# Patient Record
Sex: MALE | Race: WHITE | Employment: OTHER
[De-identification: names, ages, dates, MRNs, and addresses within clinical notes are randomized per-mention and may not be internally consistent; named-entity substitution may affect disease eponyms.]

---

## 2017-03-06 ENCOUNTER — OFFICE VISIT (OUTPATIENT)
Dept: CARDIOLOGY | Facility: CLINIC | Age: 82
End: 2017-03-06

## 2017-03-06 VITALS
HEART RATE: 79 BPM | DIASTOLIC BLOOD PRESSURE: 80 MMHG | SYSTOLIC BLOOD PRESSURE: 140 MMHG | OXYGEN SATURATION: 98 % | BODY MASS INDEX: 23.24 KG/M2 | WEIGHT: 162 LBS

## 2017-03-06 DIAGNOSIS — E78.49 OTHER HYPERLIPIDEMIA: Primary | ICD-10-CM

## 2017-03-06 DIAGNOSIS — I10 ESSENTIAL HYPERTENSION: ICD-10-CM

## 2017-03-06 PROCEDURE — G8427 DOCREV CUR MEDS BY ELIG CLIN: HCPCS | Performed by: INTERNAL MEDICINE

## 2017-03-06 PROCEDURE — 1036F TOBACCO NON-USER: CPT | Performed by: INTERNAL MEDICINE

## 2017-03-06 PROCEDURE — 1123F ACP DISCUSS/DSCN MKR DOCD: CPT | Performed by: INTERNAL MEDICINE

## 2017-03-06 PROCEDURE — G8598 ASA/ANTIPLAT THER USED: HCPCS | Performed by: INTERNAL MEDICINE

## 2017-03-06 PROCEDURE — G8484 FLU IMMUNIZE NO ADMIN: HCPCS | Performed by: INTERNAL MEDICINE

## 2017-03-06 PROCEDURE — G8420 CALC BMI NORM PARAMETERS: HCPCS | Performed by: INTERNAL MEDICINE

## 2017-03-06 PROCEDURE — 99214 OFFICE O/P EST MOD 30 MIN: CPT | Performed by: INTERNAL MEDICINE

## 2017-03-06 PROCEDURE — 4040F PNEUMOC VAC/ADMIN/RCVD: CPT | Performed by: INTERNAL MEDICINE

## 2017-03-08 ENCOUNTER — INITIAL CONSULT (OUTPATIENT)
Dept: SURGERY | Facility: CLINIC | Age: 82
End: 2017-03-08

## 2017-03-08 VITALS
HEIGHT: 70 IN | WEIGHT: 160 LBS | SYSTOLIC BLOOD PRESSURE: 140 MMHG | RESPIRATION RATE: 18 BRPM | HEART RATE: 88 BPM | BODY MASS INDEX: 22.9 KG/M2 | DIASTOLIC BLOOD PRESSURE: 90 MMHG

## 2017-03-08 DIAGNOSIS — Z86.010 HISTORY OF COLON POLYPS: ICD-10-CM

## 2017-03-08 DIAGNOSIS — K43.2 INCISIONAL HERNIA, WITHOUT OBSTRUCTION OR GANGRENE: Primary | ICD-10-CM

## 2017-03-08 DIAGNOSIS — R91.1 PULMONARY NODULE: ICD-10-CM

## 2017-03-08 PROCEDURE — G8598 ASA/ANTIPLAT THER USED: HCPCS | Performed by: SURGERY

## 2017-03-08 PROCEDURE — 99204 OFFICE O/P NEW MOD 45 MIN: CPT | Performed by: SURGERY

## 2017-03-08 PROCEDURE — G8419 CALC BMI OUT NRM PARAM NOF/U: HCPCS | Performed by: SURGERY

## 2017-03-08 PROCEDURE — 4040F PNEUMOC VAC/ADMIN/RCVD: CPT | Performed by: SURGERY

## 2017-03-08 PROCEDURE — G8427 DOCREV CUR MEDS BY ELIG CLIN: HCPCS | Performed by: SURGERY

## 2017-03-08 PROCEDURE — G8484 FLU IMMUNIZE NO ADMIN: HCPCS | Performed by: SURGERY

## 2017-03-08 PROCEDURE — 1123F ACP DISCUSS/DSCN MKR DOCD: CPT | Performed by: SURGERY

## 2017-03-08 PROCEDURE — 1036F TOBACCO NON-USER: CPT | Performed by: SURGERY

## 2017-03-08 ASSESSMENT — ENCOUNTER SYMPTOMS
COUGH: 0
NAUSEA: 0
BACK PAIN: 0
TROUBLE SWALLOWING: 0
CHOKING: 0
ABDOMINAL PAIN: 1
SHORTNESS OF BREATH: 0
SORE THROAT: 0
BLOOD IN STOOL: 0
VOMITING: 0

## 2017-03-09 PROBLEM — M54.2 CERVICAL PAIN: Status: ACTIVE | Noted: 2017-01-20

## 2017-03-09 PROBLEM — M19.049 ARTHRITIS OF HAND, DEGENERATIVE: Status: ACTIVE | Noted: 2017-01-20

## 2017-03-17 ENCOUNTER — HOSPITAL ENCOUNTER (OUTPATIENT)
Dept: CT IMAGING | Age: 82
Discharge: HOME | End: 2017-03-17
Payer: MEDICARE

## 2017-03-17 DIAGNOSIS — R91.1 PULMONARY NODULE: ICD-10-CM

## 2017-03-17 PROCEDURE — 71260 CT THORAX DX C+: CPT

## 2017-03-17 PROCEDURE — 6360000004 HC RX CONTRAST MEDICATION: Performed by: SURGERY

## 2017-03-17 RX ADMIN — IOVERSOL 75 ML: 741 INJECTION INTRA-ARTERIAL; INTRAVENOUS at 11:37

## 2017-03-23 ENCOUNTER — HOSPITAL ENCOUNTER (INPATIENT)
Age: 82
LOS: 1 days | Discharge: HOME | DRG: 641 | End: 2017-03-25
Attending: EMERGENCY MEDICINE | Admitting: INTERNAL MEDICINE
Payer: MEDICARE

## 2017-03-23 DIAGNOSIS — E87.6 HYPOKALEMIA: ICD-10-CM

## 2017-03-23 DIAGNOSIS — E86.0 DEHYDRATION: Primary | ICD-10-CM

## 2017-03-23 PROBLEM — K52.9 GASTROENTERITIS, ACUTE: Status: ACTIVE | Noted: 2017-03-23

## 2017-03-23 LAB
-: NORMAL
ABSOLUTE EOS #: 0 K/UL (ref 0–0.4)
ABSOLUTE LYMPH #: 0.8 K/UL (ref 0.9–2.5)
ABSOLUTE MONO #: 0.9 K/UL (ref 0–1)
ALBUMIN SERPL-MCNC: 3 G/DL (ref 3.5–5.2)
ALBUMIN/GLOBULIN RATIO: ABNORMAL (ref 1–2.5)
ALP BLD-CCNC: 52 U/L (ref 40–129)
ALT SERPL-CCNC: 18 U/L (ref 5–41)
AMORPHOUS: NORMAL
ANION GAP SERPL CALCULATED.3IONS-SCNC: 14 MMOL/L (ref 9–17)
AST SERPL-CCNC: 14 U/L
BACTERIA: NORMAL
BASOPHILS # BLD: 0 % (ref 0–2)
BASOPHILS ABSOLUTE: 0 K/UL (ref 0–0.2)
BILIRUB SERPL-MCNC: 0.43 MG/DL (ref 0.3–1.2)
BILIRUBIN URINE: NEGATIVE
BUN BLDV-MCNC: 45 MG/DL (ref 8–23)
BUN/CREAT BLD: 38 (ref 9–20)
CALCIUM SERPL-MCNC: 8.3 MG/DL (ref 8.6–10.4)
CASTS UA: NORMAL /LPF
CHLORIDE BLD-SCNC: 103 MMOL/L (ref 98–107)
CO2: 18 MMOL/L (ref 20–31)
COLOR: YELLOW
COMMENT UA: ABNORMAL
CREAT SERPL-MCNC: 1.17 MG/DL (ref 0.7–1.2)
CRYSTALS, UA: NORMAL /HPF
DIFFERENTIAL TYPE: YES
DIRECT EXAM: NORMAL
EOSINOPHILS RELATIVE PERCENT: 1 % (ref 0–5)
EPITHELIAL CELLS UA: NORMAL /HPF
GFR AFRICAN AMERICAN: >60 ML/MIN
GFR NON-AFRICAN AMERICAN: >60 ML/MIN
GFR SERPL CREATININE-BSD FRML MDRD: ABNORMAL ML/MIN/{1.73_M2}
GFR SERPL CREATININE-BSD FRML MDRD: ABNORMAL ML/MIN/{1.73_M2}
GLUCOSE BLD-MCNC: 108 MG/DL (ref 70–99)
GLUCOSE URINE: NEGATIVE
HCT VFR BLD CALC: 45.6 % (ref 41–53)
HEMOGLOBIN: 15.5 G/DL (ref 13.5–17.5)
KETONES, URINE: ABNORMAL
LEUKOCYTE ESTERASE, URINE: NEGATIVE
LYMPHOCYTES # BLD: 15 % (ref 13–44)
Lab: NORMAL
MAGNESIUM: 2.1 MG/DL (ref 1.6–2.6)
MCH RBC QN AUTO: 28.4 PG (ref 26–34)
MCHC RBC AUTO-ENTMCNC: 34 G/DL (ref 31–37)
MCV RBC AUTO: 83.6 FL (ref 80–100)
MONOCYTES # BLD: 16 % (ref 5–9)
MUCUS: NORMAL
NITRITE, URINE: NEGATIVE
OTHER OBSERVATIONS UA: NORMAL
PDW BLD-RTO: 14.3 % (ref 12.1–15.2)
PH UA: 5 (ref 5–8)
PLATELET # BLD: 284 K/UL (ref 140–450)
PLATELET ESTIMATE: ABNORMAL
PMV BLD AUTO: ABNORMAL FL (ref 6–12)
POTASSIUM SERPL-SCNC: 2.8 MMOL/L (ref 3.7–5.3)
PROTEIN UA: ABNORMAL
RBC # BLD: 5.45 M/UL (ref 4.5–5.9)
RBC # BLD: ABNORMAL 10*6/UL
RBC UA: NORMAL /HPF (ref 0–2)
RENAL EPITHELIAL, UA: NORMAL /HPF
SEG NEUTROPHILS: 68 % (ref 39–75)
SEGMENTED NEUTROPHILS ABSOLUTE COUNT: 3.9 K/UL (ref 2.1–6.5)
SODIUM BLD-SCNC: 135 MMOL/L (ref 135–144)
SPECIFIC GRAVITY UA: 1.02 (ref 1–1.03)
SPECIMEN DESCRIPTION: NORMAL
STATUS: NORMAL
TOTAL PROTEIN: 5.7 G/DL (ref 6.4–8.3)
TRICHOMONAS: NORMAL
TURBIDITY: CLEAR
URINE HGB: NEGATIVE
UROBILINOGEN, URINE: NORMAL
WBC # BLD: 5.8 K/UL (ref 3.5–11)
WBC # BLD: ABNORMAL 10*3/UL
WBC UA: NORMAL /HPF
YEAST: NORMAL

## 2017-03-23 PROCEDURE — 2580000003 HC RX 258: Performed by: EMERGENCY MEDICINE

## 2017-03-23 PROCEDURE — 87804 INFLUENZA ASSAY W/OPTIC: CPT

## 2017-03-23 PROCEDURE — 87328 CRYPTOSPORIDIUM AG IA: CPT

## 2017-03-23 PROCEDURE — 87505 NFCT AGENT DETECTION GI: CPT

## 2017-03-23 PROCEDURE — 2580000003 HC RX 258: Performed by: INTERNAL MEDICINE

## 2017-03-23 PROCEDURE — 96374 THER/PROPH/DIAG INJ IV PUSH: CPT

## 2017-03-23 PROCEDURE — 81001 URINALYSIS AUTO W/SCOPE: CPT

## 2017-03-23 PROCEDURE — 99285 EMERGENCY DEPT VISIT HI MDM: CPT

## 2017-03-23 PROCEDURE — G0378 HOSPITAL OBSERVATION PER HR: HCPCS

## 2017-03-23 PROCEDURE — 87324 CLOSTRIDIUM AG IA: CPT

## 2017-03-23 PROCEDURE — 6370000000 HC RX 637 (ALT 250 FOR IP): Performed by: EMERGENCY MEDICINE

## 2017-03-23 PROCEDURE — 80053 COMPREHEN METABOLIC PANEL: CPT

## 2017-03-23 PROCEDURE — 96361 HYDRATE IV INFUSION ADD-ON: CPT

## 2017-03-23 PROCEDURE — 6360000002 HC RX W HCPCS: Performed by: EMERGENCY MEDICINE

## 2017-03-23 PROCEDURE — 6370000000 HC RX 637 (ALT 250 FOR IP): Performed by: INTERNAL MEDICINE

## 2017-03-23 PROCEDURE — 83735 ASSAY OF MAGNESIUM: CPT

## 2017-03-23 PROCEDURE — 36415 COLL VENOUS BLD VENIPUNCTURE: CPT

## 2017-03-23 PROCEDURE — 93005 ELECTROCARDIOGRAM TRACING: CPT

## 2017-03-23 PROCEDURE — 96375 TX/PRO/DX INJ NEW DRUG ADDON: CPT

## 2017-03-23 PROCEDURE — 87493 C DIFF AMPLIFIED PROBE: CPT

## 2017-03-23 PROCEDURE — 85025 COMPLETE CBC W/AUTO DIFF WBC: CPT

## 2017-03-23 PROCEDURE — 87329 GIARDIA AG IA: CPT

## 2017-03-23 RX ORDER — SODIUM CHLORIDE 0.9 % (FLUSH) 0.9 %
10 SYRINGE (ML) INJECTION EVERY 12 HOURS SCHEDULED
Status: DISCONTINUED | OUTPATIENT
Start: 2017-03-23 | End: 2017-03-25 | Stop reason: HOSPADM

## 2017-03-23 RX ORDER — BENZONATATE 100 MG/1
200 CAPSULE ORAL 3 TIMES DAILY PRN
Status: DISCONTINUED | OUTPATIENT
Start: 2017-03-23 | End: 2017-03-25 | Stop reason: HOSPADM

## 2017-03-23 RX ORDER — MORPHINE SULFATE 2 MG/ML
2 INJECTION, SOLUTION INTRAMUSCULAR; INTRAVENOUS EVERY 4 HOURS PRN
Status: DISCONTINUED | OUTPATIENT
Start: 2017-03-23 | End: 2017-03-25 | Stop reason: HOSPADM

## 2017-03-23 RX ORDER — MORPHINE SULFATE 4 MG/ML
4 INJECTION, SOLUTION INTRAMUSCULAR; INTRAVENOUS ONCE
Status: COMPLETED | OUTPATIENT
Start: 2017-03-23 | End: 2017-03-23

## 2017-03-23 RX ORDER — ONDANSETRON 2 MG/ML
4 INJECTION INTRAMUSCULAR; INTRAVENOUS EVERY 6 HOURS PRN
Status: DISCONTINUED | OUTPATIENT
Start: 2017-03-23 | End: 2017-03-25 | Stop reason: HOSPADM

## 2017-03-23 RX ORDER — ATORVASTATIN CALCIUM 20 MG/1
80 TABLET, FILM COATED ORAL DAILY
Status: DISCONTINUED | OUTPATIENT
Start: 2017-03-24 | End: 2017-03-25 | Stop reason: HOSPADM

## 2017-03-23 RX ORDER — DIPHENHYDRAMINE HYDROCHLORIDE 50 MG/ML
12.5 INJECTION INTRAMUSCULAR; INTRAVENOUS ONCE
Status: COMPLETED | OUTPATIENT
Start: 2017-03-23 | End: 2017-03-23

## 2017-03-23 RX ORDER — CLONAZEPAM 0.5 MG/1
0.25 TABLET ORAL DAILY
Status: DISCONTINUED | OUTPATIENT
Start: 2017-03-24 | End: 2017-03-25 | Stop reason: HOSPADM

## 2017-03-23 RX ORDER — ISOSORBIDE MONONITRATE 30 MG/1
15 TABLET, EXTENDED RELEASE ORAL DAILY
Status: DISCONTINUED | OUTPATIENT
Start: 2017-03-24 | End: 2017-03-25 | Stop reason: HOSPADM

## 2017-03-23 RX ORDER — 0.9 % SODIUM CHLORIDE 0.9 %
1000 INTRAVENOUS SOLUTION INTRAVENOUS ONCE
Status: COMPLETED | OUTPATIENT
Start: 2017-03-23 | End: 2017-03-23

## 2017-03-23 RX ORDER — POTASSIUM CHLORIDE 750 MG/1
40 TABLET, FILM COATED, EXTENDED RELEASE ORAL ONCE
Status: COMPLETED | OUTPATIENT
Start: 2017-03-23 | End: 2017-03-23

## 2017-03-23 RX ORDER — LORAZEPAM 2 MG/ML
1 INJECTION INTRAMUSCULAR ONCE
Status: COMPLETED | OUTPATIENT
Start: 2017-03-23 | End: 2017-03-23

## 2017-03-23 RX ORDER — NITROGLYCERIN 0.4 MG/1
0.4 TABLET SUBLINGUAL EVERY 5 MIN PRN
Status: DISCONTINUED | OUTPATIENT
Start: 2017-03-23 | End: 2017-03-25 | Stop reason: HOSPADM

## 2017-03-23 RX ORDER — ASPIRIN 325 MG
325 TABLET ORAL DAILY
Status: DISCONTINUED | OUTPATIENT
Start: 2017-03-24 | End: 2017-03-25 | Stop reason: HOSPADM

## 2017-03-23 RX ORDER — SODIUM CHLORIDE 9 MG/ML
INJECTION, SOLUTION INTRAVENOUS CONTINUOUS
Status: DISCONTINUED | OUTPATIENT
Start: 2017-03-23 | End: 2017-03-25 | Stop reason: HOSPADM

## 2017-03-23 RX ORDER — CLOPIDOGREL BISULFATE 75 MG/1
75 TABLET ORAL DAILY
Status: DISCONTINUED | OUTPATIENT
Start: 2017-03-24 | End: 2017-03-25 | Stop reason: HOSPADM

## 2017-03-23 RX ORDER — ACETAMINOPHEN 325 MG/1
650 TABLET ORAL EVERY 4 HOURS PRN
Status: DISCONTINUED | OUTPATIENT
Start: 2017-03-23 | End: 2017-03-25 | Stop reason: HOSPADM

## 2017-03-23 RX ORDER — SODIUM CHLORIDE 0.9 % (FLUSH) 0.9 %
10 SYRINGE (ML) INJECTION PRN
Status: DISCONTINUED | OUTPATIENT
Start: 2017-03-23 | End: 2017-03-25 | Stop reason: HOSPADM

## 2017-03-23 RX ORDER — POTASSIUM BICARBONATE 25 MEQ/1
50 TABLET, EFFERVESCENT ORAL ONCE
Status: COMPLETED | OUTPATIENT
Start: 2017-03-23 | End: 2017-03-23

## 2017-03-23 RX ORDER — PANTOPRAZOLE SODIUM 40 MG/10ML
40 INJECTION, POWDER, LYOPHILIZED, FOR SOLUTION INTRAVENOUS DAILY
Status: DISCONTINUED | OUTPATIENT
Start: 2017-03-24 | End: 2017-03-24

## 2017-03-23 RX ORDER — PROPRANOLOL HYDROCHLORIDE 10 MG/1
60 TABLET ORAL 3 TIMES DAILY
Status: DISCONTINUED | OUTPATIENT
Start: 2017-03-23 | End: 2017-03-25 | Stop reason: HOSPADM

## 2017-03-23 RX ORDER — VENLAFAXINE 25 MG/1
100 TABLET ORAL 2 TIMES DAILY
Status: DISCONTINUED | OUTPATIENT
Start: 2017-03-23 | End: 2017-03-25 | Stop reason: HOSPADM

## 2017-03-23 RX ADMIN — LORAZEPAM 1 MG: 2 INJECTION, SOLUTION INTRAMUSCULAR; INTRAVENOUS at 17:17

## 2017-03-23 RX ADMIN — SODIUM CHLORIDE: 9 INJECTION, SOLUTION INTRAVENOUS at 23:02

## 2017-03-23 RX ADMIN — SODIUM CHLORIDE 1000 ML: 9 INJECTION, SOLUTION INTRAVENOUS at 17:11

## 2017-03-23 RX ADMIN — DIPHENHYDRAMINE HYDROCHLORIDE 12.5 MG: 50 INJECTION, SOLUTION INTRAMUSCULAR; INTRAVENOUS at 17:12

## 2017-03-23 RX ADMIN — PROPRANOLOL HYDROCHLORIDE 60 MG: 10 TABLET ORAL at 23:02

## 2017-03-23 RX ADMIN — POTASSIUM CHLORIDE 40 MEQ: 750 TABLET, FILM COATED, EXTENDED RELEASE ORAL at 23:02

## 2017-03-23 RX ADMIN — POTASSIUM BICARBONATE 50 MEQ: 977.5 TABLET, EFFERVESCENT ORAL at 19:04

## 2017-03-23 RX ADMIN — MORPHINE SULFATE 4 MG: 4 INJECTION, SOLUTION INTRAMUSCULAR; INTRAVENOUS at 17:12

## 2017-03-23 ASSESSMENT — PAIN SCALES - GENERAL
PAINLEVEL_OUTOF10: 8
PAINLEVEL_OUTOF10: 3
PAINLEVEL_OUTOF10: 10

## 2017-03-23 ASSESSMENT — PAIN DESCRIPTION - ORIENTATION
ORIENTATION: RIGHT;LEFT
ORIENTATION: MID

## 2017-03-23 ASSESSMENT — PAIN DESCRIPTION - PAIN TYPE
TYPE: ACUTE PAIN
TYPE: ACUTE PAIN

## 2017-03-23 ASSESSMENT — PAIN DESCRIPTION - PROGRESSION: CLINICAL_PROGRESSION: NOT CHANGED

## 2017-03-23 ASSESSMENT — PAIN DESCRIPTION - FREQUENCY: FREQUENCY: INTERMITTENT

## 2017-03-23 ASSESSMENT — PAIN DESCRIPTION - DESCRIPTORS: DESCRIPTORS: CRAMPING

## 2017-03-23 ASSESSMENT — PAIN DESCRIPTION - LOCATION
LOCATION: ABDOMEN
LOCATION: ABDOMEN

## 2017-03-23 ASSESSMENT — PAIN DESCRIPTION - ONSET: ONSET: ON-GOING

## 2017-03-24 LAB
ANION GAP SERPL CALCULATED.3IONS-SCNC: 7 MMOL/L (ref 9–17)
BUN BLDV-MCNC: 33 MG/DL (ref 8–23)
BUN/CREAT BLD: 30 (ref 9–20)
CALCIUM SERPL-MCNC: 8.3 MG/DL (ref 8.6–10.4)
CHLORIDE BLD-SCNC: 107 MMOL/L (ref 98–107)
CO2: 25 MMOL/L (ref 20–31)
CREAT SERPL-MCNC: 1.11 MG/DL (ref 0.7–1.2)
DIRECT EXAM: NEGATIVE
DIRECT EXAM: NORMAL
DIRECT EXAM: NORMAL
EKG ATRIAL RATE: 94 BPM
EKG P AXIS: 53 DEGREES
EKG P-R INTERVAL: 182 MS
EKG Q-T INTERVAL: 394 MS
EKG QRS DURATION: 144 MS
EKG QTC CALCULATION (BAZETT): 492 MS
EKG R AXIS: -45 DEGREES
EKG T AXIS: 17 DEGREES
EKG VENTRICULAR RATE: 94 BPM
GFR AFRICAN AMERICAN: >60 ML/MIN
GFR NON-AFRICAN AMERICAN: >60 ML/MIN
GFR SERPL CREATININE-BSD FRML MDRD: ABNORMAL ML/MIN/{1.73_M2}
GFR SERPL CREATININE-BSD FRML MDRD: ABNORMAL ML/MIN/{1.73_M2}
GLUCOSE BLD-MCNC: 100 MG/DL (ref 70–99)
Lab: NORMAL
MAGNESIUM: 2.3 MG/DL (ref 1.6–2.6)
POTASSIUM SERPL-SCNC: 4.6 MMOL/L (ref 3.7–5.3)
SODIUM BLD-SCNC: 139 MMOL/L (ref 135–144)
SPECIMEN DESCRIPTION: NORMAL
STATUS: NORMAL

## 2017-03-24 PROCEDURE — 36415 COLL VENOUS BLD VENIPUNCTURE: CPT

## 2017-03-24 PROCEDURE — 83735 ASSAY OF MAGNESIUM: CPT

## 2017-03-24 PROCEDURE — 6360000002 HC RX W HCPCS: Performed by: INTERNAL MEDICINE

## 2017-03-24 PROCEDURE — G0378 HOSPITAL OBSERVATION PER HR: HCPCS

## 2017-03-24 PROCEDURE — 97161 PT EVAL LOW COMPLEX 20 MIN: CPT

## 2017-03-24 PROCEDURE — 6370000000 HC RX 637 (ALT 250 FOR IP): Performed by: INTERNAL MEDICINE

## 2017-03-24 PROCEDURE — C9113 INJ PANTOPRAZOLE SODIUM, VIA: HCPCS | Performed by: INTERNAL MEDICINE

## 2017-03-24 PROCEDURE — 2580000003 HC RX 258: Performed by: INTERNAL MEDICINE

## 2017-03-24 PROCEDURE — 80048 BASIC METABOLIC PNL TOTAL CA: CPT

## 2017-03-24 PROCEDURE — 1200000000 HC SEMI PRIVATE

## 2017-03-24 RX ORDER — LOPERAMIDE HYDROCHLORIDE 2 MG/1
2 CAPSULE ORAL 4 TIMES DAILY PRN
Status: DISCONTINUED | OUTPATIENT
Start: 2017-03-24 | End: 2017-03-25 | Stop reason: HOSPADM

## 2017-03-24 RX ADMIN — ASPIRIN 325 MG ORAL TABLET 325 MG: 325 PILL ORAL at 08:41

## 2017-03-24 RX ADMIN — ISOSORBIDE MONONITRATE 15 MG: 30 TABLET, EXTENDED RELEASE ORAL at 08:43

## 2017-03-24 RX ADMIN — ENOXAPARIN SODIUM 40 MG: 40 INJECTION SUBCUTANEOUS at 08:42

## 2017-03-24 RX ADMIN — CLOPIDOGREL BISULFATE 75 MG: 75 TABLET ORAL at 08:42

## 2017-03-24 RX ADMIN — GABAPENTIN 400 MG: 300 CAPSULE ORAL at 08:42

## 2017-03-24 RX ADMIN — SODIUM CHLORIDE: 9 INJECTION, SOLUTION INTRAVENOUS at 18:01

## 2017-03-24 RX ADMIN — LOPERAMIDE HYDROCHLORIDE 2 MG: 2 CAPSULE ORAL at 17:03

## 2017-03-24 RX ADMIN — PROPRANOLOL HYDROCHLORIDE 60 MG: 10 TABLET ORAL at 08:41

## 2017-03-24 RX ADMIN — SODIUM CHLORIDE: 9 INJECTION, SOLUTION INTRAVENOUS at 08:01

## 2017-03-24 RX ADMIN — CLONAZEPAM 0.25 MG: 0.5 TABLET ORAL at 08:41

## 2017-03-24 RX ADMIN — VENLAFAXINE 100 MG: 25 TABLET ORAL at 08:40

## 2017-03-24 RX ADMIN — Medication 10 ML: at 08:42

## 2017-03-24 RX ADMIN — PANTOPRAZOLE SODIUM 40 MG: 40 INJECTION, POWDER, FOR SOLUTION INTRAVENOUS at 08:42

## 2017-03-24 RX ADMIN — VENLAFAXINE 100 MG: 25 TABLET ORAL at 20:33

## 2017-03-24 ASSESSMENT — PAIN SCALES - GENERAL
PAINLEVEL_OUTOF10: 4
PAINLEVEL_OUTOF10: 0
PAINLEVEL_OUTOF10: 2
PAINLEVEL_OUTOF10: 3
PAINLEVEL_OUTOF10: 0
PAINLEVEL_OUTOF10: 3

## 2017-03-24 ASSESSMENT — PAIN DESCRIPTION - ONSET
ONSET: ON-GOING

## 2017-03-24 ASSESSMENT — PAIN DESCRIPTION - LOCATION
LOCATION: ABDOMEN

## 2017-03-24 ASSESSMENT — PAIN DESCRIPTION - ORIENTATION
ORIENTATION: RIGHT;LEFT

## 2017-03-24 ASSESSMENT — PAIN DESCRIPTION - PROGRESSION
CLINICAL_PROGRESSION: NOT CHANGED
CLINICAL_PROGRESSION: GRADUALLY IMPROVING
CLINICAL_PROGRESSION: NOT CHANGED

## 2017-03-24 ASSESSMENT — PAIN DESCRIPTION - DESCRIPTORS
DESCRIPTORS: CRAMPING

## 2017-03-24 ASSESSMENT — PAIN DESCRIPTION - PAIN TYPE
TYPE: ACUTE PAIN

## 2017-03-24 ASSESSMENT — PAIN DESCRIPTION - FREQUENCY
FREQUENCY: INTERMITTENT

## 2017-03-25 VITALS
HEIGHT: 70 IN | BODY MASS INDEX: 20.19 KG/M2 | DIASTOLIC BLOOD PRESSURE: 65 MMHG | RESPIRATION RATE: 18 BRPM | HEART RATE: 71 BPM | SYSTOLIC BLOOD PRESSURE: 116 MMHG | WEIGHT: 141 LBS | OXYGEN SATURATION: 97 % | TEMPERATURE: 98.2 F

## 2017-03-25 LAB
ANION GAP SERPL CALCULATED.3IONS-SCNC: 10 MMOL/L (ref 9–17)
BUN BLDV-MCNC: 22 MG/DL (ref 8–23)
BUN/CREAT BLD: 22 (ref 9–20)
CALCIUM SERPL-MCNC: 8 MG/DL (ref 8.6–10.4)
CAMPYLOBACTER PCR: NORMAL
CHLORIDE BLD-SCNC: 106 MMOL/L (ref 98–107)
CO2: 24 MMOL/L (ref 20–31)
CREAT SERPL-MCNC: 0.99 MG/DL (ref 0.7–1.2)
DIRECT EXAM: NORMAL
GFR AFRICAN AMERICAN: >60 ML/MIN
GFR NON-AFRICAN AMERICAN: >60 ML/MIN
GFR SERPL CREATININE-BSD FRML MDRD: ABNORMAL ML/MIN/{1.73_M2}
GFR SERPL CREATININE-BSD FRML MDRD: ABNORMAL ML/MIN/{1.73_M2}
GLUCOSE BLD-MCNC: 91 MG/DL (ref 70–99)
Lab: NORMAL
POTASSIUM SERPL-SCNC: 4.1 MMOL/L (ref 3.7–5.3)
SALMONELLA PCR: NORMAL
SHIGATOXIN GENE PCR: NORMAL
SHIGELLA SP PCR: NORMAL
SODIUM BLD-SCNC: 140 MMOL/L (ref 135–144)
SPECIMEN DESCRIPTION: NORMAL
SPECIMEN DESCRIPTION: NORMAL
SPECIMEN: NORMAL
STATUS: NORMAL

## 2017-03-25 PROCEDURE — 6360000002 HC RX W HCPCS: Performed by: INTERNAL MEDICINE

## 2017-03-25 PROCEDURE — 80048 BASIC METABOLIC PNL TOTAL CA: CPT

## 2017-03-25 PROCEDURE — 2580000003 HC RX 258: Performed by: INTERNAL MEDICINE

## 2017-03-25 PROCEDURE — 6370000000 HC RX 637 (ALT 250 FOR IP): Performed by: INTERNAL MEDICINE

## 2017-03-25 PROCEDURE — 36415 COLL VENOUS BLD VENIPUNCTURE: CPT

## 2017-03-25 RX ADMIN — ISOSORBIDE MONONITRATE 15 MG: 30 TABLET, EXTENDED RELEASE ORAL at 09:17

## 2017-03-25 RX ADMIN — ENOXAPARIN SODIUM 40 MG: 40 INJECTION SUBCUTANEOUS at 09:16

## 2017-03-25 RX ADMIN — GABAPENTIN 400 MG: 300 CAPSULE ORAL at 09:17

## 2017-03-25 RX ADMIN — ATORVASTATIN CALCIUM 80 MG: 20 TABLET, FILM COATED ORAL at 09:16

## 2017-03-25 RX ADMIN — CLONAZEPAM 0.25 MG: 0.5 TABLET ORAL at 09:18

## 2017-03-25 RX ADMIN — ACETAMINOPHEN 650 MG: 325 TABLET, FILM COATED ORAL at 03:42

## 2017-03-25 RX ADMIN — PROPRANOLOL HYDROCHLORIDE 60 MG: 10 TABLET ORAL at 09:17

## 2017-03-25 RX ADMIN — CLOPIDOGREL BISULFATE 75 MG: 75 TABLET ORAL at 09:17

## 2017-03-25 RX ADMIN — SODIUM CHLORIDE: 9 INJECTION, SOLUTION INTRAVENOUS at 03:39

## 2017-03-25 RX ADMIN — ASPIRIN 325 MG ORAL TABLET 325 MG: 325 PILL ORAL at 09:17

## 2017-03-25 RX ADMIN — VENLAFAXINE 100 MG: 25 TABLET ORAL at 09:17

## 2017-03-25 ASSESSMENT — PAIN SCALES - GENERAL
PAINLEVEL_OUTOF10: 3
PAINLEVEL_OUTOF10: 0
PAINLEVEL_OUTOF10: 3

## 2017-03-31 ENCOUNTER — OFFICE VISIT (OUTPATIENT)
Dept: FAMILY MEDICINE CLINIC | Age: 82
End: 2017-03-31
Payer: MEDICARE

## 2017-03-31 VITALS
SYSTOLIC BLOOD PRESSURE: 115 MMHG | WEIGHT: 155 LBS | BODY MASS INDEX: 22.24 KG/M2 | HEART RATE: 74 BPM | DIASTOLIC BLOOD PRESSURE: 68 MMHG | OXYGEN SATURATION: 98 %

## 2017-03-31 DIAGNOSIS — I20.9 ANGINAL PAIN (HCC): ICD-10-CM

## 2017-03-31 DIAGNOSIS — I10 ESSENTIAL HYPERTENSION: Primary | ICD-10-CM

## 2017-03-31 DIAGNOSIS — I25.10 CORONARY ARTERY DISEASE INVOLVING NATIVE HEART, ANGINA PRESENCE UNSPECIFIED, UNSPECIFIED VESSEL OR LESION TYPE: ICD-10-CM

## 2017-03-31 PROCEDURE — 1036F TOBACCO NON-USER: CPT | Performed by: FAMILY MEDICINE

## 2017-03-31 PROCEDURE — G8598 ASA/ANTIPLAT THER USED: HCPCS | Performed by: FAMILY MEDICINE

## 2017-03-31 PROCEDURE — 1111F DSCHRG MED/CURRENT MED MERGE: CPT | Performed by: FAMILY MEDICINE

## 2017-03-31 PROCEDURE — G8419 CALC BMI OUT NRM PARAM NOF/U: HCPCS | Performed by: FAMILY MEDICINE

## 2017-03-31 PROCEDURE — G8484 FLU IMMUNIZE NO ADMIN: HCPCS | Performed by: FAMILY MEDICINE

## 2017-03-31 PROCEDURE — 99202 OFFICE O/P NEW SF 15 MIN: CPT | Performed by: FAMILY MEDICINE

## 2017-03-31 PROCEDURE — 1123F ACP DISCUSS/DSCN MKR DOCD: CPT | Performed by: FAMILY MEDICINE

## 2017-03-31 PROCEDURE — G8427 DOCREV CUR MEDS BY ELIG CLIN: HCPCS | Performed by: FAMILY MEDICINE

## 2017-03-31 PROCEDURE — 4040F PNEUMOC VAC/ADMIN/RCVD: CPT | Performed by: FAMILY MEDICINE

## 2017-04-07 ENCOUNTER — NURSE ONLY (OUTPATIENT)
Dept: FAMILY MEDICINE CLINIC | Age: 82
End: 2017-04-07

## 2017-04-07 VITALS — SYSTOLIC BLOOD PRESSURE: 126 MMHG | HEART RATE: 110 BPM | OXYGEN SATURATION: 97 % | DIASTOLIC BLOOD PRESSURE: 78 MMHG

## 2017-04-07 DIAGNOSIS — I10 ESSENTIAL HYPERTENSION: Primary | ICD-10-CM

## 2017-04-16 ASSESSMENT — ENCOUNTER SYMPTOMS
WHEEZING: 0
TROUBLE SWALLOWING: 0
VOMITING: 0
COUGH: 0
CONSTIPATION: 0
PHOTOPHOBIA: 0
BACK PAIN: 0
DIARRHEA: 0
SHORTNESS OF BREATH: 0
ABDOMINAL DISTENTION: 0
BLURRED VISION: 0
ORTHOPNEA: 0
COLOR CHANGE: 0
NAUSEA: 0
ABDOMINAL PAIN: 0
FACIAL SWELLING: 0

## 2017-05-02 ENCOUNTER — HOSPITAL ENCOUNTER (OUTPATIENT)
Age: 82
Discharge: HOME | End: 2017-05-02
Payer: MEDICARE

## 2017-05-02 DIAGNOSIS — E78.49 OTHER HYPERLIPIDEMIA: ICD-10-CM

## 2017-05-02 DIAGNOSIS — I10 ESSENTIAL HYPERTENSION: ICD-10-CM

## 2017-05-02 LAB
CHOLESTEROL/HDL RATIO: 6.1
CHOLESTEROL: 280 MG/DL
HDLC SERPL-MCNC: 46 MG/DL
LDL CHOLESTEROL: 207 MG/DL (ref 0–130)
PATIENT FASTING?: YES
TRIGL SERPL-MCNC: 134 MG/DL
VLDLC SERPL CALC-MCNC: ABNORMAL MG/DL (ref 1–30)

## 2017-05-02 PROCEDURE — 80061 LIPID PANEL: CPT

## 2017-05-02 PROCEDURE — 36415 COLL VENOUS BLD VENIPUNCTURE: CPT

## 2017-05-08 ENCOUNTER — OFFICE VISIT (OUTPATIENT)
Dept: CARDIOLOGY CLINIC | Age: 82
End: 2017-05-08
Payer: MEDICARE

## 2017-05-08 VITALS
OXYGEN SATURATION: 97 % | SYSTOLIC BLOOD PRESSURE: 140 MMHG | WEIGHT: 157 LBS | HEART RATE: 100 BPM | DIASTOLIC BLOOD PRESSURE: 88 MMHG | BODY MASS INDEX: 22.53 KG/M2

## 2017-05-08 DIAGNOSIS — E78.49 OTHER HYPERLIPIDEMIA: Primary | ICD-10-CM

## 2017-05-08 DIAGNOSIS — E55.9 VITAMIN D DEFICIENCY DISEASE: ICD-10-CM

## 2017-05-08 DIAGNOSIS — I25.10 CORONARY ARTERY DISEASE INVOLVING NATIVE HEART, ANGINA PRESENCE UNSPECIFIED, UNSPECIFIED VESSEL OR LESION TYPE: ICD-10-CM

## 2017-05-08 DIAGNOSIS — Z98.61 HISTORY OF CORONARY ANGIOPLASTY: ICD-10-CM

## 2017-05-08 DIAGNOSIS — I10 ESSENTIAL HYPERTENSION: ICD-10-CM

## 2017-05-08 PROCEDURE — G8419 CALC BMI OUT NRM PARAM NOF/U: HCPCS | Performed by: INTERNAL MEDICINE

## 2017-05-08 PROCEDURE — 99212 OFFICE O/P EST SF 10 MIN: CPT | Performed by: INTERNAL MEDICINE

## 2017-05-08 PROCEDURE — G8427 DOCREV CUR MEDS BY ELIG CLIN: HCPCS | Performed by: INTERNAL MEDICINE

## 2017-05-08 PROCEDURE — 4040F PNEUMOC VAC/ADMIN/RCVD: CPT | Performed by: INTERNAL MEDICINE

## 2017-05-08 PROCEDURE — 1036F TOBACCO NON-USER: CPT | Performed by: INTERNAL MEDICINE

## 2017-05-08 PROCEDURE — 1123F ACP DISCUSS/DSCN MKR DOCD: CPT | Performed by: INTERNAL MEDICINE

## 2017-05-08 RX ORDER — ATORVASTATIN CALCIUM 80 MG/1
80 TABLET, FILM COATED ORAL DAILY
Qty: 30 TABLET | Refills: 3 | Status: SHIPPED | OUTPATIENT
Start: 2017-05-08 | End: 2017-05-08 | Stop reason: SDUPTHER

## 2017-05-08 RX ORDER — ATORVASTATIN CALCIUM 80 MG/1
80 TABLET, FILM COATED ORAL DAILY
Qty: 90 TABLET | Refills: 3 | Status: SHIPPED | OUTPATIENT
Start: 2017-05-08 | End: 2018-11-01 | Stop reason: ALTCHOICE

## 2017-06-06 ENCOUNTER — OFFICE VISIT (OUTPATIENT)
Dept: SURGERY | Age: 82
End: 2017-06-06
Payer: MEDICARE

## 2017-06-06 VITALS
HEART RATE: 88 BPM | SYSTOLIC BLOOD PRESSURE: 130 MMHG | BODY MASS INDEX: 22.62 KG/M2 | WEIGHT: 158 LBS | RESPIRATION RATE: 18 BRPM | DIASTOLIC BLOOD PRESSURE: 84 MMHG | HEIGHT: 70 IN

## 2017-06-06 DIAGNOSIS — Z86.010 HISTORY OF COLON POLYPS: ICD-10-CM

## 2017-06-06 DIAGNOSIS — R91.1 PULMONARY NODULE: ICD-10-CM

## 2017-06-06 DIAGNOSIS — K43.2 INCISIONAL HERNIA, WITHOUT OBSTRUCTION OR GANGRENE: Primary | ICD-10-CM

## 2017-06-06 PROCEDURE — G8419 CALC BMI OUT NRM PARAM NOF/U: HCPCS | Performed by: SURGERY

## 2017-06-06 PROCEDURE — 4040F PNEUMOC VAC/ADMIN/RCVD: CPT | Performed by: SURGERY

## 2017-06-06 PROCEDURE — G8427 DOCREV CUR MEDS BY ELIG CLIN: HCPCS | Performed by: SURGERY

## 2017-06-06 PROCEDURE — 1123F ACP DISCUSS/DSCN MKR DOCD: CPT | Performed by: SURGERY

## 2017-06-06 PROCEDURE — 99213 OFFICE O/P EST LOW 20 MIN: CPT | Performed by: SURGERY

## 2017-06-06 PROCEDURE — G8598 ASA/ANTIPLAT THER USED: HCPCS | Performed by: SURGERY

## 2017-06-06 PROCEDURE — 1036F TOBACCO NON-USER: CPT | Performed by: SURGERY

## 2017-06-13 ENCOUNTER — OFFICE VISIT (OUTPATIENT)
Dept: SURGERY | Age: 82
End: 2017-06-13

## 2017-06-13 ENCOUNTER — ANESTHESIA EVENT (OUTPATIENT)
Dept: OPERATING ROOM | Age: 82
End: 2017-06-13
Payer: MEDICARE

## 2017-06-13 VITALS — BODY MASS INDEX: 22.62 KG/M2 | HEIGHT: 70 IN | WEIGHT: 158 LBS

## 2017-06-13 DIAGNOSIS — K43.2 INCISIONAL HERNIA, WITHOUT OBSTRUCTION OR GANGRENE: Primary | ICD-10-CM

## 2017-06-13 DIAGNOSIS — Z86.010 HISTORY OF COLON POLYPS: ICD-10-CM

## 2017-06-13 PROCEDURE — 1036F TOBACCO NON-USER: CPT | Performed by: SURGERY

## 2017-06-13 PROCEDURE — 99999 PR OFFICE/OUTPT VISIT,PROCEDURE ONLY: CPT | Performed by: SURGERY

## 2017-06-14 ENCOUNTER — HOSPITAL ENCOUNTER (OUTPATIENT)
Age: 82
Setting detail: OUTPATIENT SURGERY
Discharge: HOME | End: 2017-06-14
Attending: SURGERY | Admitting: SURGERY
Payer: MEDICARE

## 2017-06-14 ENCOUNTER — ANESTHESIA (OUTPATIENT)
Dept: OPERATING ROOM | Age: 82
End: 2017-06-14
Payer: MEDICARE

## 2017-06-14 VITALS
TEMPERATURE: 98.2 F | SYSTOLIC BLOOD PRESSURE: 150 MMHG | RESPIRATION RATE: 15 BRPM | DIASTOLIC BLOOD PRESSURE: 91 MMHG | OXYGEN SATURATION: 99 %

## 2017-06-14 VITALS
SYSTOLIC BLOOD PRESSURE: 143 MMHG | BODY MASS INDEX: 23.64 KG/M2 | TEMPERATURE: 98 F | RESPIRATION RATE: 16 BRPM | OXYGEN SATURATION: 98 % | HEIGHT: 68 IN | WEIGHT: 156 LBS | HEART RATE: 87 BPM | DIASTOLIC BLOOD PRESSURE: 92 MMHG

## 2017-06-14 PROBLEM — Z87.11 HISTORY OF STOMACH ULCERS: Status: ACTIVE | Noted: 2017-06-14

## 2017-06-14 PROBLEM — Z90.3 HISTORY OF PARTIAL GASTRECTOMY: Status: ACTIVE | Noted: 2017-06-14

## 2017-06-14 PROBLEM — R10.13 EPIGASTRIC PAIN: Status: ACTIVE | Noted: 2017-06-14

## 2017-06-14 PROCEDURE — 7100000010 HC PHASE II RECOVERY - FIRST 15 MIN: Performed by: SURGERY

## 2017-06-14 PROCEDURE — 2500000003 HC RX 250 WO HCPCS: Performed by: NURSE ANESTHETIST, CERTIFIED REGISTERED

## 2017-06-14 PROCEDURE — 3700000001 HC ADD 15 MINUTES (ANESTHESIA): Performed by: SURGERY

## 2017-06-14 PROCEDURE — 7100000011 HC PHASE II RECOVERY - ADDTL 15 MIN: Performed by: SURGERY

## 2017-06-14 PROCEDURE — 3700000000 HC ANESTHESIA ATTENDED CARE: Performed by: SURGERY

## 2017-06-14 PROCEDURE — 6370000000 HC RX 637 (ALT 250 FOR IP): Performed by: SURGERY

## 2017-06-14 PROCEDURE — 6360000002 HC RX W HCPCS: Performed by: NURSE ANESTHETIST, CERTIFIED REGISTERED

## 2017-06-14 PROCEDURE — 87077 CULTURE AEROBIC IDENTIFY: CPT

## 2017-06-14 PROCEDURE — 88305 TISSUE EXAM BY PATHOLOGIST: CPT

## 2017-06-14 PROCEDURE — 2580000003 HC RX 258: Performed by: SURGERY

## 2017-06-14 PROCEDURE — 3609017100 HC EGD: Performed by: SURGERY

## 2017-06-14 RX ORDER — MIDAZOLAM HYDROCHLORIDE 1 MG/ML
INJECTION INTRAMUSCULAR; INTRAVENOUS PRN
Status: DISCONTINUED | OUTPATIENT
Start: 2017-06-14 | End: 2017-06-14 | Stop reason: SDUPTHER

## 2017-06-14 RX ORDER — ACETAMINOPHEN 325 MG/1
650 TABLET ORAL EVERY 4 HOURS PRN
Status: CANCELLED | OUTPATIENT
Start: 2017-06-14

## 2017-06-14 RX ORDER — ONDANSETRON 2 MG/ML
4 INJECTION INTRAMUSCULAR; INTRAVENOUS EVERY 6 HOURS PRN
Status: CANCELLED | OUTPATIENT
Start: 2017-06-14

## 2017-06-14 RX ORDER — SODIUM CHLORIDE 0.9 % (FLUSH) 0.9 %
10 SYRINGE (ML) INJECTION PRN
Status: CANCELLED | OUTPATIENT
Start: 2017-06-14

## 2017-06-14 RX ORDER — GABAPENTIN 400 MG/1
400 CAPSULE ORAL EVERY EVENING
COMMUNITY
End: 2021-10-05 | Stop reason: ALTCHOICE

## 2017-06-14 RX ORDER — METOCLOPRAMIDE 10 MG/1
10 TABLET ORAL
Qty: 120 TABLET | Refills: 3 | Status: SHIPPED | OUTPATIENT
Start: 2017-06-14 | End: 2017-12-14 | Stop reason: ALTCHOICE

## 2017-06-14 RX ORDER — LIDOCAINE HYDROCHLORIDE 10 MG/ML
INJECTION, SOLUTION EPIDURAL; INFILTRATION; INTRACAUDAL; PERINEURAL PRN
Status: DISCONTINUED | OUTPATIENT
Start: 2017-06-14 | End: 2017-06-14 | Stop reason: SDUPTHER

## 2017-06-14 RX ORDER — METOPROLOL TARTRATE 5 MG/5ML
INJECTION INTRAVENOUS PRN
Status: DISCONTINUED | OUTPATIENT
Start: 2017-06-14 | End: 2017-06-14 | Stop reason: SDUPTHER

## 2017-06-14 RX ORDER — PROPOFOL 10 MG/ML
INJECTION, EMULSION INTRAVENOUS CONTINUOUS PRN
Status: DISCONTINUED | OUTPATIENT
Start: 2017-06-14 | End: 2017-06-14 | Stop reason: SDUPTHER

## 2017-06-14 RX ORDER — 0.9 % SODIUM CHLORIDE 0.9 %
10 VIAL (ML) INJECTION PRN
Status: DISCONTINUED | OUTPATIENT
Start: 2017-06-14 | End: 2017-06-14 | Stop reason: HOSPADM

## 2017-06-14 RX ORDER — ONDANSETRON 2 MG/ML
INJECTION INTRAMUSCULAR; INTRAVENOUS PRN
Status: DISCONTINUED | OUTPATIENT
Start: 2017-06-14 | End: 2017-06-14 | Stop reason: SDUPTHER

## 2017-06-14 RX ORDER — SODIUM CHLORIDE, SODIUM LACTATE, POTASSIUM CHLORIDE, CALCIUM CHLORIDE 600; 310; 30; 20 MG/100ML; MG/100ML; MG/100ML; MG/100ML
INJECTION, SOLUTION INTRAVENOUS CONTINUOUS
Status: DISCONTINUED | OUTPATIENT
Start: 2017-06-14 | End: 2017-06-14 | Stop reason: HOSPADM

## 2017-06-14 RX ORDER — FENTANYL CITRATE 50 UG/ML
INJECTION, SOLUTION INTRAMUSCULAR; INTRAVENOUS PRN
Status: DISCONTINUED | OUTPATIENT
Start: 2017-06-14 | End: 2017-06-14 | Stop reason: SDUPTHER

## 2017-06-14 RX ORDER — 0.9 % SODIUM CHLORIDE 0.9 %
10 VIAL (ML) INJECTION EVERY 12 HOURS SCHEDULED
Status: DISCONTINUED | OUTPATIENT
Start: 2017-06-14 | End: 2017-06-14 | Stop reason: HOSPADM

## 2017-06-14 RX ORDER — GLYCOPYRROLATE 0.2 MG/ML
INJECTION INTRAMUSCULAR; INTRAVENOUS PRN
Status: DISCONTINUED | OUTPATIENT
Start: 2017-06-14 | End: 2017-06-14 | Stop reason: SDUPTHER

## 2017-06-14 RX ORDER — SODIUM CHLORIDE, SODIUM LACTATE, POTASSIUM CHLORIDE, CALCIUM CHLORIDE 600; 310; 30; 20 MG/100ML; MG/100ML; MG/100ML; MG/100ML
INJECTION, SOLUTION INTRAVENOUS CONTINUOUS
Status: CANCELLED | OUTPATIENT
Start: 2017-06-14

## 2017-06-14 RX ORDER — SODIUM CHLORIDE 0.9 % (FLUSH) 0.9 %
10 SYRINGE (ML) INJECTION EVERY 12 HOURS SCHEDULED
Status: CANCELLED | OUTPATIENT
Start: 2017-06-14

## 2017-06-14 RX ADMIN — GLYCOPYRROLATE 0.2 MG: 0.2 INJECTION, SOLUTION INTRAMUSCULAR; INTRAVENOUS at 09:10

## 2017-06-14 RX ADMIN — MIDAZOLAM 2 MG: 1 INJECTION INTRAMUSCULAR; INTRAVENOUS at 09:10

## 2017-06-14 RX ADMIN — SODIUM CHLORIDE, POTASSIUM CHLORIDE, SODIUM LACTATE AND CALCIUM CHLORIDE: 600; 310; 30; 20 INJECTION, SOLUTION INTRAVENOUS at 08:00

## 2017-06-14 RX ADMIN — FENTANYL CITRATE 25 MCG: 50 INJECTION INTRAMUSCULAR; INTRAVENOUS at 09:10

## 2017-06-14 RX ADMIN — PROPOFOL 50 MCG/KG/MIN: 10 INJECTION, EMULSION INTRAVENOUS at 09:10

## 2017-06-14 RX ADMIN — LIDOCAINE HYDROCHLORIDE 30 MG: 10 INJECTION, SOLUTION EPIDURAL; INFILTRATION; INTRACAUDAL; PERINEURAL at 09:10

## 2017-06-14 RX ADMIN — ONDANSETRON 4 MG: 2 INJECTION INTRAMUSCULAR; INTRAVENOUS at 09:10

## 2017-06-14 RX ADMIN — METOPROLOL TARTRATE 2.5 MG: 5 INJECTION INTRAVENOUS at 09:10

## 2017-06-14 ASSESSMENT — PAIN DESCRIPTION - LOCATION: LOCATION: ABDOMEN

## 2017-06-14 ASSESSMENT — PAIN SCALES - GENERAL
PAINLEVEL_OUTOF10: 0
PAINLEVEL_OUTOF10: 5

## 2017-06-14 ASSESSMENT — PAIN DESCRIPTION - PAIN TYPE: TYPE: ACUTE PAIN

## 2017-06-14 ASSESSMENT — PAIN DESCRIPTION - DESCRIPTORS
DESCRIPTORS: ACHING
DESCRIPTORS: DULL

## 2017-06-14 ASSESSMENT — PAIN - FUNCTIONAL ASSESSMENT: PAIN_FUNCTIONAL_ASSESSMENT: 0-10

## 2017-06-15 ENCOUNTER — HOSPITAL ENCOUNTER (OUTPATIENT)
Dept: GENERAL RADIOLOGY | Age: 82
Discharge: HOME | End: 2017-06-15
Payer: MEDICARE

## 2017-06-15 LAB
DIRECT EXAM: NEGATIVE
DIRECT EXAM: NORMAL
DIRECT EXAM: NORMAL
Lab: NORMAL
SPECIMEN DESCRIPTION: NORMAL
STATUS: NORMAL
SURGICAL PATHOLOGY REPORT: NORMAL

## 2017-06-15 PROCEDURE — 74249 FL UPPER GI SMALL BOWEL DOUBLE CONTRAST: CPT

## 2017-06-20 ENCOUNTER — OFFICE VISIT (OUTPATIENT)
Dept: SURGERY | Age: 82
End: 2017-06-20
Payer: MEDICARE

## 2017-06-20 VITALS — WEIGHT: 156 LBS | HEIGHT: 68 IN | BODY MASS INDEX: 23.64 KG/M2

## 2017-06-20 DIAGNOSIS — K29.30 CHRONIC SUPERFICIAL GASTRITIS WITHOUT BLEEDING: ICD-10-CM

## 2017-06-20 DIAGNOSIS — Z98.890 S/P ENDOSCOPY: Primary | ICD-10-CM

## 2017-06-20 DIAGNOSIS — K30 DELAYED GASTRIC EMPTYING: ICD-10-CM

## 2017-06-20 PROCEDURE — 99214 OFFICE O/P EST MOD 30 MIN: CPT | Performed by: SURGERY

## 2017-06-20 PROCEDURE — G8598 ASA/ANTIPLAT THER USED: HCPCS | Performed by: SURGERY

## 2017-06-20 PROCEDURE — G8420 CALC BMI NORM PARAMETERS: HCPCS | Performed by: SURGERY

## 2017-06-20 PROCEDURE — 4040F PNEUMOC VAC/ADMIN/RCVD: CPT | Performed by: SURGERY

## 2017-06-20 PROCEDURE — 1123F ACP DISCUSS/DSCN MKR DOCD: CPT | Performed by: SURGERY

## 2017-06-20 PROCEDURE — 1036F TOBACCO NON-USER: CPT | Performed by: SURGERY

## 2017-06-20 PROCEDURE — G8427 DOCREV CUR MEDS BY ELIG CLIN: HCPCS | Performed by: SURGERY

## 2017-07-25 ENCOUNTER — HOSPITAL ENCOUNTER (OUTPATIENT)
Age: 82
Discharge: HOME | End: 2017-07-25
Payer: MEDICARE

## 2017-07-25 LAB — PROSTATE SPECIFIC ANTIGEN: 0.72 UG/L

## 2017-07-25 PROCEDURE — 36415 COLL VENOUS BLD VENIPUNCTURE: CPT

## 2017-07-25 PROCEDURE — 84153 ASSAY OF PSA TOTAL: CPT

## 2017-08-04 ENCOUNTER — HOSPITAL ENCOUNTER (OUTPATIENT)
Age: 82
Discharge: HOME | End: 2017-08-04
Payer: MEDICARE

## 2017-08-04 DIAGNOSIS — E78.5 HYPERLIPIDEMIA, UNSPECIFIED: ICD-10-CM

## 2017-08-04 LAB
CHOLESTEROL/HDL RATIO: 5.2
CHOLESTEROL: 261 MG/DL
HDLC SERPL-MCNC: 50 MG/DL
LDL CHOLESTEROL: 189 MG/DL (ref 0–130)
PATIENT FASTING?: YES
TRIGL SERPL-MCNC: 108 MG/DL
VLDLC SERPL CALC-MCNC: ABNORMAL MG/DL (ref 1–30)

## 2017-08-04 PROCEDURE — 80061 LIPID PANEL: CPT

## 2017-08-04 PROCEDURE — 36415 COLL VENOUS BLD VENIPUNCTURE: CPT

## 2017-08-08 ENCOUNTER — OFFICE VISIT (OUTPATIENT)
Dept: CARDIOLOGY CLINIC | Age: 82
End: 2017-08-08
Payer: MEDICARE

## 2017-08-08 VITALS
OXYGEN SATURATION: 99 % | DIASTOLIC BLOOD PRESSURE: 80 MMHG | BODY MASS INDEX: 23.57 KG/M2 | HEART RATE: 68 BPM | WEIGHT: 155 LBS | SYSTOLIC BLOOD PRESSURE: 140 MMHG

## 2017-08-08 DIAGNOSIS — Z98.61 HISTORY OF CORONARY ANGIOPLASTY: Primary | ICD-10-CM

## 2017-08-08 DIAGNOSIS — I10 ESSENTIAL HYPERTENSION: ICD-10-CM

## 2017-08-08 DIAGNOSIS — E55.9 VITAMIN D DEFICIENCY DISEASE: ICD-10-CM

## 2017-08-08 DIAGNOSIS — E78.5 HYPERLIPIDEMIA, UNSPECIFIED HYPERLIPIDEMIA TYPE: ICD-10-CM

## 2017-08-08 DIAGNOSIS — I25.10 CORONARY ARTERY DISEASE INVOLVING NATIVE HEART, ANGINA PRESENCE UNSPECIFIED, UNSPECIFIED VESSEL OR LESION TYPE: ICD-10-CM

## 2017-08-08 DIAGNOSIS — I25.2 HISTORY OF MYOCARDIAL INFARCTION: ICD-10-CM

## 2017-08-08 PROCEDURE — G8598 ASA/ANTIPLAT THER USED: HCPCS | Performed by: INTERNAL MEDICINE

## 2017-08-08 PROCEDURE — 4040F PNEUMOC VAC/ADMIN/RCVD: CPT | Performed by: INTERNAL MEDICINE

## 2017-08-08 PROCEDURE — 1123F ACP DISCUSS/DSCN MKR DOCD: CPT | Performed by: INTERNAL MEDICINE

## 2017-08-08 PROCEDURE — G8420 CALC BMI NORM PARAMETERS: HCPCS | Performed by: INTERNAL MEDICINE

## 2017-08-08 PROCEDURE — 1036F TOBACCO NON-USER: CPT | Performed by: INTERNAL MEDICINE

## 2017-08-08 PROCEDURE — G8427 DOCREV CUR MEDS BY ELIG CLIN: HCPCS | Performed by: INTERNAL MEDICINE

## 2017-08-08 PROCEDURE — 99213 OFFICE O/P EST LOW 20 MIN: CPT | Performed by: INTERNAL MEDICINE

## 2017-12-13 ENCOUNTER — HOSPITAL ENCOUNTER (OUTPATIENT)
Dept: GENERAL RADIOLOGY | Age: 82
Discharge: HOME | End: 2017-12-13
Payer: MEDICARE

## 2017-12-13 ENCOUNTER — HOSPITAL ENCOUNTER (OUTPATIENT)
Age: 82
Discharge: HOME | End: 2017-12-13
Payer: MEDICARE

## 2017-12-13 DIAGNOSIS — I25.2 HISTORY OF MYOCARDIAL INFARCTION: ICD-10-CM

## 2017-12-13 DIAGNOSIS — I10 ESSENTIAL HYPERTENSION: ICD-10-CM

## 2017-12-13 DIAGNOSIS — E55.9 VITAMIN D DEFICIENCY DISEASE: ICD-10-CM

## 2017-12-13 DIAGNOSIS — I25.10 CORONARY ARTERY DISEASE INVOLVING NATIVE HEART, ANGINA PRESENCE UNSPECIFIED, UNSPECIFIED VESSEL OR LESION TYPE: ICD-10-CM

## 2017-12-13 DIAGNOSIS — E78.5 HYPERLIPIDEMIA, UNSPECIFIED HYPERLIPIDEMIA TYPE: ICD-10-CM

## 2017-12-13 DIAGNOSIS — Z98.61 HISTORY OF CORONARY ANGIOPLASTY: ICD-10-CM

## 2017-12-13 PROCEDURE — 71020 XR CHEST STANDARD TWO VW: CPT

## 2017-12-14 ENCOUNTER — HOSPITAL ENCOUNTER (OUTPATIENT)
Age: 82
Setting detail: OBSERVATION
Discharge: HOME | End: 2017-12-15
Attending: EMERGENCY MEDICINE | Admitting: SURGERY
Payer: MEDICARE

## 2017-12-14 ENCOUNTER — APPOINTMENT (OUTPATIENT)
Dept: GENERAL RADIOLOGY | Age: 82
End: 2017-12-14
Payer: MEDICARE

## 2017-12-14 DIAGNOSIS — R06.02 SHORTNESS OF BREATH: Primary | ICD-10-CM

## 2017-12-14 DIAGNOSIS — J20.9 ACUTE BRONCHITIS, UNSPECIFIED ORGANISM: ICD-10-CM

## 2017-12-14 DIAGNOSIS — R07.9 CHEST PAIN, UNSPECIFIED TYPE: ICD-10-CM

## 2017-12-14 PROBLEM — J44.1 COPD WITH ACUTE EXACERBATION (HCC): Status: ACTIVE | Noted: 2017-12-14

## 2017-12-14 LAB
ABSOLUTE EOS #: 0.1 K/UL (ref 0–0.4)
ABSOLUTE IMMATURE GRANULOCYTE: ABNORMAL K/UL (ref 0–0.3)
ABSOLUTE LYMPH #: 1.5 K/UL (ref 1–4.8)
ABSOLUTE MONO #: 1.3 K/UL (ref 0–1)
ANION GAP SERPL CALCULATED.3IONS-SCNC: 24 MMOL/L (ref 9–17)
BASOPHILS # BLD: 0 % (ref 0–2)
BASOPHILS ABSOLUTE: 0 K/UL (ref 0–0.2)
BUN BLDV-MCNC: 17 MG/DL (ref 8–23)
BUN/CREAT BLD: 17 (ref 9–20)
CALCIUM SERPL-MCNC: 10.2 MG/DL (ref 8.6–10.4)
CHLORIDE BLD-SCNC: 98 MMOL/L (ref 98–107)
CO2: 18 MMOL/L (ref 20–31)
CREAT SERPL-MCNC: 1.03 MG/DL (ref 0.7–1.2)
CULTURE: ABNORMAL
DIFFERENTIAL TYPE: YES
DIRECT EXAM: ABNORMAL
DIRECT EXAM: NORMAL
EKG ATRIAL RATE: 117 BPM
EKG P AXIS: 50 DEGREES
EKG P-R INTERVAL: 162 MS
EKG Q-T INTERVAL: 328 MS
EKG QRS DURATION: 74 MS
EKG QTC CALCULATION (BAZETT): 457 MS
EKG R AXIS: -44 DEGREES
EKG T AXIS: 44 DEGREES
EKG VENTRICULAR RATE: 117 BPM
EOSINOPHILS RELATIVE PERCENT: 1 % (ref 0–5)
GFR AFRICAN AMERICAN: >60 ML/MIN
GFR NON-AFRICAN AMERICAN: >60 ML/MIN
GFR SERPL CREATININE-BSD FRML MDRD: ABNORMAL ML/MIN/{1.73_M2}
GFR SERPL CREATININE-BSD FRML MDRD: ABNORMAL ML/MIN/{1.73_M2}
GLUCOSE BLD-MCNC: 139 MG/DL (ref 70–99)
HCT VFR BLD CALC: 47.1 % (ref 41–53)
HEMOGLOBIN: 15.5 G/DL (ref 13.5–17.5)
IMMATURE GRANULOCYTES: ABNORMAL %
LYMPHOCYTES # BLD: 14 % (ref 13–44)
Lab: ABNORMAL
Lab: NORMAL
MCH RBC QN AUTO: 28.1 PG (ref 26–34)
MCHC RBC AUTO-ENTMCNC: 32.8 G/DL (ref 31–37)
MCV RBC AUTO: 85.7 FL (ref 80–100)
MONOCYTES # BLD: 12 % (ref 5–9)
PDW BLD-RTO: 14.2 % (ref 12.1–15.2)
PLATELET # BLD: 384 K/UL (ref 140–450)
PLATELET ESTIMATE: ABNORMAL
PMV BLD AUTO: ABNORMAL FL (ref 6–12)
POTASSIUM SERPL-SCNC: 3.9 MMOL/L (ref 3.7–5.3)
RBC # BLD: 5.5 M/UL (ref 4.5–5.9)
RBC # BLD: ABNORMAL 10*6/UL
SEG NEUTROPHILS: 73 % (ref 39–75)
SEGMENTED NEUTROPHILS ABSOLUTE COUNT: 8.1 K/UL (ref 2.1–6.5)
SODIUM BLD-SCNC: 140 MMOL/L (ref 135–144)
SPECIMEN DESCRIPTION: ABNORMAL
SPECIMEN DESCRIPTION: NORMAL
STATUS: ABNORMAL
STATUS: NORMAL
TROPONIN INTERP: NORMAL
TROPONIN T: <0.03 NG/ML
WBC # BLD: 11 K/UL (ref 3.5–11)
WBC # BLD: ABNORMAL 10*3/UL

## 2017-12-14 PROCEDURE — 96366 THER/PROPH/DIAG IV INF ADDON: CPT

## 2017-12-14 PROCEDURE — 80048 BASIC METABOLIC PNL TOTAL CA: CPT

## 2017-12-14 PROCEDURE — 84484 ASSAY OF TROPONIN QUANT: CPT

## 2017-12-14 PROCEDURE — 87804 INFLUENZA ASSAY W/OPTIC: CPT

## 2017-12-14 PROCEDURE — 94761 N-INVAS EAR/PLS OXIMETRY MLT: CPT

## 2017-12-14 PROCEDURE — 93005 ELECTROCARDIOGRAM TRACING: CPT

## 2017-12-14 PROCEDURE — 85025 COMPLETE CBC W/AUTO DIFF WBC: CPT

## 2017-12-14 PROCEDURE — 6360000002 HC RX W HCPCS: Performed by: EMERGENCY MEDICINE

## 2017-12-14 PROCEDURE — 6370000000 HC RX 637 (ALT 250 FOR IP): Performed by: SURGERY

## 2017-12-14 PROCEDURE — 6360000002 HC RX W HCPCS: Performed by: SURGERY

## 2017-12-14 PROCEDURE — 87070 CULTURE OTHR SPECIMN AEROBIC: CPT

## 2017-12-14 PROCEDURE — 96365 THER/PROPH/DIAG IV INF INIT: CPT

## 2017-12-14 PROCEDURE — 36415 COLL VENOUS BLD VENIPUNCTURE: CPT

## 2017-12-14 PROCEDURE — 96375 TX/PRO/DX INJ NEW DRUG ADDON: CPT

## 2017-12-14 PROCEDURE — G0378 HOSPITAL OBSERVATION PER HR: HCPCS

## 2017-12-14 PROCEDURE — 2580000003 HC RX 258: Performed by: SURGERY

## 2017-12-14 PROCEDURE — 94664 DEMO&/EVAL PT USE INHALER: CPT

## 2017-12-14 PROCEDURE — 96374 THER/PROPH/DIAG INJ IV PUSH: CPT

## 2017-12-14 PROCEDURE — 94640 AIRWAY INHALATION TREATMENT: CPT

## 2017-12-14 PROCEDURE — 6370000000 HC RX 637 (ALT 250 FOR IP): Performed by: EMERGENCY MEDICINE

## 2017-12-14 PROCEDURE — 87205 SMEAR GRAM STAIN: CPT

## 2017-12-14 PROCEDURE — 99285 EMERGENCY DEPT VISIT HI MDM: CPT

## 2017-12-14 PROCEDURE — 71010 XR CHEST LIMITED: CPT

## 2017-12-14 RX ORDER — PROPRANOLOL HYDROCHLORIDE 10 MG/1
60 TABLET ORAL 3 TIMES DAILY
Status: DISCONTINUED | OUTPATIENT
Start: 2017-12-14 | End: 2017-12-14 | Stop reason: CLARIF

## 2017-12-14 RX ORDER — PANTOPRAZOLE SODIUM 40 MG/1
40 TABLET, DELAYED RELEASE ORAL
Status: DISCONTINUED | OUTPATIENT
Start: 2017-12-14 | End: 2017-12-15 | Stop reason: HOSPADM

## 2017-12-14 RX ORDER — VENLAFAXINE 100 MG/1
100 TABLET ORAL DAILY
Status: DISCONTINUED | OUTPATIENT
Start: 2017-12-14 | End: 2017-12-14

## 2017-12-14 RX ORDER — CLONAZEPAM 0.5 MG/1
0.5 TABLET ORAL NIGHTLY
Status: DISCONTINUED | OUTPATIENT
Start: 2017-12-14 | End: 2017-12-15 | Stop reason: HOSPADM

## 2017-12-14 RX ORDER — ISOSORBIDE MONONITRATE 30 MG/1
15 TABLET, EXTENDED RELEASE ORAL DAILY
Status: DISCONTINUED | OUTPATIENT
Start: 2017-12-14 | End: 2017-12-15 | Stop reason: HOSPADM

## 2017-12-14 RX ORDER — CLONAZEPAM 0.5 MG/1
0.5 TABLET ORAL DAILY
Status: DISCONTINUED | OUTPATIENT
Start: 2017-12-14 | End: 2017-12-14

## 2017-12-14 RX ORDER — TRAMADOL HYDROCHLORIDE 50 MG/1
50 TABLET ORAL 3 TIMES DAILY PRN
COMMUNITY
End: 2020-06-15

## 2017-12-14 RX ORDER — IPRATROPIUM BROMIDE AND ALBUTEROL SULFATE 2.5; .5 MG/3ML; MG/3ML
1 SOLUTION RESPIRATORY (INHALATION) EVERY 6 HOURS
Status: DISCONTINUED | OUTPATIENT
Start: 2017-12-14 | End: 2017-12-15 | Stop reason: HOSPADM

## 2017-12-14 RX ORDER — ASPIRIN 325 MG
325 TABLET ORAL DAILY
Status: DISCONTINUED | OUTPATIENT
Start: 2017-12-14 | End: 2017-12-15 | Stop reason: HOSPADM

## 2017-12-14 RX ORDER — CIPROFLOXACIN 500 MG/1
500 TABLET, FILM COATED ORAL 2 TIMES DAILY
Status: ON HOLD | COMMUNITY
End: 2017-12-14

## 2017-12-14 RX ORDER — OMEPRAZOLE 20 MG/1
20 CAPSULE, DELAYED RELEASE ORAL 2 TIMES DAILY
Status: DISCONTINUED | OUTPATIENT
Start: 2017-12-14 | End: 2017-12-14 | Stop reason: ALTCHOICE

## 2017-12-14 RX ORDER — GABAPENTIN 400 MG/1
400 CAPSULE ORAL NIGHTLY
Status: DISCONTINUED | OUTPATIENT
Start: 2017-12-14 | End: 2017-12-14

## 2017-12-14 RX ORDER — VENLAFAXINE 25 MG/1
100 TABLET ORAL DAILY
Status: DISCONTINUED | OUTPATIENT
Start: 2017-12-14 | End: 2017-12-14 | Stop reason: CLARIF

## 2017-12-14 RX ORDER — IPRATROPIUM BROMIDE AND ALBUTEROL SULFATE 2.5; .5 MG/3ML; MG/3ML
1 SOLUTION RESPIRATORY (INHALATION) ONCE
Status: COMPLETED | OUTPATIENT
Start: 2017-12-14 | End: 2017-12-14

## 2017-12-14 RX ORDER — SODIUM CHLORIDE, SODIUM LACTATE, POTASSIUM CHLORIDE, CALCIUM CHLORIDE 600; 310; 30; 20 MG/100ML; MG/100ML; MG/100ML; MG/100ML
INJECTION, SOLUTION INTRAVENOUS CONTINUOUS
Status: DISCONTINUED | OUTPATIENT
Start: 2017-12-14 | End: 2017-12-15 | Stop reason: HOSPADM

## 2017-12-14 RX ORDER — NITROGLYCERIN 0.4 MG/1
0.4 TABLET SUBLINGUAL EVERY 5 MIN PRN
Status: DISCONTINUED | OUTPATIENT
Start: 2017-12-14 | End: 2017-12-15 | Stop reason: HOSPADM

## 2017-12-14 RX ORDER — LORAZEPAM 2 MG/ML
0.5 INJECTION INTRAMUSCULAR ONCE
Status: COMPLETED | OUTPATIENT
Start: 2017-12-14 | End: 2017-12-14

## 2017-12-14 RX ORDER — ATORVASTATIN CALCIUM 80 MG/1
80 TABLET, FILM COATED ORAL DAILY
Status: DISCONTINUED | OUTPATIENT
Start: 2017-12-14 | End: 2017-12-15 | Stop reason: HOSPADM

## 2017-12-14 RX ORDER — PROPRANOLOL HYDROCHLORIDE 60 MG/1
60 TABLET ORAL 3 TIMES DAILY
Status: DISCONTINUED | OUTPATIENT
Start: 2017-12-14 | End: 2017-12-15 | Stop reason: HOSPADM

## 2017-12-14 RX ORDER — VENLAFAXINE 25 MG/1
100 TABLET ORAL NIGHTLY
Status: DISCONTINUED | OUTPATIENT
Start: 2017-12-14 | End: 2017-12-15 | Stop reason: HOSPADM

## 2017-12-14 RX ORDER — CIPROFLOXACIN 2 MG/ML
400 INJECTION, SOLUTION INTRAVENOUS EVERY 12 HOURS
Status: DISCONTINUED | OUTPATIENT
Start: 2017-12-14 | End: 2017-12-15 | Stop reason: HOSPADM

## 2017-12-14 RX ORDER — CLOPIDOGREL BISULFATE 75 MG/1
75 TABLET ORAL DAILY
Status: DISCONTINUED | OUTPATIENT
Start: 2017-12-14 | End: 2017-12-15 | Stop reason: HOSPADM

## 2017-12-14 RX ADMIN — SODIUM CHLORIDE, POTASSIUM CHLORIDE, SODIUM LACTATE AND CALCIUM CHLORIDE: 600; 310; 30; 20 INJECTION, SOLUTION INTRAVENOUS at 20:21

## 2017-12-14 RX ADMIN — ISOSORBIDE MONONITRATE 15 MG: 30 TABLET, EXTENDED RELEASE ORAL at 11:02

## 2017-12-14 RX ADMIN — CLONAZEPAM 0.5 MG: 0.5 TABLET ORAL at 11:02

## 2017-12-14 RX ADMIN — IPRATROPIUM BROMIDE AND ALBUTEROL SULFATE 1 AMPULE: .5; 3 SOLUTION RESPIRATORY (INHALATION) at 10:14

## 2017-12-14 RX ADMIN — PROPRANOLOL HYDROCHLORIDE 60 MG: 10 TABLET ORAL at 11:01

## 2017-12-14 RX ADMIN — IPRATROPIUM BROMIDE AND ALBUTEROL SULFATE 1 AMPULE: .5; 3 SOLUTION RESPIRATORY (INHALATION) at 16:39

## 2017-12-14 RX ADMIN — VENLAFAXINE 100 MG: 25 TABLET ORAL at 11:01

## 2017-12-14 RX ADMIN — IPRATROPIUM BROMIDE AND ALBUTEROL SULFATE 1 AMPULE: .5; 3 SOLUTION RESPIRATORY (INHALATION) at 04:39

## 2017-12-14 RX ADMIN — CIPROFLOXACIN 400 MG: 2 INJECTION, SOLUTION INTRAVENOUS at 20:21

## 2017-12-14 RX ADMIN — CLONAZEPAM 0.5 MG: 0.5 TABLET ORAL at 20:26

## 2017-12-14 RX ADMIN — Medication 325 MG: at 11:03

## 2017-12-14 RX ADMIN — IPRATROPIUM BROMIDE AND ALBUTEROL SULFATE 1 AMPULE: .5; 3 SOLUTION RESPIRATORY (INHALATION) at 21:47

## 2017-12-14 RX ADMIN — CLOPIDOGREL BISULFATE 75 MG: 75 TABLET ORAL at 11:02

## 2017-12-14 RX ADMIN — CIPROFLOXACIN 400 MG: 2 INJECTION, SOLUTION INTRAVENOUS at 08:48

## 2017-12-14 RX ADMIN — ATORVASTATIN CALCIUM 80 MG: 80 TABLET, FILM COATED ORAL at 11:03

## 2017-12-14 RX ADMIN — SODIUM CHLORIDE, POTASSIUM CHLORIDE, SODIUM LACTATE AND CALCIUM CHLORIDE: 600; 310; 30; 20 INJECTION, SOLUTION INTRAVENOUS at 07:00

## 2017-12-14 RX ADMIN — LORAZEPAM 0.5 MG: 2 INJECTION, SOLUTION INTRAMUSCULAR; INTRAVENOUS at 04:51

## 2017-12-14 RX ADMIN — PANTOPRAZOLE SODIUM 40 MG: 40 TABLET, DELAYED RELEASE ORAL at 08:47

## 2017-12-14 RX ADMIN — VENLAFAXINE 100 MG: 25 TABLET ORAL at 20:25

## 2017-12-14 ASSESSMENT — PAIN SCALES - GENERAL
PAINLEVEL_OUTOF10: 0
PAINLEVEL_OUTOF10: 2

## 2017-12-14 NOTE — PROGRESS NOTES
Subjective: Patient complains of weakness and frequent coughing. Objective: chest slow expiration no rales no rhonchi no wheezing. Card rrr no rales  Carotids no bruits . No ankle oedema. Abdomen soft normal bowel sounds . Digital rectal exam : anal stenosis , hemoccult negative. Assessment: weakness laryngitis acute bronchitis acute exacerbation of copd. Plan: continue present regimen  All of the patients questions answered.           Gray Patel MD

## 2017-12-14 NOTE — H&P
History and Physical      Magali Booth is a [de-identified] y.o. male  YOB: 1935      Chief complaint: shortness of breath and weakness   Present Illness: patient has had respiratory tract infection. He awakens and has shortness of breath. He says he told his wife to \"call the squad\" and he is brought to the emergency room here. He is evaluated and his chest xray from 12/13 was reviewed. He is extremely anxious and has a dense cardiac history which includes at least 13 cardiac stents and multiple cardiac catheterizations. He splits his care amongst many physicians and receives care through the [facility] [employer] administration. Family History  Diabetes No  Heart Disease Yes  Tuberculosis No  Cancer No  Other: arthritis      Family History: arthritis, cardiovascular history     Past History  Asthma No  Chronic Bronchitis No  Emphysema Yes  Tuberculosis No  Smokes Yes (x smoker abstinent for years?)  Head Injury No  Epilepsy No  Kidney Disease No  Liver Disease/Hepatitis No  Yellow Jaundice No  Diabetes No  Thyroid Disease No  Heart Disease Yes  High Blood Pressure Yes  Angina Yes  Rheumatic Fever No  Cortisone Rx No  Alcohol Excess  He denies   Pregnancy No  Glaucoma No  Loose Teeth No  Tranquilizers Yes  Other:         Past History: gastric ulcers, hiatus hernia with chronic GERD, anal stenosis (post surgical) joint replacements , cardia , hyperlipidemia , diverticulosis, colon polyps , restless leg syndrome, splenic cyst and rul pulmonary nodule (static), chronic anxiety, questionable depression. Social History: , medically retired from the [employer], children    Psychosocial Needs: very anxious chronically     Present Medication:  Prior to Admission medications    Medication Sig Start Date End Date Taking?  Authorizing Provider   traMADol (ULTRAM) 50 MG tablet Take 50 mg by mouth 3 times daily as needed for Pain  Do not take in close timeframe with Clonazepam .   Yes Historical Provider, MD   aspirin 325 60 mg, 60 mg, Oral, TID  venlafaxine (EFFEXOR) tablet 100 mg, 100 mg, Oral, Daily    Allergies: Pcn [penicillins] and Ranolazine    Previous Surgery: gastric resections times two, cholecystectomy, appendectomy (an appendix is noted present on ct scan 2/2017),  radical hemorrhoidectomy, colonoscopies and upper gi endoscopies, knee and hip replacement multiple coronary artery stents and multiple cardiac catheterizations, inguinal herniorrhaphy. Physical Examination    Constitutional: fair state of nourishment     Neurological: oriented times three cn 2-12 intact with hearing deficit chronic  ( he says that [facility] doesn't want to operate on right ear because of his age and cardiac status). Eyes: perrla eom intact conj pink non icteric glasses    Lymphatic wnl     Neck/Ear/Nose/Throat: no carotid bruits no masses in neck hoarseness otoscopy left tm wnl and right shows serous otitis media . Respiratory: slow expiration no wheezing no rales some crackles in right posterior base on expiration. No stridor during inspiration nor expiration. Slightly productive chronic coughing sometimes in paroxysms. Cardiovascular: rrr MRM grade 1/6    Abdomen/GI: soft no masses no peritoneal signs multiple surgical scars no hernia. Musculoskeletal: symmetrical muscle masses     G/U female: na                         G/U male: wnl male digital rectal examination:  Anal stenosis with only 5 th finger admitted (present for many years) stool viscous and hemoccult negative. Prostate slight enlarged non tender no mushy. Chest/Breasts: breasts negative for masses     Skin/Integumentary: solar elastosis chronic sun damaged    Psychological: anxious flat affect    Other: slightly dehydrated (usual chart weight 158 lbs dressed fully)           Conclusion: acute exacerbation of copd secondary to acute respiratory infection.           Planned course of Action: admit for observation          The beneficiary may reasonably be expected to be discharged or transferred to a hospital within 96 hours after admission. Estimated length of stay 2    Medical Necessity:   In patient is appropriate for this patient shortness of breath, multiple co morbidities including dense cardiac history     Time In:    Time Out:           Cezar Falcon MD

## 2017-12-14 NOTE — PROGRESS NOTES
Per RN, Sangeetha Payton, the patient is unable to tell us how he takes medications, and the medication bottles directions \"are wrong\" and the medication reconciliation list \"is right. \" 9 medication bottles reviewed and medications identified using [place]. There was 4 medication bottle vs med rec discrepancies. Propranolol 60 mg capsule bottle says once daily, med rec list says 60 mg tablet 3 times daily. Venlafaxine 100 mg tablet 1/2 tablet twice daily, med rec list says 1 tablet daily. Imdur 30 mg bottle says 1 tablet twice daily and med rec list says 1/2 tablet daily (and the bottle contains tablets cut in half). Neurontin 400 mg bottle says 3 times daily, med rec list says once daily. Prilosec is listed on the med rec list, but patient doesn't present  a bottle of any.      Electronically signed by Cass Salazar, [address] [place] [address] on 12/14/2017 at 5:28 PM

## 2017-12-14 NOTE — PLAN OF CARE
Problem: Pain:  Goal: Patient's pain/discomfort is manageable  Patient's pain/discomfort is manageable   Outcome: Met This Shift  Pt has denied pain this shift - will continue to monitor.

## 2017-12-14 NOTE — PROGRESS NOTES
Pt asleep in bed, vital signs and assessment completed. Lungs clear, pt does have productive cough. IVF infusing as ordered. Pt offers no complaints at this time. Call light within reach.

## 2017-12-14 NOTE — ED NOTES
Pt arrives per EMS with c/o chest pain, on arrival pt states his pain is gone, has had 2 nitro per ems, pt just states \"I'm sick\", wife here and pt states she's been so sick too, pt placed on Cipro today per Dr. Ann Daigle states was treating for pleurisy, flu swab sent.      Steven Olivo RN  12/14/17 [id number]

## 2017-12-15 ENCOUNTER — APPOINTMENT (OUTPATIENT)
Dept: GENERAL RADIOLOGY | Age: 82
End: 2017-12-15
Payer: MEDICARE

## 2017-12-15 VITALS
OXYGEN SATURATION: 99 % | HEIGHT: 70 IN | WEIGHT: 149.2 LBS | TEMPERATURE: 97.6 F | BODY MASS INDEX: 21.36 KG/M2 | DIASTOLIC BLOOD PRESSURE: 77 MMHG | SYSTOLIC BLOOD PRESSURE: 128 MMHG | HEART RATE: 66 BPM | RESPIRATION RATE: 20 BRPM

## 2017-12-15 LAB
ABSOLUTE BANDS #: 0.15 K/UL (ref 0–1)
ABSOLUTE EOS #: 0.22 K/UL (ref 0–0.4)
ABSOLUTE IMMATURE GRANULOCYTE: ABNORMAL K/UL (ref 0–0.3)
ABSOLUTE LYMPH #: 1.26 K/UL (ref 1–4.8)
ABSOLUTE MONO #: 0.67 K/UL (ref 0–1)
ANION GAP SERPL CALCULATED.3IONS-SCNC: 13 MMOL/L (ref 9–17)
ATYPICAL LYMPHOCYTE ABSOLUTE COUNT: 0.3 K/UL (ref 0–1)
ATYPICAL LYMPHOCYTES: 4 %
BANDS: 2 % (ref 0–10)
BASOPHILS # BLD: 1 % (ref 0–2)
BASOPHILS ABSOLUTE: 0.07 K/UL (ref 0–0.2)
BUN BLDV-MCNC: 16 MG/DL (ref 8–23)
BUN/CREAT BLD: 18 (ref 9–20)
CALCIUM SERPL-MCNC: 9.2 MG/DL (ref 8.6–10.4)
CHLORIDE BLD-SCNC: 101 MMOL/L (ref 98–107)
CO2: 24 MMOL/L (ref 20–31)
CREAT SERPL-MCNC: 0.87 MG/DL (ref 0.7–1.2)
EOSINOPHILS RELATIVE PERCENT: 3 % (ref 0–5)
GFR AFRICAN AMERICAN: >60 ML/MIN
GFR NON-AFRICAN AMERICAN: >60 ML/MIN
GFR SERPL CREATININE-BSD FRML MDRD: ABNORMAL ML/MIN/{1.73_M2}
GFR SERPL CREATININE-BSD FRML MDRD: ABNORMAL ML/MIN/{1.73_M2}
GLUCOSE BLD-MCNC: 122 MG/DL (ref 70–99)
HCT VFR BLD CALC: 36.4 % (ref 41–53)
HEMOGLOBIN: 12.1 G/DL (ref 13.5–17.5)
IMMATURE GRANULOCYTES: ABNORMAL %
LYMPHOCYTES # BLD: 17 % (ref 13–44)
MCH RBC QN AUTO: 28.2 PG (ref 26–34)
MCHC RBC AUTO-ENTMCNC: 33.3 G/DL (ref 31–37)
MCV RBC AUTO: 84.8 FL (ref 80–100)
MONOCYTES # BLD: 9 % (ref 5–9)
MORPHOLOGY: ABNORMAL
PDW BLD-RTO: 13.9 % (ref 12.1–15.2)
PLATELET # BLD: 325 K/UL (ref 140–450)
PLATELET ESTIMATE: ABNORMAL
PMV BLD AUTO: ABNORMAL FL (ref 6–12)
POTASSIUM SERPL-SCNC: 3.9 MMOL/L (ref 3.7–5.3)
RBC # BLD: 4.3 M/UL (ref 4.5–5.9)
RBC # BLD: ABNORMAL 10*6/UL
SEG NEUTROPHILS: 64 % (ref 39–75)
SEGMENTED NEUTROPHILS ABSOLUTE COUNT: 4.73 K/UL (ref 2.1–6.5)
SODIUM BLD-SCNC: 138 MMOL/L (ref 135–144)
WBC # BLD: 7.4 K/UL (ref 3.5–11)
WBC # BLD: ABNORMAL 10*3/UL

## 2017-12-15 PROCEDURE — 36415 COLL VENOUS BLD VENIPUNCTURE: CPT

## 2017-12-15 PROCEDURE — 71010 XR CHEST PORTABLE: CPT

## 2017-12-15 PROCEDURE — 94640 AIRWAY INHALATION TREATMENT: CPT

## 2017-12-15 PROCEDURE — 96366 THER/PROPH/DIAG IV INF ADDON: CPT

## 2017-12-15 PROCEDURE — G0378 HOSPITAL OBSERVATION PER HR: HCPCS

## 2017-12-15 PROCEDURE — 85007 BL SMEAR W/DIFF WBC COUNT: CPT

## 2017-12-15 PROCEDURE — 6370000000 HC RX 637 (ALT 250 FOR IP): Performed by: SURGERY

## 2017-12-15 PROCEDURE — 94761 N-INVAS EAR/PLS OXIMETRY MLT: CPT

## 2017-12-15 PROCEDURE — 80048 BASIC METABOLIC PNL TOTAL CA: CPT

## 2017-12-15 PROCEDURE — 6360000002 HC RX W HCPCS: Performed by: SURGERY

## 2017-12-15 PROCEDURE — 85027 COMPLETE CBC AUTOMATED: CPT

## 2017-12-15 RX ADMIN — ISOSORBIDE MONONITRATE 15 MG: 30 TABLET, EXTENDED RELEASE ORAL at 08:50

## 2017-12-15 RX ADMIN — CIPROFLOXACIN 400 MG: 2 INJECTION, SOLUTION INTRAVENOUS at 06:54

## 2017-12-15 RX ADMIN — CLOPIDOGREL BISULFATE 75 MG: 75 TABLET ORAL at 08:50

## 2017-12-15 RX ADMIN — ATORVASTATIN CALCIUM 80 MG: 80 TABLET, FILM COATED ORAL at 08:50

## 2017-12-15 RX ADMIN — IPRATROPIUM BROMIDE AND ALBUTEROL SULFATE 1 AMPULE: .5; 3 SOLUTION RESPIRATORY (INHALATION) at 09:52

## 2017-12-15 RX ADMIN — PANTOPRAZOLE SODIUM 40 MG: 40 TABLET, DELAYED RELEASE ORAL at 06:54

## 2017-12-15 RX ADMIN — PROPRANOLOL HYDROCHLORIDE 60 MG: 60 TABLET ORAL at 08:50

## 2017-12-15 RX ADMIN — IPRATROPIUM BROMIDE AND ALBUTEROL SULFATE 1 AMPULE: .5; 3 SOLUTION RESPIRATORY (INHALATION) at 04:08

## 2017-12-15 RX ADMIN — Medication 325 MG: at 08:51

## 2017-12-15 NOTE — PROGRESS NOTES
Upon entering room patient states \"I hope you got my medicines because I fought in 2 wars and you don't want to see me upset\"  Pt instructed that I got his medication changed and that there is no reason to be upset.

## 2017-12-15 NOTE — PROGRESS NOTES
Pt awake in bed. States \"that girl was supposed to give me medicine before 8 and I haven't gotten it yet\". Pt questioned about what medication he is talking about. Pt states that he takes his klonopin and effexor at night. States \"I don't know why you make me bring my medicine then you won't give it to me\"  Pt informed of how his medication is ordered. He states that he wants his medication tonight. Dr. Gennaro Eaton called concerning this issue with orders obtained.

## 2017-12-15 NOTE — PLAN OF CARE
Problem: Gas Exchange - Impaired:  Goal: Levels of oxygenation will improve  Levels of oxygenation will improve  Outcome: Ongoing  SPO2 is 95% on room air.

## 2017-12-15 NOTE — PROGRESS NOTES
Subjective: Patient reports he feels a little better and is stronger than on admission. He reports that he feels improved enough to go home. Objective: chest clear to ascultation with slow expiration and a few scattered rales posteriorly and bilaterally. His paroxysms of coughing are better, but still occasional.  He is eating better. No ankle oedema and no desaturation. He is still hoarse. His white blood cell count is improved and shifting to right. Assessment: acute respiratory tract infection with exacerbation of  Copd, acute laryngitis and multiple co morbidities. Plan: continue antibiotics that you have at home. Keep hydration level up with Gatorade or sport drink. We discuss medications and follow up. All his questions answered.         Juvencio Willard MD

## 2017-12-18 ENCOUNTER — CARE COORDINATION (OUTPATIENT)
Dept: MEDSURG UNIT | Age: 82
End: 2017-12-18

## 2018-03-22 ENCOUNTER — HOSPITAL ENCOUNTER (OUTPATIENT)
Age: 83
Discharge: HOME | End: 2018-03-24
Payer: MEDICARE

## 2018-03-22 ENCOUNTER — HOSPITAL ENCOUNTER (OUTPATIENT)
Dept: GENERAL RADIOLOGY | Age: 83
Discharge: HOME | End: 2018-03-24
Payer: MEDICARE

## 2018-03-22 DIAGNOSIS — M79.645 PAIN OF LEFT THUMB: ICD-10-CM

## 2018-03-22 PROCEDURE — 73130 X-RAY EXAM OF HAND: CPT

## 2018-04-08 ENCOUNTER — HOSPITAL ENCOUNTER (EMERGENCY)
Age: 83
Discharge: HOME | End: 2018-04-08
Attending: EMERGENCY MEDICINE
Payer: MEDICARE

## 2018-04-08 ENCOUNTER — APPOINTMENT (OUTPATIENT)
Dept: CT IMAGING | Age: 83
End: 2018-04-08
Payer: MEDICARE

## 2018-04-08 VITALS
WEIGHT: 160 LBS | OXYGEN SATURATION: 100 % | DIASTOLIC BLOOD PRESSURE: 99 MMHG | HEART RATE: 92 BPM | RESPIRATION RATE: 22 BRPM | TEMPERATURE: 97.6 F | BODY MASS INDEX: 22.96 KG/M2 | SYSTOLIC BLOOD PRESSURE: 175 MMHG

## 2018-04-08 DIAGNOSIS — N21.9 CALCULUS OF LOWER URINARY TRACT: Primary | ICD-10-CM

## 2018-04-08 LAB
ABSOLUTE EOS #: 0.1 K/UL (ref 0–0.4)
ABSOLUTE IMMATURE GRANULOCYTE: ABNORMAL K/UL (ref 0–0.3)
ABSOLUTE LYMPH #: 1.3 K/UL (ref 1–4.8)
ABSOLUTE MONO #: 0.8 K/UL (ref 0–1)
ALBUMIN SERPL-MCNC: 4.2 G/DL (ref 3.5–5.2)
ALBUMIN/GLOBULIN RATIO: ABNORMAL (ref 1–2.5)
ALP BLD-CCNC: 94 U/L (ref 40–129)
ALT SERPL-CCNC: 22 U/L (ref 5–41)
ANION GAP SERPL CALCULATED.3IONS-SCNC: 12 MMOL/L (ref 9–17)
AST SERPL-CCNC: 32 U/L
BASOPHILS # BLD: 1 % (ref 0–2)
BASOPHILS ABSOLUTE: 0 K/UL (ref 0–0.2)
BILIRUB SERPL-MCNC: 0.44 MG/DL (ref 0.3–1.2)
BUN BLDV-MCNC: 25 MG/DL (ref 8–23)
BUN/CREAT BLD: 22 (ref 9–20)
CALCIUM SERPL-MCNC: 9.7 MG/DL (ref 8.6–10.4)
CHLORIDE BLD-SCNC: 100 MMOL/L (ref 98–107)
CO2: 26 MMOL/L (ref 20–31)
CREAT SERPL-MCNC: 1.14 MG/DL (ref 0.7–1.2)
DIFFERENTIAL TYPE: YES
EOSINOPHILS RELATIVE PERCENT: 2 % (ref 0–5)
GFR AFRICAN AMERICAN: >60 ML/MIN
GFR NON-AFRICAN AMERICAN: >60 ML/MIN
GFR SERPL CREATININE-BSD FRML MDRD: ABNORMAL ML/MIN/{1.73_M2}
GFR SERPL CREATININE-BSD FRML MDRD: ABNORMAL ML/MIN/{1.73_M2}
GLUCOSE BLD-MCNC: 149 MG/DL (ref 70–99)
HCT VFR BLD CALC: 44.4 % (ref 41–53)
HEMOGLOBIN: 15.1 G/DL (ref 13.5–17.5)
IMMATURE GRANULOCYTES: ABNORMAL %
LIPASE: 22 U/L (ref 13–60)
LYMPHOCYTES # BLD: 18 % (ref 13–44)
MCH RBC QN AUTO: 28.6 PG (ref 26–34)
MCHC RBC AUTO-ENTMCNC: 34 G/DL (ref 31–37)
MCV RBC AUTO: 84 FL (ref 80–100)
MONOCYTES # BLD: 12 % (ref 5–9)
NRBC AUTOMATED: ABNORMAL PER 100 WBC
PDW BLD-RTO: 15.2 % (ref 12.1–15.2)
PLATELET # BLD: 323 K/UL (ref 140–450)
PLATELET ESTIMATE: ABNORMAL
PMV BLD AUTO: ABNORMAL FL (ref 6–12)
POTASSIUM SERPL-SCNC: 5.6 MMOL/L (ref 3.7–5.3)
RBC # BLD: 5.28 M/UL (ref 4.5–5.9)
RBC # BLD: ABNORMAL 10*6/UL
SEG NEUTROPHILS: 67 % (ref 39–75)
SEGMENTED NEUTROPHILS ABSOLUTE COUNT: 4.8 K/UL (ref 2.1–6.5)
SODIUM BLD-SCNC: 138 MMOL/L (ref 135–144)
TOTAL PROTEIN: 7.4 G/DL (ref 6.4–8.3)
WBC # BLD: 7 K/UL (ref 3.5–11)
WBC # BLD: ABNORMAL 10*3/UL

## 2018-04-08 PROCEDURE — 2580000003 HC RX 258: Performed by: EMERGENCY MEDICINE

## 2018-04-08 PROCEDURE — 85025 COMPLETE CBC W/AUTO DIFF WBC: CPT

## 2018-04-08 PROCEDURE — 96374 THER/PROPH/DIAG INJ IV PUSH: CPT

## 2018-04-08 PROCEDURE — 96375 TX/PRO/DX INJ NEW DRUG ADDON: CPT

## 2018-04-08 PROCEDURE — 99284 EMERGENCY DEPT VISIT MOD MDM: CPT

## 2018-04-08 PROCEDURE — 74176 CT ABD & PELVIS W/O CONTRAST: CPT

## 2018-04-08 PROCEDURE — 6360000002 HC RX W HCPCS: Performed by: EMERGENCY MEDICINE

## 2018-04-08 PROCEDURE — 80053 COMPREHEN METABOLIC PANEL: CPT

## 2018-04-08 PROCEDURE — 83690 ASSAY OF LIPASE: CPT

## 2018-04-08 RX ORDER — MORPHINE SULFATE 4 MG/ML
4 INJECTION, SOLUTION INTRAMUSCULAR; INTRAVENOUS ONCE
Status: COMPLETED | OUTPATIENT
Start: 2018-04-08 | End: 2018-04-08

## 2018-04-08 RX ORDER — NAPROXEN 375 MG/1
375 TABLET ORAL 2 TIMES DAILY
Qty: 12 TABLET | Refills: 0 | Status: SHIPPED | OUTPATIENT
Start: 2018-04-08 | End: 2019-06-14 | Stop reason: ALTCHOICE

## 2018-04-08 RX ORDER — 0.9 % SODIUM CHLORIDE 0.9 %
1000 INTRAVENOUS SOLUTION INTRAVENOUS ONCE
Status: COMPLETED | OUTPATIENT
Start: 2018-04-08 | End: 2018-04-08

## 2018-04-08 RX ORDER — KETOROLAC TROMETHAMINE 30 MG/ML
15 INJECTION, SOLUTION INTRAMUSCULAR; INTRAVENOUS ONCE
Status: COMPLETED | OUTPATIENT
Start: 2018-04-08 | End: 2018-04-08

## 2018-04-08 RX ORDER — HYDROCODONE BITARTRATE AND ACETAMINOPHEN 5; 325 MG/1; MG/1
1 TABLET ORAL EVERY 6 HOURS PRN
Qty: 10 TABLET | Refills: 0 | Status: SHIPPED | OUTPATIENT
Start: 2018-04-08 | End: 2018-04-15

## 2018-04-08 RX ORDER — DIPHENHYDRAMINE HYDROCHLORIDE 50 MG/ML
12.5 INJECTION INTRAMUSCULAR; INTRAVENOUS ONCE
Status: COMPLETED | OUTPATIENT
Start: 2018-04-08 | End: 2018-04-08

## 2018-04-08 RX ADMIN — SODIUM CHLORIDE 1000 ML: 9 INJECTION, SOLUTION INTRAVENOUS at 12:18

## 2018-04-08 RX ADMIN — KETOROLAC TROMETHAMINE 15 MG: 30 INJECTION, SOLUTION INTRAMUSCULAR at 13:05

## 2018-04-08 RX ADMIN — MORPHINE SULFATE 4 MG: 4 INJECTION INTRAVENOUS at 12:18

## 2018-04-08 RX ADMIN — DIPHENHYDRAMINE HYDROCHLORIDE 12.5 MG: 50 INJECTION, SOLUTION INTRAMUSCULAR; INTRAVENOUS at 12:18

## 2018-04-08 ASSESSMENT — PAIN DESCRIPTION - DESCRIPTORS: DESCRIPTORS: CRAMPING

## 2018-04-08 ASSESSMENT — PAIN DESCRIPTION - PROGRESSION: CLINICAL_PROGRESSION: NOT CHANGED

## 2018-04-08 ASSESSMENT — PAIN SCALES - GENERAL
PAINLEVEL_OUTOF10: 10

## 2018-04-08 ASSESSMENT — PAIN DESCRIPTION - LOCATION: LOCATION: ABDOMEN

## 2018-04-08 ASSESSMENT — PAIN DESCRIPTION - PAIN TYPE: TYPE: ACUTE PAIN

## 2018-04-08 ASSESSMENT — PAIN DESCRIPTION - ONSET: ONSET: ON-GOING

## 2018-04-08 ASSESSMENT — PAIN DESCRIPTION - ORIENTATION: ORIENTATION: LEFT;LOWER

## 2018-04-10 ENCOUNTER — OFFICE VISIT (OUTPATIENT)
Dept: UROLOGY | Age: 83
End: 2018-04-10
Payer: MEDICARE

## 2018-04-10 ENCOUNTER — HOSPITAL ENCOUNTER (OUTPATIENT)
Age: 83
Setting detail: SPECIMEN
Discharge: HOME | End: 2018-04-10
Payer: MEDICARE

## 2018-04-10 VITALS
DIASTOLIC BLOOD PRESSURE: 62 MMHG | BODY MASS INDEX: 25.01 KG/M2 | SYSTOLIC BLOOD PRESSURE: 102 MMHG | WEIGHT: 165 LBS | HEIGHT: 68 IN | TEMPERATURE: 97.6 F

## 2018-04-10 DIAGNOSIS — N13.2 URETERAL STONE WITH HYDRONEPHROSIS: ICD-10-CM

## 2018-04-10 DIAGNOSIS — N40.1 BPH WITH OBSTRUCTION/LOWER URINARY TRACT SYMPTOMS: ICD-10-CM

## 2018-04-10 DIAGNOSIS — N39.43 POST-VOID DRIBBLING: ICD-10-CM

## 2018-04-10 DIAGNOSIS — N13.2 URETERAL STONE WITH HYDRONEPHROSIS: Primary | ICD-10-CM

## 2018-04-10 DIAGNOSIS — R35.1 NOCTURIA: ICD-10-CM

## 2018-04-10 DIAGNOSIS — R30.0 DYSURIA: ICD-10-CM

## 2018-04-10 DIAGNOSIS — K59.03 DRUG-INDUCED CONSTIPATION: ICD-10-CM

## 2018-04-10 DIAGNOSIS — N13.8 BPH WITH OBSTRUCTION/LOWER URINARY TRACT SYMPTOMS: ICD-10-CM

## 2018-04-10 LAB
-: ABNORMAL
AMORPHOUS: ABNORMAL
BACTERIA: ABNORMAL
BILIRUBIN URINE: ABNORMAL
CASTS UA: ABNORMAL /LPF
COLOR: YELLOW
COMMENT UA: ABNORMAL
CRYSTALS, UA: ABNORMAL /HPF
EPITHELIAL CELLS UA: ABNORMAL /HPF (ref 0–5)
GLUCOSE URINE: NEGATIVE
KETONES, URINE: ABNORMAL
LEUKOCYTE ESTERASE, URINE: NEGATIVE
MUCUS: ABNORMAL
NITRITE, URINE: NEGATIVE
OTHER OBSERVATIONS UA: ABNORMAL
PH UA: 5.5 (ref 5–9)
PROTEIN UA: ABNORMAL
RBC UA: ABNORMAL /HPF (ref 0–2)
RENAL EPITHELIAL, UA: ABNORMAL /HPF
SPECIFIC GRAVITY UA: >1.03 (ref 1.01–1.02)
TRICHOMONAS: ABNORMAL
TURBIDITY: ABNORMAL
URINE HGB: ABNORMAL
UROBILINOGEN, URINE: NORMAL
WBC UA: ABNORMAL /HPF (ref 0–5)
YEAST: ABNORMAL

## 2018-04-10 PROCEDURE — G8427 DOCREV CUR MEDS BY ELIG CLIN: HCPCS | Performed by: NURSE PRACTITIONER

## 2018-04-10 PROCEDURE — 1123F ACP DISCUSS/DSCN MKR DOCD: CPT | Performed by: NURSE PRACTITIONER

## 2018-04-10 PROCEDURE — 99205 OFFICE O/P NEW HI 60 MIN: CPT | Performed by: NURSE PRACTITIONER

## 2018-04-10 PROCEDURE — 4040F PNEUMOC VAC/ADMIN/RCVD: CPT | Performed by: NURSE PRACTITIONER

## 2018-04-10 PROCEDURE — 1036F TOBACCO NON-USER: CPT | Performed by: NURSE PRACTITIONER

## 2018-04-10 PROCEDURE — 87086 URINE CULTURE/COLONY COUNT: CPT

## 2018-04-10 PROCEDURE — G8598 ASA/ANTIPLAT THER USED: HCPCS | Performed by: NURSE PRACTITIONER

## 2018-04-10 PROCEDURE — 81001 URINALYSIS AUTO W/SCOPE: CPT

## 2018-04-10 PROCEDURE — G8419 CALC BMI OUT NRM PARAM NOF/U: HCPCS | Performed by: NURSE PRACTITIONER

## 2018-04-10 RX ORDER — CIPROFLOXACIN 500 MG/1
500 TABLET, FILM COATED ORAL 2 TIMES DAILY
Qty: 20 TABLET | Refills: 0 | Status: SHIPPED | OUTPATIENT
Start: 2018-04-10 | End: 2018-04-20

## 2018-04-10 RX ORDER — DOCUSATE SODIUM 100 MG/1
100 CAPSULE, LIQUID FILLED ORAL 3 TIMES DAILY PRN
Qty: 45 CAPSULE | Refills: 0 | Status: SHIPPED | OUTPATIENT
Start: 2018-04-10

## 2018-04-10 RX ORDER — ONDANSETRON 4 MG/1
4 TABLET, FILM COATED ORAL EVERY 6 HOURS PRN
Qty: 30 TABLET | Refills: 0 | Status: SHIPPED | OUTPATIENT
Start: 2018-04-10

## 2018-04-10 RX ORDER — TAMSULOSIN HYDROCHLORIDE 0.4 MG/1
0.4 CAPSULE ORAL EVERY EVENING
Qty: 30 CAPSULE | Refills: 11 | Status: SHIPPED | OUTPATIENT
Start: 2018-04-10 | End: 2019-06-14 | Stop reason: ALTCHOICE

## 2018-04-10 ASSESSMENT — ENCOUNTER SYMPTOMS
SHORTNESS OF BREATH: 0
BACK PAIN: 0
NAUSEA: 0
EYE PAIN: 0
COLOR CHANGE: 0
COUGH: 0
ABDOMINAL PAIN: 0
VOMITING: 0
CONSTIPATION: 1
WHEEZING: 0
EYE REDNESS: 0

## 2018-04-11 LAB
CULTURE: NO GROWTH
CULTURE: NORMAL
Lab: NORMAL
Lab: NORMAL
SPECIMEN DESCRIPTION: NORMAL
SPECIMEN DESCRIPTION: NORMAL
STATUS: NORMAL

## 2018-04-16 ENCOUNTER — TELEPHONE (OUTPATIENT)
Dept: UROLOGY | Age: 83
End: 2018-04-16

## 2018-04-16 DIAGNOSIS — N20.0 RENAL STONE: Primary | ICD-10-CM

## 2018-05-16 ENCOUNTER — HOSPITAL ENCOUNTER (OUTPATIENT)
Age: 83
Discharge: HOME | End: 2018-05-18
Payer: MEDICARE

## 2018-05-16 ENCOUNTER — HOSPITAL ENCOUNTER (OUTPATIENT)
Age: 83
Discharge: HOME | End: 2018-05-16
Payer: MEDICARE

## 2018-05-16 ENCOUNTER — HOSPITAL ENCOUNTER (OUTPATIENT)
Dept: GENERAL RADIOLOGY | Age: 83
Discharge: HOME | End: 2018-05-18
Payer: MEDICARE

## 2018-05-16 DIAGNOSIS — Z98.61 HISTORY OF CORONARY ANGIOPLASTY: ICD-10-CM

## 2018-05-16 DIAGNOSIS — I10 ESSENTIAL HYPERTENSION: ICD-10-CM

## 2018-05-16 DIAGNOSIS — I25.2 HISTORY OF MYOCARDIAL INFARCTION: ICD-10-CM

## 2018-05-16 DIAGNOSIS — E55.9 VITAMIN D DEFICIENCY DISEASE: ICD-10-CM

## 2018-05-16 DIAGNOSIS — I25.10 CORONARY ARTERY DISEASE INVOLVING NATIVE HEART WITHOUT ANGINA PECTORIS, UNSPECIFIED VESSEL OR LESION TYPE: ICD-10-CM

## 2018-05-16 DIAGNOSIS — E78.5 HYPERLIPIDEMIA, UNSPECIFIED HYPERLIPIDEMIA TYPE: ICD-10-CM

## 2018-05-16 DIAGNOSIS — I25.10 CORONARY ARTERY DISEASE INVOLVING NATIVE HEART, ANGINA PRESENCE UNSPECIFIED, UNSPECIFIED VESSEL OR LESION TYPE: ICD-10-CM

## 2018-05-16 LAB
ABSOLUTE EOS #: 0.1 K/UL (ref 0–0.4)
ABSOLUTE IMMATURE GRANULOCYTE: ABNORMAL K/UL (ref 0–0.3)
ABSOLUTE LYMPH #: 0.9 K/UL (ref 1–4.8)
ABSOLUTE MONO #: 0.6 K/UL (ref 0–1)
ALBUMIN SERPL-MCNC: 3.8 G/DL (ref 3.5–5.2)
ALBUMIN/GLOBULIN RATIO: ABNORMAL (ref 1–2.5)
ALP BLD-CCNC: 84 U/L (ref 40–129)
ALT SERPL-CCNC: 17 U/L (ref 5–41)
ANION GAP SERPL CALCULATED.3IONS-SCNC: 12 MMOL/L (ref 9–17)
AST SERPL-CCNC: 19 U/L
BASOPHILS # BLD: 0 % (ref 0–2)
BASOPHILS ABSOLUTE: 0 K/UL (ref 0–0.2)
BILIRUB SERPL-MCNC: 0.38 MG/DL (ref 0.3–1.2)
BUN BLDV-MCNC: 18 MG/DL (ref 8–23)
BUN/CREAT BLD: 19 (ref 9–20)
CALCIUM SERPL-MCNC: 9 MG/DL (ref 8.6–10.4)
CHLORIDE BLD-SCNC: 100 MMOL/L (ref 98–107)
CHOLESTEROL/HDL RATIO: 5.8
CHOLESTEROL: 254 MG/DL
CO2: 26 MMOL/L (ref 20–31)
CREAT SERPL-MCNC: 0.96 MG/DL (ref 0.7–1.2)
DIFFERENTIAL TYPE: YES
EKG ATRIAL RATE: 64 BPM
EKG P AXIS: 48 DEGREES
EKG P-R INTERVAL: 190 MS
EKG Q-T INTERVAL: 398 MS
EKG QRS DURATION: 88 MS
EKG QTC CALCULATION (BAZETT): 410 MS
EKG R AXIS: -6 DEGREES
EKG T AXIS: 42 DEGREES
EKG VENTRICULAR RATE: 64 BPM
EOSINOPHILS RELATIVE PERCENT: 2 % (ref 0–5)
GFR AFRICAN AMERICAN: >60 ML/MIN
GFR NON-AFRICAN AMERICAN: >60 ML/MIN
GFR SERPL CREATININE-BSD FRML MDRD: ABNORMAL ML/MIN/{1.73_M2}
GFR SERPL CREATININE-BSD FRML MDRD: ABNORMAL ML/MIN/{1.73_M2}
GLUCOSE BLD-MCNC: 138 MG/DL (ref 70–99)
HCT VFR BLD CALC: 40.6 % (ref 41–53)
HDLC SERPL-MCNC: 44 MG/DL
HEMOGLOBIN: 13.8 G/DL (ref 13.5–17.5)
IMMATURE GRANULOCYTES: ABNORMAL %
LDL CHOLESTEROL: 174 MG/DL (ref 0–130)
LYMPHOCYTES # BLD: 15 % (ref 13–44)
MAGNESIUM: 2.2 MG/DL (ref 1.6–2.6)
MCH RBC QN AUTO: 28.8 PG (ref 26–34)
MCHC RBC AUTO-ENTMCNC: 33.9 G/DL (ref 31–37)
MCV RBC AUTO: 84.8 FL (ref 80–100)
MONOCYTES # BLD: 9 % (ref 5–9)
NRBC AUTOMATED: ABNORMAL PER 100 WBC
PATIENT FASTING?: YES
PDW BLD-RTO: 14.5 % (ref 12.1–15.2)
PLATELET # BLD: 312 K/UL (ref 140–450)
PLATELET ESTIMATE: ABNORMAL
PMV BLD AUTO: ABNORMAL FL (ref 6–12)
POTASSIUM SERPL-SCNC: 4 MMOL/L (ref 3.7–5.3)
RBC # BLD: 4.78 M/UL (ref 4.5–5.9)
RBC # BLD: ABNORMAL 10*6/UL
SEG NEUTROPHILS: 74 % (ref 39–75)
SEGMENTED NEUTROPHILS ABSOLUTE COUNT: 4.5 K/UL (ref 2.1–6.5)
SODIUM BLD-SCNC: 138 MMOL/L (ref 135–144)
TOTAL PROTEIN: 6.5 G/DL (ref 6.4–8.3)
TRIGL SERPL-MCNC: 178 MG/DL
TSH SERPL DL<=0.05 MIU/L-ACNC: 1.76 MIU/L (ref 0.3–5)
VITAMIN D 25-HYDROXY: 25.9 NG/ML (ref 30–100)
VLDLC SERPL CALC-MCNC: ABNORMAL MG/DL (ref 1–30)
WBC # BLD: 6.1 K/UL (ref 3.5–11)
WBC # BLD: ABNORMAL 10*3/UL

## 2018-05-16 PROCEDURE — 71046 X-RAY EXAM CHEST 2 VIEWS: CPT

## 2018-05-16 PROCEDURE — 80053 COMPREHEN METABOLIC PANEL: CPT

## 2018-05-16 PROCEDURE — 85025 COMPLETE CBC W/AUTO DIFF WBC: CPT

## 2018-05-16 PROCEDURE — 80061 LIPID PANEL: CPT

## 2018-05-16 PROCEDURE — 83735 ASSAY OF MAGNESIUM: CPT

## 2018-05-16 PROCEDURE — 84443 ASSAY THYROID STIM HORMONE: CPT

## 2018-05-16 PROCEDURE — 82306 VITAMIN D 25 HYDROXY: CPT

## 2018-05-16 PROCEDURE — 93005 ELECTROCARDIOGRAM TRACING: CPT

## 2018-05-16 PROCEDURE — 36415 COLL VENOUS BLD VENIPUNCTURE: CPT

## 2018-05-22 ENCOUNTER — OFFICE VISIT (OUTPATIENT)
Dept: CARDIOLOGY CLINIC | Age: 83
End: 2018-05-22
Payer: MEDICARE

## 2018-05-22 VITALS
HEART RATE: 90 BPM | BODY MASS INDEX: 25.39 KG/M2 | OXYGEN SATURATION: 96 % | DIASTOLIC BLOOD PRESSURE: 88 MMHG | SYSTOLIC BLOOD PRESSURE: 140 MMHG | WEIGHT: 167 LBS

## 2018-05-22 DIAGNOSIS — I25.10 CORONARY ARTERY DISEASE INVOLVING NATIVE HEART, ANGINA PRESENCE UNSPECIFIED, UNSPECIFIED VESSEL OR LESION TYPE: ICD-10-CM

## 2018-05-22 DIAGNOSIS — I10 ESSENTIAL HYPERTENSION: ICD-10-CM

## 2018-05-22 DIAGNOSIS — E78.5 HYPERLIPIDEMIA, UNSPECIFIED HYPERLIPIDEMIA TYPE: ICD-10-CM

## 2018-05-22 DIAGNOSIS — R73.09 ELEVATED GLUCOSE: Primary | ICD-10-CM

## 2018-05-22 DIAGNOSIS — E55.9 VITAMIN D DEFICIENCY DISEASE: ICD-10-CM

## 2018-05-22 PROCEDURE — G8427 DOCREV CUR MEDS BY ELIG CLIN: HCPCS | Performed by: INTERNAL MEDICINE

## 2018-05-22 PROCEDURE — G8419 CALC BMI OUT NRM PARAM NOF/U: HCPCS | Performed by: INTERNAL MEDICINE

## 2018-05-22 PROCEDURE — 1123F ACP DISCUSS/DSCN MKR DOCD: CPT | Performed by: INTERNAL MEDICINE

## 2018-05-22 PROCEDURE — 1036F TOBACCO NON-USER: CPT | Performed by: INTERNAL MEDICINE

## 2018-05-22 PROCEDURE — 4040F PNEUMOC VAC/ADMIN/RCVD: CPT | Performed by: INTERNAL MEDICINE

## 2018-05-22 PROCEDURE — 99214 OFFICE O/P EST MOD 30 MIN: CPT | Performed by: INTERNAL MEDICINE

## 2018-05-22 PROCEDURE — G8598 ASA/ANTIPLAT THER USED: HCPCS | Performed by: INTERNAL MEDICINE

## 2018-05-29 ENCOUNTER — HOSPITAL ENCOUNTER (OUTPATIENT)
Dept: GENERAL RADIOLOGY | Age: 83
Discharge: HOME | End: 2018-05-31
Payer: MEDICARE

## 2018-05-29 ENCOUNTER — HOSPITAL ENCOUNTER (OUTPATIENT)
Age: 83
Discharge: HOME | End: 2018-05-31
Payer: MEDICARE

## 2018-05-29 DIAGNOSIS — N20.0 RENAL STONE: ICD-10-CM

## 2018-05-29 PROCEDURE — 74018 RADEX ABDOMEN 1 VIEW: CPT

## 2018-05-31 ENCOUNTER — OFFICE VISIT (OUTPATIENT)
Dept: UROLOGY | Age: 83
End: 2018-05-31
Payer: MEDICARE

## 2018-05-31 ENCOUNTER — HOSPITAL ENCOUNTER (OUTPATIENT)
Age: 83
Setting detail: SPECIMEN
Discharge: HOME | End: 2018-05-31
Payer: MEDICARE

## 2018-05-31 VITALS
BODY MASS INDEX: 23.91 KG/M2 | SYSTOLIC BLOOD PRESSURE: 110 MMHG | HEIGHT: 70 IN | DIASTOLIC BLOOD PRESSURE: 64 MMHG | WEIGHT: 167 LBS

## 2018-05-31 DIAGNOSIS — R35.1 NOCTURIA: ICD-10-CM

## 2018-05-31 DIAGNOSIS — N39.43 POST-VOID DRIBBLING: ICD-10-CM

## 2018-05-31 DIAGNOSIS — N40.1 BPH WITH OBSTRUCTION/LOWER URINARY TRACT SYMPTOMS: ICD-10-CM

## 2018-05-31 DIAGNOSIS — N13.8 BPH WITH OBSTRUCTION/LOWER URINARY TRACT SYMPTOMS: ICD-10-CM

## 2018-05-31 DIAGNOSIS — N13.2 URETERAL STONE WITH HYDRONEPHROSIS: Primary | ICD-10-CM

## 2018-05-31 LAB
-: ABNORMAL
AMORPHOUS: ABNORMAL
BACTERIA: ABNORMAL
BILIRUBIN URINE: ABNORMAL
CASTS UA: ABNORMAL /LPF
COLOR: YELLOW
COMMENT UA: ABNORMAL
CRYSTALS, UA: ABNORMAL /HPF
EPITHELIAL CELLS UA: ABNORMAL /HPF
GLUCOSE URINE: NEGATIVE
KETONES, URINE: ABNORMAL
LEUKOCYTE ESTERASE, URINE: ABNORMAL
MUCUS: ABNORMAL
NITRITE, URINE: NEGATIVE
OTHER OBSERVATIONS UA: ABNORMAL
PH UA: 5 (ref 5–8)
PROTEIN UA: ABNORMAL
RBC UA: ABNORMAL /HPF (ref 0–2)
RENAL EPITHELIAL, UA: ABNORMAL /HPF
SPECIFIC GRAVITY UA: 1.02 (ref 1–1.03)
TRICHOMONAS: ABNORMAL
TURBIDITY: CLEAR
URINE HGB: NEGATIVE
UROBILINOGEN, URINE: NORMAL
WBC UA: ABNORMAL /HPF
YEAST: ABNORMAL

## 2018-05-31 PROCEDURE — 99214 OFFICE O/P EST MOD 30 MIN: CPT | Performed by: NURSE PRACTITIONER

## 2018-05-31 PROCEDURE — G8420 CALC BMI NORM PARAMETERS: HCPCS | Performed by: NURSE PRACTITIONER

## 2018-05-31 PROCEDURE — 87086 URINE CULTURE/COLONY COUNT: CPT

## 2018-05-31 PROCEDURE — 1036F TOBACCO NON-USER: CPT | Performed by: NURSE PRACTITIONER

## 2018-05-31 PROCEDURE — 4040F PNEUMOC VAC/ADMIN/RCVD: CPT | Performed by: NURSE PRACTITIONER

## 2018-05-31 PROCEDURE — G8598 ASA/ANTIPLAT THER USED: HCPCS | Performed by: NURSE PRACTITIONER

## 2018-05-31 PROCEDURE — 1123F ACP DISCUSS/DSCN MKR DOCD: CPT | Performed by: NURSE PRACTITIONER

## 2018-05-31 PROCEDURE — 81001 URINALYSIS AUTO W/SCOPE: CPT

## 2018-05-31 PROCEDURE — G8427 DOCREV CUR MEDS BY ELIG CLIN: HCPCS | Performed by: NURSE PRACTITIONER

## 2018-05-31 ASSESSMENT — ENCOUNTER SYMPTOMS
EYE REDNESS: 0
NAUSEA: 0
BACK PAIN: 0
CONSTIPATION: 0
VOMITING: 0
COLOR CHANGE: 0
WHEEZING: 0
COUGH: 0
SHORTNESS OF BREATH: 0
EYE PAIN: 0
ABDOMINAL PAIN: 0

## 2018-06-01 LAB
CULTURE: NO GROWTH
CULTURE: NORMAL
Lab: NORMAL
SPECIMEN DESCRIPTION: NORMAL
SPECIMEN DESCRIPTION: NORMAL
STATUS: NORMAL

## 2018-10-01 ENCOUNTER — APPOINTMENT (OUTPATIENT)
Dept: CT IMAGING | Age: 83
End: 2018-10-01
Payer: MEDICARE

## 2018-10-01 ENCOUNTER — APPOINTMENT (OUTPATIENT)
Dept: GENERAL RADIOLOGY | Age: 83
End: 2018-10-01
Payer: MEDICARE

## 2018-10-01 ENCOUNTER — HOSPITAL ENCOUNTER (OUTPATIENT)
Age: 83
Setting detail: OBSERVATION
Discharge: ANOTHER HOSPITAL | End: 2018-10-02
Attending: FAMILY MEDICINE | Admitting: INTERNAL MEDICINE
Payer: MEDICARE

## 2018-10-01 DIAGNOSIS — R07.9 CHEST PAIN IN ADULT: Primary | ICD-10-CM

## 2018-10-01 LAB
ABSOLUTE EOS #: 0.2 K/UL (ref 0–0.4)
ABSOLUTE IMMATURE GRANULOCYTE: ABNORMAL K/UL (ref 0–0.3)
ABSOLUTE LYMPH #: 1.5 K/UL (ref 1–4.8)
ABSOLUTE MONO #: 1 K/UL (ref 0–1)
ANION GAP SERPL CALCULATED.3IONS-SCNC: 14 MMOL/L (ref 9–17)
BASOPHILS # BLD: 0 % (ref 0–2)
BASOPHILS ABSOLUTE: 0 K/UL (ref 0–0.2)
BUN BLDV-MCNC: 16 MG/DL (ref 8–23)
BUN/CREAT BLD: 12 (ref 9–20)
CALCIUM SERPL-MCNC: 9.8 MG/DL (ref 8.6–10.4)
CHLORIDE BLD-SCNC: 104 MMOL/L (ref 98–107)
CO2: 21 MMOL/L (ref 20–31)
CREAT SERPL-MCNC: 1.34 MG/DL (ref 0.7–1.2)
D-DIMER QUANTITATIVE: 0.59 MG/L FEU (ref 0–0.5)
DIFFERENTIAL TYPE: YES
EOSINOPHILS RELATIVE PERCENT: 2 % (ref 0–5)
GFR AFRICAN AMERICAN: >60 ML/MIN
GFR NON-AFRICAN AMERICAN: 51 ML/MIN
GFR SERPL CREATININE-BSD FRML MDRD: ABNORMAL ML/MIN/{1.73_M2}
GFR SERPL CREATININE-BSD FRML MDRD: ABNORMAL ML/MIN/{1.73_M2}
GLUCOSE BLD-MCNC: 102 MG/DL (ref 65–99)
GLUCOSE BLD-MCNC: 146 MG/DL (ref 70–99)
HCT VFR BLD CALC: 43.2 % (ref 41–53)
HEMOGLOBIN: 14.5 G/DL (ref 13.5–17.5)
IMMATURE GRANULOCYTES: ABNORMAL %
INR BLD: 1.1
LACTIC ACID, WHOLE BLOOD: NORMAL MMOL/L (ref 0.7–2.1)
LACTIC ACID: 1.1 MMOL/L (ref 0.5–2.2)
LYMPHOCYTES # BLD: 17 % (ref 13–44)
MCH RBC QN AUTO: 28.1 PG (ref 26–34)
MCHC RBC AUTO-ENTMCNC: 33.6 G/DL (ref 31–37)
MCV RBC AUTO: 83.6 FL (ref 80–100)
MONOCYTES # BLD: 12 % (ref 5–9)
NRBC AUTOMATED: ABNORMAL PER 100 WBC
PARTIAL THROMBOPLASTIN TIME: 25.4 SEC (ref 21–33)
PDW BLD-RTO: 14.6 % (ref 12.1–15.2)
PLATELET # BLD: 331 K/UL (ref 140–450)
PLATELET ESTIMATE: ABNORMAL
PMV BLD AUTO: ABNORMAL FL (ref 6–12)
POTASSIUM SERPL-SCNC: 3.9 MMOL/L (ref 3.7–5.3)
PROTHROMBIN TIME: 10.3 SEC (ref 9–11.6)
RBC # BLD: 5.17 M/UL (ref 4.5–5.9)
RBC # BLD: ABNORMAL 10*6/UL
SEG NEUTROPHILS: 69 % (ref 39–75)
SEGMENTED NEUTROPHILS ABSOLUTE COUNT: 6.2 K/UL (ref 2.1–6.5)
SODIUM BLD-SCNC: 139 MMOL/L (ref 135–144)
TROPONIN INTERP: NORMAL
TROPONIN T: <0.03 NG/ML
WBC # BLD: 8.9 K/UL (ref 3.5–11)
WBC # BLD: ABNORMAL 10*3/UL

## 2018-10-01 PROCEDURE — 80048 BASIC METABOLIC PNL TOTAL CA: CPT

## 2018-10-01 PROCEDURE — 84484 ASSAY OF TROPONIN QUANT: CPT

## 2018-10-01 PROCEDURE — 2580000003 HC RX 258: Performed by: FAMILY MEDICINE

## 2018-10-01 PROCEDURE — 6360000004 HC RX CONTRAST MEDICATION: Performed by: FAMILY MEDICINE

## 2018-10-01 PROCEDURE — 82947 ASSAY GLUCOSE BLOOD QUANT: CPT

## 2018-10-01 PROCEDURE — 6370000000 HC RX 637 (ALT 250 FOR IP): Performed by: INTERNAL MEDICINE

## 2018-10-01 PROCEDURE — 2500000003 HC RX 250 WO HCPCS: Performed by: FAMILY MEDICINE

## 2018-10-01 PROCEDURE — 6370000000 HC RX 637 (ALT 250 FOR IP): Performed by: FAMILY MEDICINE

## 2018-10-01 PROCEDURE — G0378 HOSPITAL OBSERVATION PER HR: HCPCS

## 2018-10-01 PROCEDURE — 85730 THROMBOPLASTIN TIME PARTIAL: CPT

## 2018-10-01 PROCEDURE — 93005 ELECTROCARDIOGRAM TRACING: CPT

## 2018-10-01 PROCEDURE — 71045 X-RAY EXAM CHEST 1 VIEW: CPT

## 2018-10-01 PROCEDURE — 96374 THER/PROPH/DIAG INJ IV PUSH: CPT

## 2018-10-01 PROCEDURE — 94760 N-INVAS EAR/PLS OXIMETRY 1: CPT

## 2018-10-01 PROCEDURE — 83605 ASSAY OF LACTIC ACID: CPT

## 2018-10-01 PROCEDURE — 85379 FIBRIN DEGRADATION QUANT: CPT

## 2018-10-01 PROCEDURE — 99285 EMERGENCY DEPT VISIT HI MDM: CPT

## 2018-10-01 PROCEDURE — 71275 CT ANGIOGRAPHY CHEST: CPT

## 2018-10-01 PROCEDURE — 85610 PROTHROMBIN TIME: CPT

## 2018-10-01 PROCEDURE — 36415 COLL VENOUS BLD VENIPUNCTURE: CPT

## 2018-10-01 PROCEDURE — 96375 TX/PRO/DX INJ NEW DRUG ADDON: CPT

## 2018-10-01 PROCEDURE — 85025 COMPLETE CBC W/AUTO DIFF WBC: CPT

## 2018-10-01 PROCEDURE — 6360000002 HC RX W HCPCS: Performed by: INTERNAL MEDICINE

## 2018-10-01 RX ORDER — SODIUM CHLORIDE 9 MG/ML
INJECTION, SOLUTION INTRAVENOUS CONTINUOUS
Status: DISCONTINUED | OUTPATIENT
Start: 2018-10-01 | End: 2018-10-02 | Stop reason: HOSPADM

## 2018-10-01 RX ORDER — TRAMADOL HYDROCHLORIDE 50 MG/1
50 TABLET ORAL 3 TIMES DAILY PRN
Status: DISCONTINUED | OUTPATIENT
Start: 2018-10-01 | End: 2018-10-02 | Stop reason: HOSPADM

## 2018-10-01 RX ORDER — TAMSULOSIN HYDROCHLORIDE 0.4 MG/1
0.4 CAPSULE ORAL EVERY EVENING
Status: DISCONTINUED | OUTPATIENT
Start: 2018-10-01 | End: 2018-10-02 | Stop reason: HOSPADM

## 2018-10-01 RX ORDER — DOCUSATE SODIUM 100 MG/1
100 CAPSULE, LIQUID FILLED ORAL 3 TIMES DAILY PRN
Status: DISCONTINUED | OUTPATIENT
Start: 2018-10-01 | End: 2018-10-02 | Stop reason: HOSPADM

## 2018-10-01 RX ORDER — ISOSORBIDE MONONITRATE 30 MG/1
15 TABLET, EXTENDED RELEASE ORAL DAILY
Status: DISCONTINUED | OUTPATIENT
Start: 2018-10-02 | End: 2018-10-02

## 2018-10-01 RX ORDER — SODIUM CHLORIDE 9 MG/ML
INJECTION, SOLUTION INTRAVENOUS CONTINUOUS
Status: DISCONTINUED | OUTPATIENT
Start: 2018-10-01 | End: 2018-10-01

## 2018-10-01 RX ORDER — CLOPIDOGREL BISULFATE 75 MG/1
75 TABLET ORAL DAILY
Status: DISCONTINUED | OUTPATIENT
Start: 2018-10-02 | End: 2018-10-02 | Stop reason: HOSPADM

## 2018-10-01 RX ORDER — PANTOPRAZOLE SODIUM 40 MG/1
40 TABLET, DELAYED RELEASE ORAL 2 TIMES DAILY
Status: DISCONTINUED | OUTPATIENT
Start: 2018-10-01 | End: 2018-10-02 | Stop reason: HOSPADM

## 2018-10-01 RX ORDER — SODIUM CHLORIDE 0.9 % (FLUSH) 0.9 %
10 SYRINGE (ML) INJECTION EVERY 12 HOURS SCHEDULED
Status: DISCONTINUED | OUTPATIENT
Start: 2018-10-01 | End: 2018-10-02 | Stop reason: HOSPADM

## 2018-10-01 RX ORDER — PROPRANOLOL HYDROCHLORIDE 10 MG/1
60 TABLET ORAL 3 TIMES DAILY
Status: DISCONTINUED | OUTPATIENT
Start: 2018-10-01 | End: 2018-10-02 | Stop reason: HOSPADM

## 2018-10-01 RX ORDER — ASPIRIN 325 MG
325 TABLET ORAL DAILY
Status: DISCONTINUED | OUTPATIENT
Start: 2018-10-01 | End: 2018-10-02 | Stop reason: HOSPADM

## 2018-10-01 RX ORDER — CLONAZEPAM 0.5 MG/1
0.5 TABLET ORAL 2 TIMES DAILY
Status: DISCONTINUED | OUTPATIENT
Start: 2018-10-01 | End: 2018-10-02 | Stop reason: HOSPADM

## 2018-10-01 RX ORDER — NITROGLYCERIN 0.4 MG/1
0.4 TABLET SUBLINGUAL EVERY 5 MIN PRN
Status: DISCONTINUED | OUTPATIENT
Start: 2018-10-01 | End: 2018-10-02 | Stop reason: HOSPADM

## 2018-10-01 RX ORDER — ONDANSETRON 2 MG/ML
4 INJECTION INTRAMUSCULAR; INTRAVENOUS EVERY 6 HOURS PRN
Status: DISCONTINUED | OUTPATIENT
Start: 2018-10-01 | End: 2018-10-02 | Stop reason: HOSPADM

## 2018-10-01 RX ORDER — VENLAFAXINE 25 MG/1
100 TABLET ORAL 2 TIMES DAILY
Status: DISCONTINUED | OUTPATIENT
Start: 2018-10-01 | End: 2018-10-02 | Stop reason: HOSPADM

## 2018-10-01 RX ORDER — SODIUM CHLORIDE 0.9 % (FLUSH) 0.9 %
10 SYRINGE (ML) INJECTION PRN
Status: DISCONTINUED | OUTPATIENT
Start: 2018-10-01 | End: 2018-10-02 | Stop reason: HOSPADM

## 2018-10-01 RX ORDER — ASPIRIN 81 MG/1
243 TABLET, CHEWABLE ORAL ONCE
Status: COMPLETED | OUTPATIENT
Start: 2018-10-01 | End: 2018-10-01

## 2018-10-01 RX ORDER — ATORVASTATIN CALCIUM 20 MG/1
80 TABLET, FILM COATED ORAL DAILY
Status: DISCONTINUED | OUTPATIENT
Start: 2018-10-01 | End: 2018-10-02 | Stop reason: HOSPADM

## 2018-10-01 RX ORDER — MORPHINE SULFATE 2 MG/ML
2 INJECTION, SOLUTION INTRAMUSCULAR; INTRAVENOUS
Status: DISCONTINUED | OUTPATIENT
Start: 2018-10-01 | End: 2018-10-02 | Stop reason: HOSPADM

## 2018-10-01 RX ADMIN — Medication 0.4 MG: at 22:51

## 2018-10-01 RX ADMIN — ATORVASTATIN CALCIUM 80 MG: 20 TABLET, FILM COATED ORAL at 22:07

## 2018-10-01 RX ADMIN — MORPHINE SULFATE 2 MG: 2 INJECTION, SOLUTION INTRAMUSCULAR; INTRAVENOUS at 14:33

## 2018-10-01 RX ADMIN — IOPAMIDOL 100 ML: 755 INJECTION, SOLUTION INTRAVENOUS at 15:53

## 2018-10-01 RX ADMIN — CLONAZEPAM 0.5 MG: 0.5 TABLET ORAL at 21:59

## 2018-10-01 RX ADMIN — GABAPENTIN 400 MG: 300 CAPSULE ORAL at 22:14

## 2018-10-01 RX ADMIN — PANTOPRAZOLE SODIUM 40 MG: 40 TABLET, DELAYED RELEASE ORAL at 22:14

## 2018-10-01 RX ADMIN — TAMSULOSIN HYDROCHLORIDE 0.4 MG: 0.4 CAPSULE ORAL at 22:06

## 2018-10-01 RX ADMIN — SODIUM CHLORIDE 100 ML/HR: 9 INJECTION, SOLUTION INTRAVENOUS at 14:34

## 2018-10-01 RX ADMIN — VENLAFAXINE 100 MG: 25 TABLET ORAL at 22:03

## 2018-10-01 RX ADMIN — ASPIRIN 81 MG 243 MG: 81 TABLET ORAL at 14:33

## 2018-10-01 RX ADMIN — HYDROCORTISONE SODIUM SUCCINATE 100 MG: 100 INJECTION, POWDER, FOR SOLUTION INTRAMUSCULAR; INTRAVENOUS at 22:15

## 2018-10-01 ASSESSMENT — PAIN DESCRIPTION - LOCATION
LOCATION: CHEST

## 2018-10-01 ASSESSMENT — PAIN DESCRIPTION - ORIENTATION
ORIENTATION: MID

## 2018-10-01 ASSESSMENT — PAIN DESCRIPTION - DESCRIPTORS
DESCRIPTORS: PRESSURE
DESCRIPTORS: CONSTANT
DESCRIPTORS: PENETRATING

## 2018-10-01 ASSESSMENT — PAIN DESCRIPTION - PROGRESSION
CLINICAL_PROGRESSION: GRADUALLY WORSENING
CLINICAL_PROGRESSION: NOT CHANGED

## 2018-10-01 ASSESSMENT — PAIN DESCRIPTION - ONSET
ONSET: SUDDEN
ONSET: ON-GOING
ONSET: ON-GOING

## 2018-10-01 ASSESSMENT — PAIN SCALES - GENERAL
PAINLEVEL_OUTOF10: 0
PAINLEVEL_OUTOF10: 8
PAINLEVEL_OUTOF10: 3
PAINLEVEL_OUTOF10: 0
PAINLEVEL_OUTOF10: 2
PAINLEVEL_OUTOF10: 0

## 2018-10-01 ASSESSMENT — PAIN DESCRIPTION - PAIN TYPE
TYPE: ACUTE PAIN
TYPE: ACUTE PAIN

## 2018-10-01 ASSESSMENT — PAIN DESCRIPTION - DIRECTION: RADIATING_TOWARDS: BACK

## 2018-10-01 ASSESSMENT — PAIN DESCRIPTION - FREQUENCY
FREQUENCY: CONTINUOUS
FREQUENCY: CONTINUOUS
FREQUENCY: INTERMITTENT

## 2018-10-01 NOTE — ED PROVIDER NOTES
eMERGENCY dEPARTMENT eNCOUnter        [address] [facility]    Chief Complaint   Patient presents with    Chest Pain     pt was cutting the grass with a push mower about 30 minutes ago       DI Villavicencio is a 82 y.o. male who presents With chest pain that started 30 minutes before coming to ER. The patient was use to smoke. he has severe chest pain that also went to the back. he took 2 nitroglycerin because this was similar to the chest pain he had with prior heart attacks. he is brought to ER by his wife. REVIEW OF SYSTEMS    All body systems reviewed with pertinent positive and negative findings mentioned in the history of present illness. PAST MEDICAL HISTORY    Past Medical History:   Diagnosis Date    Anginal pain ([address]     CAD (coronary artery disease)     Diverticula of colon     Hyperlipidemia     Hypertension     Kidney stone        SURGICAL HISTORY    Past Surgical History:   Procedure Laterality Date    ABDOMEN SURGERY      partial gastrectomy    ANGIOPLASTY  06/29/2016    Dr. Miriam Romeo @ [facility] 12/24/14    Dr. Lelo Ford -2 vessel CAD-    CARDIAC CATHETERIZATION  5/4/2007    Dr Nancy Milton: 1. Normal left ventricular systolic function with an estimated ejection fraction of 70%. 2. Elevated left ventricular end-diastolic pressure following angiographic dye load. 3. Double-vessel coronary artery disease with angiographic edivence of restenosis within the intervened-upon segment in the right coronary artery and moderately severe disease just proximal     CARDIAC CATHETERIZATION  5/4/07 con't    to the stented segment in the mid left anterior descending.  CARDIAC CATHETERIZATION  6/12/2008    Dr Blanco: 1. Moderate coronary artery disease, as described, with patent stents with evidence of mild at most in-stent restenosis of the right coronary artery. 2. Normal left ventricular systolic function.     CARDIAC SURGERY      13 stents,

## 2018-10-02 VITALS
HEIGHT: 70 IN | OXYGEN SATURATION: 97 % | BODY MASS INDEX: 23.62 KG/M2 | SYSTOLIC BLOOD PRESSURE: 140 MMHG | DIASTOLIC BLOOD PRESSURE: 75 MMHG | TEMPERATURE: 97.9 F | WEIGHT: 165 LBS | RESPIRATION RATE: 18 BRPM | HEART RATE: 77 BPM

## 2018-10-02 LAB
ANION GAP SERPL CALCULATED.3IONS-SCNC: 8 MMOL/L (ref 9–17)
BUN BLDV-MCNC: 16 MG/DL (ref 8–23)
BUN/CREAT BLD: 17 (ref 9–20)
CALCIUM SERPL-MCNC: 9 MG/DL (ref 8.6–10.4)
CHLORIDE BLD-SCNC: 108 MMOL/L (ref 98–107)
CO2: 23 MMOL/L (ref 20–31)
CREAT SERPL-MCNC: 0.96 MG/DL (ref 0.7–1.2)
EKG ATRIAL RATE: 102 BPM
EKG ATRIAL RATE: 54 BPM
EKG P AXIS: 56 DEGREES
EKG P AXIS: 62 DEGREES
EKG P-R INTERVAL: 186 MS
EKG P-R INTERVAL: 222 MS
EKG Q-T INTERVAL: 344 MS
EKG Q-T INTERVAL: 444 MS
EKG QRS DURATION: 74 MS
EKG QRS DURATION: 80 MS
EKG QTC CALCULATION (BAZETT): 421 MS
EKG QTC CALCULATION (BAZETT): 448 MS
EKG R AXIS: -24 DEGREES
EKG R AXIS: -42 DEGREES
EKG T AXIS: 3 DEGREES
EKG T AXIS: 45 DEGREES
EKG VENTRICULAR RATE: 102 BPM
EKG VENTRICULAR RATE: 54 BPM
GFR AFRICAN AMERICAN: >60 ML/MIN
GFR NON-AFRICAN AMERICAN: >60 ML/MIN
GFR SERPL CREATININE-BSD FRML MDRD: ABNORMAL ML/MIN/{1.73_M2}
GFR SERPL CREATININE-BSD FRML MDRD: ABNORMAL ML/MIN/{1.73_M2}
GLUCOSE BLD-MCNC: 141 MG/DL (ref 70–99)
POTASSIUM SERPL-SCNC: 4.3 MMOL/L (ref 3.7–5.3)
SODIUM BLD-SCNC: 139 MMOL/L (ref 135–144)
TROPONIN INTERP: NORMAL
TROPONIN T: <0.03 NG/ML

## 2018-10-02 PROCEDURE — 99214 OFFICE O/P EST MOD 30 MIN: CPT | Performed by: INTERNAL MEDICINE

## 2018-10-02 PROCEDURE — 94760 N-INVAS EAR/PLS OXIMETRY 1: CPT

## 2018-10-02 PROCEDURE — 96376 TX/PRO/DX INJ SAME DRUG ADON: CPT

## 2018-10-02 PROCEDURE — 2580000003 HC RX 258: Performed by: INTERNAL MEDICINE

## 2018-10-02 PROCEDURE — G0378 HOSPITAL OBSERVATION PER HR: HCPCS

## 2018-10-02 PROCEDURE — 84484 ASSAY OF TROPONIN QUANT: CPT

## 2018-10-02 PROCEDURE — 6370000000 HC RX 637 (ALT 250 FOR IP): Performed by: INTERNAL MEDICINE

## 2018-10-02 PROCEDURE — 6360000002 HC RX W HCPCS: Performed by: INTERNAL MEDICINE

## 2018-10-02 PROCEDURE — 36415 COLL VENOUS BLD VENIPUNCTURE: CPT

## 2018-10-02 PROCEDURE — 80048 BASIC METABOLIC PNL TOTAL CA: CPT

## 2018-10-02 PROCEDURE — 96372 THER/PROPH/DIAG INJ SC/IM: CPT

## 2018-10-02 RX ADMIN — CLOPIDOGREL BISULFATE 75 MG: 75 TABLET ORAL at 08:31

## 2018-10-02 RX ADMIN — ENOXAPARIN SODIUM 40 MG: 40 INJECTION SUBCUTANEOUS at 08:26

## 2018-10-02 RX ADMIN — TRAMADOL HYDROCHLORIDE 50 MG: 50 TABLET, FILM COATED ORAL at 09:07

## 2018-10-02 RX ADMIN — CLONAZEPAM 0.5 MG: 0.5 TABLET ORAL at 08:31

## 2018-10-02 RX ADMIN — ASPIRIN 325 MG ORAL TABLET 325 MG: 325 PILL ORAL at 08:26

## 2018-10-02 RX ADMIN — PANTOPRAZOLE SODIUM 40 MG: 40 TABLET, DELAYED RELEASE ORAL at 08:32

## 2018-10-02 RX ADMIN — NITROGLYCERIN 0.5 INCH: 20 OINTMENT TOPICAL at 08:27

## 2018-10-02 RX ADMIN — PROPRANOLOL HYDROCHLORIDE 60 MG: 10 TABLET ORAL at 08:32

## 2018-10-02 RX ADMIN — HYDROCORTISONE SODIUM SUCCINATE 100 MG: 100 INJECTION, POWDER, FOR SOLUTION INTRAMUSCULAR; INTRAVENOUS at 04:29

## 2018-10-02 RX ADMIN — CHOLECALCIFEROL TAB 50 MCG (2000 UNIT) 3000 UNITS: 50 TAB at 08:13

## 2018-10-02 RX ADMIN — SODIUM CHLORIDE: 9 INJECTION, SOLUTION INTRAVENOUS at 04:29

## 2018-10-02 ASSESSMENT — PAIN SCALES - GENERAL
PAINLEVEL_OUTOF10: 3
PAINLEVEL_OUTOF10: 0
PAINLEVEL_OUTOF10: 0

## 2018-10-02 NOTE — H&P
[facility]  History and Physical    Patient:  Andrzej Peña  MRN: [id number]    CHIEF COMPLAINT:  Chest pain. History Obtained From:  patient  PCP: Lon Kohler MD    HISTORY OF PRESENT ILLNESS:   The patient is a 82 y.o. male who presents to the ER via EMS with chest pain. According to Pipo Telles, he finished mowing on his riding mower and then switched to the push mower to do the trimming. While mowing, he developed a sudden onset of mid sternal chest pain, \"the worse I've ever had\". He sat down and the pain continued so he went into his home and took 2 nitro. After this he developed nausea and vomiting. The pain radiated to the center of his back and the left arm. He was SOB and diaphoretic. His wife called EMS and her was brought the the ER. In the ER he was given aspirin and he states his chest pain resolved. His troponin was negative x 2. BMP was normal other than a creatinine of 1.34. CBC was normal.  D-dimer was 0.59. CTA of the chest showed no PE or other acute changes. ECG showed sinus bradycardia with 1st degree AV block. With Martin's significant heart history, it was felt he should be observed on cardiac monitor with serial troponin levels and further evaluation by Dr. Brook Layne in Cardiology. Past Medical History:        Diagnosis Date    Anginal pain ([address]     CAD (coronary artery disease)     Diverticula of colon     Hyperlipidemia     Hypertension     Kidney stone        Past Surgical History:        Procedure Laterality Date    ABDOMEN SURGERY      partial gastrectomy    ANGIOPLASTY  06/29/2016    Dr. Brook Layne @ [facility] 12/24/14    Dr. Bledsoe Box -2 vessel CAD-    CARDIAC CATHETERIZATION  5/4/2007    Dr Reyna Bookerf: 1. Normal left ventricular systolic function with an estimated ejection fraction of 70%. 2. Elevated left ventricular end-diastolic pressure following angiographic dye load.  3. Double-vessel coronary naproxen (NAPROSYN) 375 MG tablet Take 1 tablet by mouth 2 times daily 4/8/18  Yes Fransico Claros MD   traMADol (ULTRAM) 50 MG tablet Take 50 mg by mouth 3 times daily as needed for Pain  Do not take in close timeframe with Clonazepam .   Yes Historical Provider, MD   gabapentin (NEURONTIN) 400 MG capsule Take 400 mg by mouth every evening   Yes Historical Provider, MD   atorvastatin (LIPITOR) 80 MG tablet Take 1 tablet by mouth daily 5/8/17  Yes Rose More MD   isosorbide mononitrate (IMDUR) 30 MG CR tablet Take 15 mg by mouth daily   Yes Historical Provider, MD   venlafaxine (EFFEXOR) 100 MG tablet Take 100 mg by mouth 2 times daily    Yes Historical Provider, MD   omeprazole (PRILOSEC) 20 MG capsule Take 20 mg by mouth 2 times daily   Yes Historical Provider, MD   vitamin D (CHOLECALCIFEROL) 1000 UNIT TABS tablet Take 3,000 Units by mouth daily    Yes Historical Provider, MD   propranolol (INDERAL) 60 MG tablet Take 60 mg by mouth 3 times daily   Yes Historical Provider, MD   clopidogrel (PLAVIX) 75 MG tablet Take 75 mg by mouth daily. Yes Historical Provider, MD   clonazePAM (KLONOPIN) 0.5 MG tablet Take 0.5 mg by mouth 2 times daily  . Yes Historical Provider, MD   nitroGLYCERIN (NITROSTAT) 0.4 MG SL tablet Place 0.4 mg under the tongue every 5 minutes as needed. Yes Historical Provider, MD   ondansetron (ZOFRAN) 4 MG tablet Take 1 tablet by mouth every 6 hours as needed for Nausea 4/10/18   REMY Melgar - CNP   aspirin 325 MG tablet Take 325 mg by mouth daily. Historical Provider, MD       Allergies:  Pcn [penicillins] and Ranolazine    Social History:   TOBACCO:   reports that he quit smoking about 51 years ago. He smoked 10.00 packs per day. He has never used smokeless tobacco.  ETOH:   reports that he does not drink alcohol.   OCCUPATION:      Family History:   Family History   Problem Relation Age of Onset    Heart Attack Father     Heart Attack Brother        REVIEW OF

## 2018-10-02 NOTE — PROGRESS NOTES
Access center called and transport arranged with Nico Steve. ETA 30 minutes. Pt belongings and home medications collected and returned to pt. Pt signs off on belonging sheet. Calls spouse regarding transfer to other facility. To go to room 3717 at [facility]. Accepting physician Dr. Yaya Rubio for heart cath.

## 2018-10-02 NOTE — CONSULTS
28 mm Promus stent. On 12/24/2014, a catheterization by Dr. Donna Viramontes showed widely patent stents  in all 3 major vessels. I saw him in 2016 and he had an increase in angina with an abnormal  Cardiolite stress test.  Therefore, I did a cardiac catheterization on  06/29/2016 at [facility] that showed 50% in-stent stenosis of the right  coronary artery with 50% disease in the proximal LAD and 80% disease in the  mid LAD within a previous stent. The circumflex was unremarkable. His EF  was 70% and I placed a 3.5 x 38 mm drug-eluting Resolute stent in his LAD  with a good end result. He does have chronic shortness of breath and does have a history of severe  COPD. He has had exacerbations requiring hospitalizations, with his last  hospitalization for COPD being in 12/2017. I saw him last on 05/22/2018 and at that time, he was doing well. He was  having some atypical chest discomfort, which did not appear to be cardiac  to me. His wife has been quite ill with having a hip replacement in February with  continued pain with her fairly inactive. Therefore, he has been the main  caretaker in his house for the last several months. Approximately 1 month ago, he began developing chest heaviness and  tightness with activity consistent with his previous angina. This has  progressed and occurring now at a much shorter distance. He did not  mention this to his wife and did not seek medical attention. He would take  a nitroglycerin when he attempts to do activity. He would notice when  running the sweeper that he was developing chest heaviness and tightness. Yesterday, he attempted to mow the lawn. When he was mowing, he developed  more severe chest discomfort with tightness and heaviness in his chest  associated with shortness of breath. He took 2 nitroglycerin with some  relief. However, he again developed chest discomfort later in the day  associated with diaphoresis when he was doing minimal activity. He,  therefore, came to the emergency room for a workup. His enzymes were  negative and EKG showed no acute changes and he was admitted for chest  pain. He does have 2 other types of chest pain. One is a sharp stabbing  discomfort that can occur at any time during the day or night, but that is  clearly noncardiac. He also occasionally has a pleuritic sharp discomfort. Yesterday, he did have a component of a sharp pleuritic discomfort, which  was different than his anginal equivalent that he had while mowing. This morning, he feels good and has had no further chest pain or chest  discomfort. He has had no PND or orthopnea. His energy level has been  much lower than last month and he has also had more shortness of breath  with activity in the last month consistent with progressive angina. CARDIAC RISK FACTORS:  Prior MI:  Negative. Known CAD:  Positive. PTCA:  Positive. Hypertension:  Positive. Hyperlipidemia:  Positive. Other Family Members:  Positive. Peripheral Vascular Disease:  Negative. Diabetes:  Negative. Smoking:  Negative. MEDICATIONS PRIOR TO ADMISSION:  He was on Flomax 0.4 mg daily, Naprosyn  375 mg b.i.d., Ultram 50 mg t.i.d., Neurontin 400 mg daily, Lipitor 80 mg  daily, Imdur 30 mg half a tablet daily, Effexor 100 mg b.i.d., Prilosec 20  mg b.i.d., vitamin D 3000 units daily, Inderal 60 mg t.i.d., Plavix 75 mg  daily, Klonopin 0.5 mg b.i.d., Zofran 4 mg p.r.n. and aspirin 5 grains  daily. PAST MEDICAL HISTORY:  1. Cardiac as above with a total of 14 stents. 2.  Arthroscopic surgery of the right knee on 07/31/2013. 3.  Hypertension. 4.  Hyperlipidemia with him on full-dose Lipitor. 5.  Peptic ulcer disease. 6.  Arthritis. 7.  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 8.  Bilateral eye surgery. 9.  Right knee replacement. 10.  COPD. 11.  Ureteral stones and has been followed by  _____.

## 2018-10-03 DIAGNOSIS — Z98.61 POST PTCA: Primary | ICD-10-CM

## 2018-10-18 ENCOUNTER — APPOINTMENT (OUTPATIENT)
Dept: GENERAL RADIOLOGY | Age: 83
End: 2018-10-18
Payer: MEDICARE

## 2018-10-18 ENCOUNTER — HOSPITAL ENCOUNTER (EMERGENCY)
Age: 83
Discharge: ANOTHER HOSPITAL | End: 2018-10-18
Attending: EMERGENCY MEDICINE
Payer: MEDICARE

## 2018-10-18 VITALS
WEIGHT: 159 LBS | OXYGEN SATURATION: 97 % | SYSTOLIC BLOOD PRESSURE: 112 MMHG | HEIGHT: 70 IN | BODY MASS INDEX: 22.76 KG/M2 | RESPIRATION RATE: 14 BRPM | HEART RATE: 95 BPM | TEMPERATURE: 99.2 F | DIASTOLIC BLOOD PRESSURE: 74 MMHG

## 2018-10-18 DIAGNOSIS — R07.9 CHEST PAIN, UNSPECIFIED TYPE: Primary | ICD-10-CM

## 2018-10-18 LAB
ABSOLUTE EOS #: 0.1 K/UL (ref 0–0.4)
ABSOLUTE IMMATURE GRANULOCYTE: NORMAL K/UL (ref 0–0.3)
ABSOLUTE LYMPH #: 1.2 K/UL (ref 1–4.8)
ABSOLUTE MONO #: 0.6 K/UL (ref 0–1)
ANION GAP SERPL CALCULATED.3IONS-SCNC: 16 MMOL/L (ref 9–17)
BASOPHILS # BLD: 0 % (ref 0–2)
BASOPHILS ABSOLUTE: 0 K/UL (ref 0–0.2)
BUN BLDV-MCNC: 16 MG/DL (ref 8–23)
BUN/CREAT BLD: 15 (ref 9–20)
CALCIUM SERPL-MCNC: 9.7 MG/DL (ref 8.6–10.4)
CHLORIDE BLD-SCNC: 101 MMOL/L (ref 98–107)
CO2: 19 MMOL/L (ref 20–31)
CREAT SERPL-MCNC: 1.04 MG/DL (ref 0.7–1.2)
DIFFERENTIAL TYPE: YES
EKG ATRIAL RATE: 96 BPM
EKG P AXIS: 63 DEGREES
EKG P-R INTERVAL: 168 MS
EKG Q-T INTERVAL: 346 MS
EKG QRS DURATION: 70 MS
EKG QTC CALCULATION (BAZETT): 437 MS
EKG R AXIS: -40 DEGREES
EKG T AXIS: 53 DEGREES
EKG VENTRICULAR RATE: 96 BPM
EOSINOPHILS RELATIVE PERCENT: 2 % (ref 0–5)
GFR AFRICAN AMERICAN: >60 ML/MIN
GFR NON-AFRICAN AMERICAN: >60 ML/MIN
GFR SERPL CREATININE-BSD FRML MDRD: ABNORMAL ML/MIN/{1.73_M2}
GFR SERPL CREATININE-BSD FRML MDRD: ABNORMAL ML/MIN/{1.73_M2}
GLUCOSE BLD-MCNC: 150 MG/DL (ref 70–99)
HCT VFR BLD CALC: 45.1 % (ref 41–53)
HEMOGLOBIN: 14.8 G/DL (ref 13.5–17.5)
IMMATURE GRANULOCYTES: NORMAL %
INR BLD: 1.1
LYMPHOCYTES # BLD: 16 % (ref 13–44)
MCH RBC QN AUTO: 27.6 PG (ref 26–34)
MCHC RBC AUTO-ENTMCNC: 32.8 G/DL (ref 31–37)
MCV RBC AUTO: 83.9 FL (ref 80–100)
MONOCYTES # BLD: 9 % (ref 5–9)
NRBC AUTOMATED: NORMAL PER 100 WBC
PARTIAL THROMBOPLASTIN TIME: 29.6 SEC (ref 21–33)
PDW BLD-RTO: 14.8 % (ref 12.1–15.2)
PLATELET # BLD: 370 K/UL (ref 140–450)
PLATELET ESTIMATE: NORMAL
PMV BLD AUTO: NORMAL FL (ref 6–12)
POTASSIUM SERPL-SCNC: 4.1 MMOL/L (ref 3.7–5.3)
PROTHROMBIN TIME: 10.5 SEC (ref 9–11.6)
RBC # BLD: 5.38 M/UL (ref 4.5–5.9)
RBC # BLD: NORMAL 10*6/UL
SEG NEUTROPHILS: 73 % (ref 39–75)
SEGMENTED NEUTROPHILS ABSOLUTE COUNT: 5.3 K/UL (ref 2.1–6.5)
SODIUM BLD-SCNC: 136 MMOL/L (ref 135–144)
TROPONIN INTERP: NORMAL
TROPONIN T: <0.03 NG/ML
WBC # BLD: 7.3 K/UL (ref 3.5–11)
WBC # BLD: NORMAL 10*3/UL

## 2018-10-18 PROCEDURE — 96375 TX/PRO/DX INJ NEW DRUG ADDON: CPT

## 2018-10-18 PROCEDURE — 85730 THROMBOPLASTIN TIME PARTIAL: CPT

## 2018-10-18 PROCEDURE — 6360000002 HC RX W HCPCS: Performed by: EMERGENCY MEDICINE

## 2018-10-18 PROCEDURE — 85610 PROTHROMBIN TIME: CPT

## 2018-10-18 PROCEDURE — 96374 THER/PROPH/DIAG INJ IV PUSH: CPT

## 2018-10-18 PROCEDURE — 84484 ASSAY OF TROPONIN QUANT: CPT

## 2018-10-18 PROCEDURE — 2580000003 HC RX 258: Performed by: EMERGENCY MEDICINE

## 2018-10-18 PROCEDURE — 80048 BASIC METABOLIC PNL TOTAL CA: CPT

## 2018-10-18 PROCEDURE — 93005 ELECTROCARDIOGRAM TRACING: CPT

## 2018-10-18 PROCEDURE — 6370000000 HC RX 637 (ALT 250 FOR IP): Performed by: EMERGENCY MEDICINE

## 2018-10-18 PROCEDURE — 71045 X-RAY EXAM CHEST 1 VIEW: CPT

## 2018-10-18 PROCEDURE — 99285 EMERGENCY DEPT VISIT HI MDM: CPT

## 2018-10-18 PROCEDURE — 96376 TX/PRO/DX INJ SAME DRUG ADON: CPT

## 2018-10-18 PROCEDURE — 85025 COMPLETE CBC W/AUTO DIFF WBC: CPT

## 2018-10-18 RX ORDER — ASPIRIN 81 MG/1
162 TABLET, CHEWABLE ORAL ONCE
Status: COMPLETED | OUTPATIENT
Start: 2018-10-18 | End: 2018-10-18

## 2018-10-18 RX ORDER — MORPHINE SULFATE 4 MG/ML
2 INJECTION, SOLUTION INTRAMUSCULAR; INTRAVENOUS ONCE
Status: COMPLETED | OUTPATIENT
Start: 2018-10-18 | End: 2018-10-18

## 2018-10-18 RX ORDER — NITROGLYCERIN 0.4 MG/1
0.4 TABLET SUBLINGUAL EVERY 5 MIN PRN
Status: DISCONTINUED | OUTPATIENT
Start: 2018-10-18 | End: 2018-10-18 | Stop reason: HOSPADM

## 2018-10-18 RX ORDER — SODIUM CHLORIDE 9 MG/ML
INJECTION, SOLUTION INTRAVENOUS CONTINUOUS
Status: DISCONTINUED | OUTPATIENT
Start: 2018-10-18 | End: 2018-10-18 | Stop reason: HOSPADM

## 2018-10-18 RX ORDER — ONDANSETRON 2 MG/ML
4 INJECTION INTRAMUSCULAR; INTRAVENOUS
Status: COMPLETED | OUTPATIENT
Start: 2018-10-18 | End: 2018-10-18

## 2018-10-18 RX ADMIN — ONDANSETRON 4 MG: 2 INJECTION INTRAMUSCULAR; INTRAVENOUS at 14:00

## 2018-10-18 RX ADMIN — SODIUM CHLORIDE 100 ML: 9 INJECTION, SOLUTION INTRAVENOUS at 11:04

## 2018-10-18 RX ADMIN — ASPIRIN 81 MG 162 MG: 81 TABLET ORAL at 10:52

## 2018-10-18 RX ADMIN — NITROGLYCERIN 0.4 MG: 0.4 TABLET SUBLINGUAL at 10:58

## 2018-10-18 RX ADMIN — NITROGLYCERIN 0.4 MG: 0.4 TABLET SUBLINGUAL at 10:52

## 2018-10-18 RX ADMIN — ONDANSETRON 4 MG: 2 INJECTION INTRAMUSCULAR; INTRAVENOUS at 10:53

## 2018-10-18 RX ADMIN — MORPHINE SULFATE 2 MG: 4 INJECTION, SOLUTION INTRAMUSCULAR; INTRAVENOUS at 10:56

## 2018-10-18 ASSESSMENT — ENCOUNTER SYMPTOMS
COUGH: 0
NAUSEA: 0
TROUBLE SWALLOWING: 0
SORE THROAT: 0
SHORTNESS OF BREATH: 1
EYE REDNESS: 0
EYE PAIN: 0
BACK PAIN: 0
ABDOMINAL PAIN: 0
COLOR CHANGE: 0
VOMITING: 0
DIARRHEA: 0

## 2018-10-18 ASSESSMENT — PAIN DESCRIPTION - PAIN TYPE: TYPE: ACUTE PAIN

## 2018-10-18 ASSESSMENT — PAIN SCALES - GENERAL
PAINLEVEL_OUTOF10: 0
PAINLEVEL_OUTOF10: 5
PAINLEVEL_OUTOF10: 6
PAINLEVEL_OUTOF10: 1
PAINLEVEL_OUTOF10: 0

## 2018-10-18 ASSESSMENT — PAIN DESCRIPTION - DESCRIPTORS: DESCRIPTORS: SHARP

## 2018-10-18 ASSESSMENT — PAIN DESCRIPTION - FREQUENCY: FREQUENCY: INTERMITTENT

## 2018-10-18 ASSESSMENT — PAIN DESCRIPTION - ONSET: ONSET: SUDDEN

## 2018-10-18 ASSESSMENT — PAIN DESCRIPTION - ORIENTATION: ORIENTATION: LEFT

## 2018-10-18 ASSESSMENT — PAIN DESCRIPTION - LOCATION: LOCATION: CHEST

## 2018-10-18 ASSESSMENT — PAIN DESCRIPTION - DIRECTION: RADIATING_TOWARDS: LEFT ARM

## 2018-10-18 NOTE — ED PROVIDER NOTES
angiographic disease progression involving the stented segment in the mid right coronary artery consistent with angiographic evidence of restenosis.  STOMACH SURGERY      ulcer, gastric bypass due to \"bleeding ulcer\", partial colectomy due to blockage    UPPER GASTROINTESTINAL ENDOSCOPY           ALLERGIES     Pcn [penicillins] and Ranolazine    FAMILY HISTORY       Family History   Problem Relation Age of Onset    Heart Attack Father     Heart Attack Brother           SOCIAL HISTORY       Social History     Social History    Marital status:      Spouse name: N/A    Number of children: 3    Years of education: N/A     Social History Main Topics    Smoking status: Former Smoker     Packs/day: 10.00     Quit date: 3/20/1967    Smokeless tobacco: Never Used    Alcohol use No    Drug use: No    Sexual activity: Not Asked     Other Topics Concern    None     Social History Narrative    None           PHYSICAL EXAM    (up to 7 for level 4, 8 ormore for level 5)     ED Triage Vitals [10/18/18 1035]   BP Temp Temp src Pulse Resp SpO2 Height Weight   (!) 137/90 98.1 °F (36.7 °C) -- 104 24 100 % 5' 10\" (1.778 m) 159 lb (72.1 kg)       Physical Exam     Physical    Vital signs and nursing notes were reviewed as well as the social, family, and past medical history. Gen. appearance: Patient is alert and oriented and in no acute distress    Head: Atraumatic, normocephalic    Neck: Supple, trachea/thyroid normal    EENT: PERRLA, EOMI, conjunctiva normal.    Skin: Warm and dry with no rash    Cardiovascular: Heart RRR, no gallops or rubs, no aortic enlargement or bruits noted. Respiratory: Lungs clear, no wheezing, no rales, normal breath sounds. Gastrointestinal: Abdomen nontender, bowel sounds normal, no rebound/guarding/distention or mass    Musculoskeletal: No tenderness in the extremities, no back or hip pain.     Neurological: Patient is alert and oriented ×3, no focal motor or sensory deficits noted, reflexes normal, NIH = 0      DIAGNOSTIC RESULTS     EKG: All EKG's are interpreted by the Emergency Department Physicianwho either signs or Co-signs this chart in the absence of a cardiologist.    EKG: normal EKG, normal sinus rhythm, heart rate of 96 with no acute ischemic changes. Kiana Brooks RADIOLOGY:         LABS:  Labs Reviewed   BASIC METABOLIC PANEL - Abnormal; Notable for the following:        Result Value    Glucose 150 (*)     CO2 19 (*)     All other components within normal limits   CBC WITH AUTO DIFFERENTIAL   PROTIME-INR   APTT   TROPONIN       All other labs were within normal range or not returned as of this dictation. EMERGENCY DEPARTMENT COURSE and DIFFERENTIAL DIAGNOSIS/MDM:   Vitals:    Vitals:    10/18/18 1058 10/18/18 1105 10/18/18 1108 10/18/18 1120   BP: (!) 138/110 99/69 99/69 (!) 87/58   Pulse: 96 107 117 109   Resp: 20 30 20 19   Temp:       SpO2: 100% 99% 98% 100%   Weight:       Height:                     REASSESSMENT      Here in the ED patient's pain resolved after nitro and morphine. Patient's initial troponin was negative. I discussed with the transfer center for [facility] and we will plan for transfer for further cardiac evaluation. Patient had requested to go back to [facility] as that's where his procedure just was    PROCEDURES:  Unless otherwise noted below, none     Procedures    FINAL IMPRESSION      1. Chest pain, unspecified type          DISPOSITION/PLAN   DISPOSITION Decision To Transfer 10/18/2018 12:06:06 PM      PATIENT REFERRED TO:  No follow-up provider specified.     DISCHARGE MEDICATIONS:  New Prescriptions    No medications on file          (Please note that portions ofthis note were completed with a voice recognition program.  Efforts were made to edit the dictations but occasionally words are mis-transcribed.)    Tasha Olvera MD(electronically signed)  Attending Emergency Physician            Tasha Olvera MD  10/18/18 6044

## 2018-10-18 NOTE — ED TRIAGE NOTES
Medication list:  complete  Medication information provided by:  Patient and Family  Meds:  per verbal statement      Took 2 baby aspirin prior to arrival and 1 NTG sublingual.

## 2018-11-01 ENCOUNTER — OFFICE VISIT (OUTPATIENT)
Dept: CARDIOLOGY CLINIC | Age: 83
End: 2018-11-01
Payer: MEDICARE

## 2018-11-01 VITALS
BODY MASS INDEX: 23.68 KG/M2 | DIASTOLIC BLOOD PRESSURE: 80 MMHG | WEIGHT: 165 LBS | OXYGEN SATURATION: 97 % | HEART RATE: 64 BPM | SYSTOLIC BLOOD PRESSURE: 150 MMHG

## 2018-11-01 DIAGNOSIS — E55.9 VITAMIN D DEFICIENCY DISEASE: ICD-10-CM

## 2018-11-01 DIAGNOSIS — E78.5 HYPERLIPIDEMIA, UNSPECIFIED HYPERLIPIDEMIA TYPE: ICD-10-CM

## 2018-11-01 DIAGNOSIS — I10 ESSENTIAL HYPERTENSION: ICD-10-CM

## 2018-11-01 DIAGNOSIS — I25.10 CORONARY ARTERY DISEASE INVOLVING NATIVE HEART, ANGINA PRESENCE UNSPECIFIED, UNSPECIFIED VESSEL OR LESION TYPE: Primary | ICD-10-CM

## 2018-11-01 PROCEDURE — G8420 CALC BMI NORM PARAMETERS: HCPCS | Performed by: INTERNAL MEDICINE

## 2018-11-01 PROCEDURE — G8484 FLU IMMUNIZE NO ADMIN: HCPCS | Performed by: INTERNAL MEDICINE

## 2018-11-01 PROCEDURE — 1123F ACP DISCUSS/DSCN MKR DOCD: CPT | Performed by: INTERNAL MEDICINE

## 2018-11-01 PROCEDURE — 99214 OFFICE O/P EST MOD 30 MIN: CPT | Performed by: INTERNAL MEDICINE

## 2018-11-01 PROCEDURE — 4040F PNEUMOC VAC/ADMIN/RCVD: CPT | Performed by: INTERNAL MEDICINE

## 2018-11-01 PROCEDURE — G8428 CUR MEDS NOT DOCUMENT: HCPCS | Performed by: INTERNAL MEDICINE

## 2018-11-01 PROCEDURE — 1036F TOBACCO NON-USER: CPT | Performed by: INTERNAL MEDICINE

## 2018-11-01 PROCEDURE — 1101F PT FALLS ASSESS-DOCD LE1/YR: CPT | Performed by: INTERNAL MEDICINE

## 2018-11-01 PROCEDURE — G8598 ASA/ANTIPLAT THER USED: HCPCS | Performed by: INTERNAL MEDICINE

## 2018-11-01 RX ORDER — EZETIMIBE 10 MG/1
10 TABLET ORAL DAILY
Qty: 30 TABLET | Refills: 3 | Status: SHIPPED | OUTPATIENT
Start: 2018-11-01 | End: 2020-05-11

## 2018-11-01 RX ORDER — ISOSORBIDE MONONITRATE 30 MG/1
30 TABLET, EXTENDED RELEASE ORAL DAILY
Qty: 30 TABLET | Refills: 11 | Status: SHIPPED | OUTPATIENT
Start: 2018-11-01 | End: 2019-09-09 | Stop reason: ALTCHOICE

## 2018-11-01 RX ORDER — ROSUVASTATIN CALCIUM 40 MG/1
40 TABLET, COATED ORAL EVERY EVENING
Qty: 30 TABLET | Refills: 11 | Status: SHIPPED | OUTPATIENT
Start: 2018-11-01 | End: 2020-05-11

## 2018-11-16 NOTE — PROGRESS NOTES
LAD with a good end result. He has chronic severe shortness of breath because of severe COPD. He also has ventral hernia just before his xiphoid process, which is very painful, along with 2 inguinal hernias. He would like to have these repaired, but unfortunately, he has not been able to do so because of heart issues. I saw him in May, and at that time, he was doing relatively well. However, on 10/02, he developed chest pain and was transferred to [facility], where he had a catheterization by Dr. Marina Mclaughlin. The catheterization showed 80% disease in the proximal right coronary artery. This had increased from 50% to 80% since 2016. His proximal LAD was in the 60% to 70% range although the FFR was 0.90 indicating no therapy was necessary. His mid LAD, that had been stented in 2016, was unremarkable. Circumflex was unremarkable, he had normal EF of 60%. He had a subsequent 4.0 x 22 mm Synergy stent in the proximal right coronary artery with a good end result. He developed severe chest discomfort on 10/18, and was transferred again to [facility].  He was kept overnight with negative enzymes and discharged the following day. His pain was relieved with morphine. No etiology was found. He is quite discouraged. He continues to have pain in both his inguinal hernias and his ventral hernia. In addition, his wife has footdrop and they are struggling to maintain with their housework, etc.  His son has banned him from mowing the lawn and, of course, shoveling any snow. He has had no further chest pain. He continues to have shortness of breath, which is unchanged. No PND, orthopnea or pedal edema. His cholesterol has never been controlled. He has been on 80 mg of Lipitor and yet his LDL is still in the 170 range. CARDIAC RISK FACTORS:  Prior MI:  Negative. Known CAD:  Positive. PTCA:  Positive. Hypertension:  Positive. Hyperlipidemia:  Positive. Other Family Members:  Positive.   Peripheral

## 2018-12-06 ENCOUNTER — INITIAL CONSULT (OUTPATIENT)
Dept: SURGERY | Age: 83
End: 2018-12-06
Payer: MEDICARE

## 2018-12-06 VITALS
WEIGHT: 167 LBS | RESPIRATION RATE: 18 BRPM | SYSTOLIC BLOOD PRESSURE: 169 MMHG | DIASTOLIC BLOOD PRESSURE: 100 MMHG | HEART RATE: 70 BPM | HEIGHT: 70 IN | BODY MASS INDEX: 23.91 KG/M2

## 2018-12-06 DIAGNOSIS — K43.2 INCISIONAL HERNIA, WITHOUT OBSTRUCTION OR GANGRENE: Primary | ICD-10-CM

## 2018-12-06 PROCEDURE — G8420 CALC BMI NORM PARAMETERS: HCPCS | Performed by: SURGERY

## 2018-12-06 PROCEDURE — 1123F ACP DISCUSS/DSCN MKR DOCD: CPT | Performed by: SURGERY

## 2018-12-06 PROCEDURE — G8484 FLU IMMUNIZE NO ADMIN: HCPCS | Performed by: SURGERY

## 2018-12-06 PROCEDURE — 1036F TOBACCO NON-USER: CPT | Performed by: SURGERY

## 2018-12-06 PROCEDURE — 99212 OFFICE O/P EST SF 10 MIN: CPT | Performed by: SURGERY

## 2018-12-06 PROCEDURE — 4040F PNEUMOC VAC/ADMIN/RCVD: CPT | Performed by: SURGERY

## 2018-12-06 PROCEDURE — 1101F PT FALLS ASSESS-DOCD LE1/YR: CPT | Performed by: SURGERY

## 2018-12-06 PROCEDURE — G8427 DOCREV CUR MEDS BY ELIG CLIN: HCPCS | Performed by: SURGERY

## 2018-12-06 PROCEDURE — G8598 ASA/ANTIPLAT THER USED: HCPCS | Performed by: SURGERY

## 2018-12-16 ASSESSMENT — ENCOUNTER SYMPTOMS
ABDOMINAL DISTENTION: 1
ABDOMINAL PAIN: 1
SORE THROAT: 0
CHOKING: 0
COUGH: 0
NAUSEA: 0
TROUBLE SWALLOWING: 0
BACK PAIN: 0
VOMITING: 0
SHORTNESS OF BREATH: 0
BLOOD IN STOOL: 0

## 2018-12-16 NOTE — PATIENT INSTRUCTIONS
changes in your health, and be sure to contact your doctor if you have any problems. Where can you learn more? Go to [email or web address] and sign in to your Shoeboxed account. Enter B577 in the Virtuix box to learn more about \"Abdominal Hernia Repair: What to Expect at Home. \"     If you do not have an account, please click on the \"Sign Up Now\" link. Current as of: March 28, 2018  Content Version: 11.8  © 1021-1017 Healthwise, Incorporated. Care instructions adapted under license by [facility] ([facility]). If you have questions about a medical condition or this instruction, always ask your healthcare professional. [address] any warranty or liability for your use of this information.

## 2018-12-16 NOTE — PROGRESS NOTES
Dr. Thomas Rodriguez @ [facility] 32  10/02/2018    Dr. Maritza Yang @ [facility] 19-- Stent x 1    APPENDECTOMY      CARDIAC CATHETERIZATION Left 12/24/14    Dr. Ward Kimble -2 vessel CAD-    CARDIAC CATHETERIZATION  5/4/2007    Dr Maritza Yang: 1. Normal left ventricular systolic function with an estimated ejection fraction of 70%. 2. Elevated left ventricular end-diastolic pressure following angiographic dye load. 3. Double-vessel coronary artery disease with angiographic edivence of restenosis within the intervened-upon segment in the right coronary artery and moderately severe disease just proximal     CARDIAC CATHETERIZATION  5/4/07 con't    to the stented segment in the mid left anterior descending.  CARDIAC CATHETERIZATION  6/12/2008    Dr Blanco: 1. Moderate coronary artery disease, as described, with patent stents with evidence of mild at most in-stent restenosis of the right coronary artery. 2. Normal left ventricular systolic function.  CARDIAC SURGERY      13 stents, aorta and CAD    CHOLECYSTECTOMY      COLECTOMY      COLONOSCOPY      COLONOSCOPY  03-    DILATATION, ESOPHAGUS      EYE SURGERY Bilateral     cataracts    HERNIA REPAIR Right     inguinal    JOINT REPLACEMENT Right 1998 and 2012     knee (3rd surgery)    JOINT REPLACEMENT Right 2012    hip    [place] ESOPHAGOGASTRODUODENOSCOPY TRANSORAL DIAGNOSTIC N/A 6/14/2017    EGD ESOPHAGOGASTRODUODENOSCOPY; photos; bx's performed by Darion Berger MD at [address] [place] [address] PTCA  3/9/2011    Dr Jordan Astorga. Hyperdynamic left ventricular systolic function, estimated ejection fraction of 70%. 2. Normal left ventricular end-diastolic pressure. 3. Double-vessel coronary artery disease with angiographic disease progression involving the stented segment in the mid right coronary artery consistent with angiographic evidence of restenosis.     STOMACH SURGERY      ulcer, gastric bypass due to \"bleeding ulcer\", partial colectomy due to blockage    UPPER

## 2019-02-21 ENCOUNTER — HOSPITAL ENCOUNTER (OUTPATIENT)
Age: 84
Discharge: HOME | End: 2019-02-21
Payer: MEDICARE

## 2019-02-21 DIAGNOSIS — I10 ESSENTIAL HYPERTENSION: ICD-10-CM

## 2019-02-21 DIAGNOSIS — I25.10 CORONARY ARTERY DISEASE INVOLVING NATIVE HEART, ANGINA PRESENCE UNSPECIFIED, UNSPECIFIED VESSEL OR LESION TYPE: ICD-10-CM

## 2019-02-21 DIAGNOSIS — E55.9 VITAMIN D DEFICIENCY DISEASE: ICD-10-CM

## 2019-02-21 DIAGNOSIS — E78.5 HYPERLIPIDEMIA, UNSPECIFIED HYPERLIPIDEMIA TYPE: ICD-10-CM

## 2019-02-21 LAB
ALBUMIN SERPL-MCNC: 4.4 G/DL (ref 3.5–5.2)
ALBUMIN/GLOBULIN RATIO: ABNORMAL (ref 1–2.5)
ALP BLD-CCNC: 73 U/L (ref 40–129)
ALT SERPL-CCNC: 34 U/L (ref 5–41)
ANION GAP SERPL CALCULATED.3IONS-SCNC: 10 MMOL/L (ref 9–17)
AST SERPL-CCNC: 30 U/L
BILIRUB SERPL-MCNC: 0.4 MG/DL (ref 0.3–1.2)
BUN BLDV-MCNC: 20 MG/DL (ref 8–23)
BUN/CREAT BLD: 18 (ref 9–20)
CALCIUM SERPL-MCNC: 9.6 MG/DL (ref 8.6–10.4)
CHLORIDE BLD-SCNC: 107 MMOL/L (ref 98–107)
CHOLESTEROL/HDL RATIO: 2.9
CHOLESTEROL: 126 MG/DL
CO2: 26 MMOL/L (ref 20–31)
CREAT SERPL-MCNC: 1.12 MG/DL (ref 0.7–1.2)
GFR AFRICAN AMERICAN: >60 ML/MIN
GFR NON-AFRICAN AMERICAN: >60 ML/MIN
GFR SERPL CREATININE-BSD FRML MDRD: ABNORMAL ML/MIN/{1.73_M2}
GFR SERPL CREATININE-BSD FRML MDRD: ABNORMAL ML/MIN/{1.73_M2}
GLUCOSE BLD-MCNC: 125 MG/DL (ref 70–99)
HDLC SERPL-MCNC: 44 MG/DL
LDL CHOLESTEROL: 60 MG/DL (ref 0–130)
PATIENT FASTING?: YES
POTASSIUM SERPL-SCNC: 4.7 MMOL/L (ref 3.7–5.3)
SODIUM BLD-SCNC: 143 MMOL/L (ref 135–144)
TOTAL PROTEIN: 7.1 G/DL (ref 6.4–8.3)
TRIGL SERPL-MCNC: 109 MG/DL
VLDLC SERPL CALC-MCNC: NORMAL MG/DL (ref 1–30)

## 2019-02-21 PROCEDURE — 80061 LIPID PANEL: CPT

## 2019-02-21 PROCEDURE — 36415 COLL VENOUS BLD VENIPUNCTURE: CPT

## 2019-02-21 PROCEDURE — 80053 COMPREHEN METABOLIC PANEL: CPT

## 2019-03-12 ENCOUNTER — OFFICE VISIT (OUTPATIENT)
Dept: CARDIOLOGY CLINIC | Age: 84
End: 2019-03-12
Payer: MEDICARE

## 2019-03-12 VITALS
OXYGEN SATURATION: 98 % | SYSTOLIC BLOOD PRESSURE: 140 MMHG | WEIGHT: 172 LBS | BODY MASS INDEX: 24.68 KG/M2 | DIASTOLIC BLOOD PRESSURE: 88 MMHG | HEART RATE: 80 BPM

## 2019-03-12 DIAGNOSIS — I25.10 CORONARY ARTERY DISEASE INVOLVING NATIVE HEART, ANGINA PRESENCE UNSPECIFIED, UNSPECIFIED VESSEL OR LESION TYPE: Primary | ICD-10-CM

## 2019-03-12 DIAGNOSIS — E55.9 VITAMIN D DEFICIENCY DISEASE: ICD-10-CM

## 2019-03-12 DIAGNOSIS — I10 ESSENTIAL HYPERTENSION: ICD-10-CM

## 2019-03-12 DIAGNOSIS — E78.5 HYPERLIPIDEMIA, UNSPECIFIED HYPERLIPIDEMIA TYPE: ICD-10-CM

## 2019-03-12 PROCEDURE — G8427 DOCREV CUR MEDS BY ELIG CLIN: HCPCS | Performed by: INTERNAL MEDICINE

## 2019-03-12 PROCEDURE — 1101F PT FALLS ASSESS-DOCD LE1/YR: CPT | Performed by: INTERNAL MEDICINE

## 2019-03-12 PROCEDURE — 1036F TOBACCO NON-USER: CPT | Performed by: INTERNAL MEDICINE

## 2019-03-12 PROCEDURE — 4040F PNEUMOC VAC/ADMIN/RCVD: CPT | Performed by: INTERNAL MEDICINE

## 2019-03-12 PROCEDURE — 99213 OFFICE O/P EST LOW 20 MIN: CPT | Performed by: INTERNAL MEDICINE

## 2019-03-12 PROCEDURE — 1123F ACP DISCUSS/DSCN MKR DOCD: CPT | Performed by: INTERNAL MEDICINE

## 2019-03-12 PROCEDURE — G8420 CALC BMI NORM PARAMETERS: HCPCS | Performed by: INTERNAL MEDICINE

## 2019-03-12 PROCEDURE — G8484 FLU IMMUNIZE NO ADMIN: HCPCS | Performed by: INTERNAL MEDICINE

## 2019-03-12 PROCEDURE — G8598 ASA/ANTIPLAT THER USED: HCPCS | Performed by: INTERNAL MEDICINE

## 2019-06-14 ENCOUNTER — APPOINTMENT (OUTPATIENT)
Dept: GENERAL RADIOLOGY | Age: 84
End: 2019-06-14
Payer: MEDICARE

## 2019-06-14 ENCOUNTER — HOSPITAL ENCOUNTER (EMERGENCY)
Age: 84
Discharge: ANOTHER HOSPITAL | End: 2019-06-14
Attending: INTERNAL MEDICINE
Payer: MEDICARE

## 2019-06-14 ENCOUNTER — APPOINTMENT (OUTPATIENT)
Dept: CT IMAGING | Age: 84
End: 2019-06-14
Payer: MEDICARE

## 2019-06-14 VITALS
TEMPERATURE: 97.8 F | DIASTOLIC BLOOD PRESSURE: 73 MMHG | WEIGHT: 162.3 LBS | RESPIRATION RATE: 17 BRPM | OXYGEN SATURATION: 98 % | SYSTOLIC BLOOD PRESSURE: 108 MMHG | BODY MASS INDEX: 23.29 KG/M2 | HEART RATE: 70 BPM

## 2019-06-14 DIAGNOSIS — M54.9 ACUTE UPPER BACK PAIN: ICD-10-CM

## 2019-06-14 DIAGNOSIS — I20.0 UNSTABLE ANGINA (HCC): Primary | ICD-10-CM

## 2019-06-14 DIAGNOSIS — R07.9 ACUTE CHEST PAIN: ICD-10-CM

## 2019-06-14 LAB
ABSOLUTE EOS #: 0.1 K/UL (ref 0–0.4)
ABSOLUTE IMMATURE GRANULOCYTE: ABNORMAL K/UL (ref 0–0.3)
ABSOLUTE LYMPH #: 1.4 K/UL (ref 1–4.8)
ABSOLUTE MONO #: 1 K/UL (ref 0–1)
ALBUMIN SERPL-MCNC: 5.2 G/DL (ref 3.5–5.2)
ALBUMIN/GLOBULIN RATIO: ABNORMAL (ref 1–2.5)
ALP BLD-CCNC: 91 U/L (ref 40–129)
ALT SERPL-CCNC: 31 U/L (ref 5–41)
ANION GAP SERPL CALCULATED.3IONS-SCNC: 18 MMOL/L (ref 9–17)
AST SERPL-CCNC: 29 U/L
BASOPHILS # BLD: 0 % (ref 0–2)
BASOPHILS ABSOLUTE: 0 K/UL (ref 0–0.2)
BILIRUB SERPL-MCNC: 0.63 MG/DL (ref 0.3–1.2)
BNP INTERPRETATION: NORMAL
BUN BLDV-MCNC: 19 MG/DL (ref 8–23)
BUN/CREAT BLD: 17 (ref 9–20)
CALCIUM SERPL-MCNC: 11 MG/DL (ref 8.6–10.4)
CHLORIDE BLD-SCNC: 99 MMOL/L (ref 98–107)
CO2: 21 MMOL/L (ref 20–31)
CREAT SERPL-MCNC: 1.14 MG/DL (ref 0.7–1.2)
D-DIMER QUANTITATIVE: 0.36 MG/L FEU (ref 0–0.5)
DIFFERENTIAL TYPE: YES
EOSINOPHILS RELATIVE PERCENT: 1 % (ref 0–5)
GFR AFRICAN AMERICAN: >60 ML/MIN
GFR NON-AFRICAN AMERICAN: >60 ML/MIN
GFR SERPL CREATININE-BSD FRML MDRD: ABNORMAL ML/MIN/{1.73_M2}
GFR SERPL CREATININE-BSD FRML MDRD: ABNORMAL ML/MIN/{1.73_M2}
GLUCOSE BLD-MCNC: 138 MG/DL (ref 70–99)
HCT VFR BLD CALC: 46 % (ref 41–53)
HEMOGLOBIN: 15.4 G/DL (ref 13.5–17.5)
IMMATURE GRANULOCYTES: ABNORMAL %
LYMPHOCYTES # BLD: 17 % (ref 13–44)
MCH RBC QN AUTO: 28.1 PG (ref 26–34)
MCHC RBC AUTO-ENTMCNC: 33.4 G/DL (ref 31–37)
MCV RBC AUTO: 84.2 FL (ref 80–100)
MONOCYTES # BLD: 12 % (ref 5–9)
NRBC AUTOMATED: ABNORMAL PER 100 WBC
PDW BLD-RTO: 14.9 % (ref 12.1–15.2)
PLATELET # BLD: 326 K/UL (ref 140–450)
PLATELET ESTIMATE: ABNORMAL
PMV BLD AUTO: ABNORMAL FL (ref 6–12)
POTASSIUM SERPL-SCNC: 4.5 MMOL/L (ref 3.7–5.3)
PRO-BNP: 138 PG/ML
RBC # BLD: 5.47 M/UL (ref 4.5–5.9)
RBC # BLD: ABNORMAL 10*6/UL
SEG NEUTROPHILS: 70 % (ref 39–75)
SEGMENTED NEUTROPHILS ABSOLUTE COUNT: 6.1 K/UL (ref 2.1–6.5)
SODIUM BLD-SCNC: 138 MMOL/L (ref 135–144)
TOTAL PROTEIN: 8.4 G/DL (ref 6.4–8.3)
TROPONIN INTERP: NORMAL
TROPONIN INTERP: NORMAL
TROPONIN T: <0.03 NG/ML
TROPONIN T: <0.03 NG/ML
TROPONIN, HIGH SENSITIVITY: NORMAL NG/L (ref 0–22)
TROPONIN, HIGH SENSITIVITY: NORMAL NG/L (ref 0–22)
WBC # BLD: 8.7 K/UL (ref 3.5–11)
WBC # BLD: ABNORMAL 10*3/UL

## 2019-06-14 PROCEDURE — 6360000004 HC RX CONTRAST MEDICATION: Performed by: INTERNAL MEDICINE

## 2019-06-14 PROCEDURE — 6360000002 HC RX W HCPCS: Performed by: INTERNAL MEDICINE

## 2019-06-14 PROCEDURE — 93005 ELECTROCARDIOGRAM TRACING: CPT | Performed by: INTERNAL MEDICINE

## 2019-06-14 PROCEDURE — 36415 COLL VENOUS BLD VENIPUNCTURE: CPT

## 2019-06-14 PROCEDURE — 71275 CT ANGIOGRAPHY CHEST: CPT

## 2019-06-14 PROCEDURE — 85379 FIBRIN DEGRADATION QUANT: CPT

## 2019-06-14 PROCEDURE — 6370000000 HC RX 637 (ALT 250 FOR IP): Performed by: INTERNAL MEDICINE

## 2019-06-14 PROCEDURE — 99285 EMERGENCY DEPT VISIT HI MDM: CPT

## 2019-06-14 PROCEDURE — 96375 TX/PRO/DX INJ NEW DRUG ADDON: CPT

## 2019-06-14 PROCEDURE — 96374 THER/PROPH/DIAG INJ IV PUSH: CPT

## 2019-06-14 PROCEDURE — 80053 COMPREHEN METABOLIC PANEL: CPT

## 2019-06-14 PROCEDURE — 83880 ASSAY OF NATRIURETIC PEPTIDE: CPT

## 2019-06-14 PROCEDURE — 84484 ASSAY OF TROPONIN QUANT: CPT

## 2019-06-14 PROCEDURE — 85025 COMPLETE CBC W/AUTO DIFF WBC: CPT

## 2019-06-14 PROCEDURE — 2580000003 HC RX 258: Performed by: INTERNAL MEDICINE

## 2019-06-14 PROCEDURE — 71045 X-RAY EXAM CHEST 1 VIEW: CPT

## 2019-06-14 RX ORDER — ONDANSETRON 2 MG/ML
4 INJECTION INTRAMUSCULAR; INTRAVENOUS ONCE
Status: COMPLETED | OUTPATIENT
Start: 2019-06-14 | End: 2019-06-14

## 2019-06-14 RX ORDER — MORPHINE SULFATE 4 MG/ML
4 INJECTION, SOLUTION INTRAMUSCULAR; INTRAVENOUS ONCE
Status: COMPLETED | OUTPATIENT
Start: 2019-06-14 | End: 2019-06-14

## 2019-06-14 RX ORDER — ASPIRIN 81 MG/1
324 TABLET, CHEWABLE ORAL ONCE
Status: COMPLETED | OUTPATIENT
Start: 2019-06-14 | End: 2019-06-14

## 2019-06-14 RX ORDER — CYCLOBENZAPRINE HCL 10 MG
10 TABLET ORAL ONCE
Status: COMPLETED | OUTPATIENT
Start: 2019-06-14 | End: 2019-06-14

## 2019-06-14 RX ORDER — 0.9 % SODIUM CHLORIDE 0.9 %
1000 INTRAVENOUS SOLUTION INTRAVENOUS ONCE
Status: COMPLETED | OUTPATIENT
Start: 2019-06-14 | End: 2019-06-14

## 2019-06-14 RX ADMIN — MORPHINE SULFATE 4 MG: 4 INJECTION INTRAVENOUS at 14:14

## 2019-06-14 RX ADMIN — CYCLOBENZAPRINE HYDROCHLORIDE 10 MG: 10 TABLET, FILM COATED ORAL at 15:50

## 2019-06-14 RX ADMIN — ONDANSETRON 4 MG: 2 INJECTION, SOLUTION INTRAMUSCULAR; INTRAVENOUS at 14:15

## 2019-06-14 RX ADMIN — ASPIRIN 81 MG 162 MG: 81 TABLET ORAL at 13:42

## 2019-06-14 RX ADMIN — SODIUM CHLORIDE 1000 ML: 9 INJECTION, SOLUTION INTRAVENOUS at 15:59

## 2019-06-14 RX ADMIN — IOPAMIDOL 100 ML: 755 INJECTION, SOLUTION INTRAVENOUS at 14:56

## 2019-06-14 ASSESSMENT — PAIN DESCRIPTION - LOCATION: LOCATION: CHEST;BACK

## 2019-06-14 ASSESSMENT — PAIN SCALES - GENERAL
PAINLEVEL_OUTOF10: 10
PAINLEVEL_OUTOF10: 3
PAINLEVEL_OUTOF10: 10

## 2019-06-14 ASSESSMENT — PAIN DESCRIPTION - PAIN TYPE: TYPE: ACUTE PAIN

## 2019-06-14 NOTE — ED PROVIDER NOTES
[facility] ED  EMERGENCY DEPARTMENT ENCOUNTER      Pt Name: Kareen Swift  MRN: [id number]  [place] 1935  Date of evaluation: 6/14/2019  Provider: Mirela Duran MD    CHIEF COMPLAINT       Chief Complaint   Patient presents with    Chest Pain     mid chest pain woke up with this am, took 2 baby asa and 1 nitro PTA     Shortness of Breath         HISTORY OF PRESENT ILLNESS   (Location/Symptom, Timing/Onset, Context/Setting, Quality, Duration, Modifying Factors, Severity)  Note limiting factors. Kareen Swift is a 82 y.o. male who is on Plavix for coronary disease, has COPD, hypertension and former tobacco abuse, presents to the emergency department for evaluation and management of right sided chest pain which he describes as pressure that started about 2 hours before presentation. He says that left upper back pain preceded this pain. He states that it hurts to breathe. He rates it 6 /10 and constant. He took 2 baby aspirins and 1 nitroglycerin with seem to improve the pain. He states that he has had some intermittent shortness of breath. He had stents placed in October 2018. Sees Dr. Cheryle Kocher for his cardiology care. [facility]    Nursing Notes were reviewed. REVIEW OF SYSTEMS    (2-9 systems for level 4, 10 or more for level 5)       REVIEW OF SYSTEMS    Constitutional: Negative for fatigue and fever. Respiratory: Positive for intermittent shortness of breath, negative for cough, chest tightness   Cardiovascular: Positive for right sided chest pain, pleuritic chest pain, neg for palpitations and leg swelling. Gastrointestinal: Negative for abdominal distention, abdominal pain, diarrhea, nausea and vomiting. Musculoskeletal: Positive for left upper back pain, negative for arthralgias, and neck pain. Neurological: Negative for dizziness, speech difficulty, weakness, numbness and headaches. Hematological: Positive for anticoagulation with Plavix, negative for adenopathy.    All other systems reviewed and are negative. Except as noted above theremainder of the review of systems was reviewed and negative. PASTMEDICAL HISTORY     Past Medical History:   Diagnosis Date    Anginal pain ([address]     CAD (coronary artery disease)     Diverticula of colon     Hyperlipidemia     Hypertension     Kidney stone          SURGICAL HISTORY       Past Surgical History:   Procedure Laterality Date    ABDOMEN SURGERY      partial gastrectomy    ANGIOPLASTY  06/29/2016    Dr. Lara Gay @ [facility] 32  10/02/2018    Dr. Gennaro Brasher @ [facility] 19-- Stent x 1    APPENDECTOMY      CARDIAC CATHETERIZATION Left 12/24/14    Dr. Sanjuanita Scott -2 vessel CAD-    CARDIAC CATHETERIZATION  5/4/2007    Dr Gennaro Brasher: 1. Normal left ventricular systolic function with an estimated ejection fraction of 70%. 2. Elevated left ventricular end-diastolic pressure following angiographic dye load. 3. Double-vessel coronary artery disease with angiographic edivence of restenosis within the intervened-upon segment in the right coronary artery and moderately severe disease just proximal     CARDIAC CATHETERIZATION  5/4/07 con't    to the stented segment in the mid left anterior descending.  CARDIAC CATHETERIZATION  6/12/2008    Dr Blanco: 1. Moderate coronary artery disease, as described, with patent stents with evidence of mild at most in-stent restenosis of the right coronary artery. 2. Normal left ventricular systolic function.     CARDIAC SURGERY      13 stents, aorta and CAD    CHOLECYSTECTOMY      COLECTOMY      COLONOSCOPY      COLONOSCOPY  03-    DILATATION, ESOPHAGUS      EYE SURGERY Bilateral     cataracts    HERNIA REPAIR Right     inguinal    JOINT REPLACEMENT Right 1998 and 2012     knee (3rd surgery)    JOINT REPLACEMENT Right 2012    hip    [place] ESOPHAGOGASTRODUODENOSCOPY TRANSORAL DIAGNOSTIC N/A 6/14/2017    EGD ESOPHAGOGASTRODUODENOSCOPY; photos; bx's performed by Junior Lane MD at [address] [facility] PTCA  3/9/2011    Dr La Betancur. Hyperdynamic left ventricular systolic function, estimated ejection fraction of 70%. 2. Normal left ventricular end-diastolic pressure. 3. Double-vessel coronary artery disease with angiographic disease progression involving the stented segment in the mid right coronary artery consistent with angiographic evidence of restenosis.  STOMACH SURGERY      ulcer, gastric bypass due to \"bleeding ulcer\", partial colectomy due to blockage    UPPER GASTROINTESTINAL ENDOSCOPY           CURRENT MEDICATIONS       Discharge Medication List as of 6/14/2019  6:55 PM      CONTINUE these medications which have NOT CHANGED    Details   isosorbide mononitrate (IMDUR) 30 MG extended release tablet Take 1 tablet by mouth daily, Disp-30 tablet, R-11Print      rosuvastatin (CRESTOR) 40 MG tablet Take 1 tablet by mouth every evening, Disp-30 tablet, R-11Print      ezetimibe (ZETIA) 10 MG tablet Take 1 tablet by mouth daily, Disp-30 tablet, R-3Print      aspirin 81 MG tablet Take 81 mg by mouth daily Historical Med      traMADol (ULTRAM) 50 MG tablet Take 50 mg by mouth 3 times daily as needed for Pain  Do not take in close timeframe with Clonazepam .Historical Med      gabapentin (NEURONTIN) 400 MG capsule Take 400 mg by mouth every eveningHistorical Med      venlafaxine (EFFEXOR) 100 MG tablet Take 100 mg by mouth 2 times daily Historical Med      omeprazole (PRILOSEC) 20 MG capsule Take 20 mg by mouth 2 times daily      vitamin D (CHOLECALCIFEROL) 1000 UNIT TABS tablet Take 3,000 Units by mouth daily Historical Med      propranolol (INDERAL) 60 MG tablet Take 60 mg by mouth 3 times daily      clopidogrel (PLAVIX) 75 MG tablet Take 75 mg by mouth daily. clonazePAM (KLONOPIN) 0.5 MG tablet Take 0.5 mg by mouth 2 times daily  . Historical Med      nitroGLYCERIN (NITROSTAT) 0.4 MG SL tablet Place 0.4 mg under the tongue every 5 minutes as needed.       ondansetron (ZOFRAN) 4 MG tablet Take 1 tablet by mouth every 6 hours as needed for Nausea, Disp-30 tablet, R-0Normal      docusate sodium (COLACE) 100 MG capsule Take 1 capsule by mouth 3 times daily as needed for Constipation, Disp-45 capsule, R-0Normal             ALLERGIES     Pcn [penicillins] and Ranolazine    FAMILY HISTORY       Family History   Problem Relation Age of Onset    Heart Attack Father     Heart Attack Brother           SOCIAL HISTORY       Social History     Socioeconomic History    Marital status:      Spouse name: None    Number of children: 3    Years of education: None    Highest education level: None   Occupational History    None   Social Needs    Financial resource strain: None    Food insecurity:     Worry: None     Inability: None    Transportation needs:     Medical: None     Non-medical: None   Tobacco Use    Smoking status: Former Smoker     Packs/day: 10.00     Last attempt to quit: 3/20/1967     Years since quittin.2    Smokeless tobacco: Never Used   Substance and Sexual Activity    Alcohol use: No    Drug use: No    Sexual activity: None   Lifestyle    Physical activity:     Days per week: None     Minutes per session: None    Stress: None   Relationships    Social connections:     Talks on phone: None     Gets together: None     Attends Restorationism service: None     Active member of club or organization: None     Attends meetings of clubs or organizations: None     Relationship status: None    Intimate partner violence:     Fear of current or ex partner: None     Emotionally abused: None     Physically abused: None     Forced sexual activity: None   Other Topics Concern    None   Social History Narrative    None       SCREENINGS    [place] Coma Scale  Eye Opening: Spontaneous  Best Verbal Response: Oriented  Best Motor Response: Obeys commands  Jorge Coma Scale Score: 15        PHYSICAL EXAM    (up to 7 for level 4, 8 or more for level 5)     ED Triage Vitals   BP Temp Temp src Pulse Resp SpO2 Height Weight   -- -- -- -- -- -- -- --       Physical Exam  Physical Exam   Constitutional:  Appears well, well-developed and well-nourished. No distress noted. Non toxic in appearance. HENT:     Head: Normocephalic and atraumatic. Mouth/Throat: Oropharynx is clear and mucosa moist. No oropharyngeal exudate noted. Posterior pharynx is pink and noninjected. Eyes: Conjunctivae and EOM are normal. Pupils are equal,round, and reactive to light. No scleral icterus. Neck: Normal range of motion. Neck supple. No tracheal deviation present. Cardiovascular: Normal rate, regular rhythm, normal heartsounds and intact distal pulses. Exam reveals no gallop or friction rub. No murmur heard. Pulmonary/Chest: Effort normal and breath sounds are symmetric and normal. No respiratory distress. There are no wheezes, rales or rhonchi. No tenderness is exhibited upon palpation of the chest wall. Abdominal: Soft. Bowel sounds are normal. No distension or no mass exhibitted. There is no tenderness, rebound, rigidity or guarding. Genitourinary:   No CVA tenderness noted on examination. Musculoskeletal: There is tenderness of the suprascapular region on the left with palpation. No tenderness of the spinous processes or step-offs noted. Normal range of motion. Lymphadenopathy:  No cervical adenopathy. Neurological:   alert and oriented to person, place, and time. Reflexes are normal.  There are no cranial nerve deficits. Normal muscle tone, motor and sensory function exhibitted. Coordination and gait normal.   Skin: Skin is warm and dry. No rash noted. No diaphoresis. No erythema. No pallor. Psychiatric: Pleasant and cooperative. normal mood and affect.  Behavior is  normal. Judgment and thought content normal.     DIAGNOSTIC RESULTS     EKG: All EKG's are interpreted by the Emergency Department Physician who either signs or Co-signs this chart in the absence of a cardiologist.    A 12-lead EKG was TIME   Total Critical Care time was 0 minutes. [facility]  ED Course as of Jun 14 2229 Fri Jun 14, 2019   1506 Patient denies pain at this time. [MS]   7021 I spoke with the radiology department requesting a read on his CT.    [MS]   6690 Chest pain virtually gone. There is some residual left upper back pain, somewhat reproducible with movement. [MS]   1556 I requested a call be placed to Dr. Tito Isabel for consultation. [MS]   46 Dr. Tito Isabel returned call and recommended that the patient be transferred to [facility] due to concerns of restenosis. [MS]   [phone] I requested that a call be placed to [facility] transfer [place] to initiate transfer to [facility] per patient agreement and recommendation by Dr. Anaid Walker. [MS]   Fabienne Baron [place] [address] from [facility] transfer center returned call to  discuss this patient. [MS]      ED Course User Index  [MS] Rogelio Melgar MD       CONSULTS:  None    PROCEDURES:  Unlessotherwise noted below, none     Procedures      Summation    Patient with a history of coronary disease and recent coronary artery restenosis, was transferred to [facility] after discussion with Dr. Anaid Walker for further evaluation and management in stable condition. There is no evidence of pulmonary embolus or aortic dissection to explain his symptoms. Patient Course:   ED Course as of Jun 14 2229 Fri Jun 14, 2019   1506 Patient denies pain at this time. [MS]   1929 I spoke with the radiology department requesting a read on his CT.    [MS]   1847 Chest pain virtually gone. There is some residual left upper back pain, somewhat reproducible with movement. [MS]   1556 I requested a call be placed to Dr. Tito Isabel for consultation. [MS]   46 Dr. Tito Isabel returned call and recommended that the patient be transferred to [facility] due to concerns of restenosis.     [MS]   [phone] I requested that a call be placed to [facility] transfer center to initiate transfer to [facility] per patient agreement and recommendation by Dr. Romie Clark. [MS]   Fabienne Colon [address] from [facility] returned call to  discuss this patient. [MS]      ED Course User Index  [MS] Carlos Leslie MD         ED Medicationsadministered this visit:    Medications   aspirin chewable tablet 324 mg (162 mg Oral Given 6/14/19 1342)   morphine sulfate (PF) injection 4 mg (4 mg Intravenous Given 6/14/19 1414)   ondansetron (ZOFRAN) injection 4 mg (4 mg Intravenous Given 6/14/19 1415)   iopamidol (ISOVUE-370) 76 % injection 100 mL (100 mLs Intravenous Given 6/14/19 1456)   cyclobenzaprine (FLEXERIL) tablet 10 mg (10 mg Oral Given 6/14/19 1550)   0.9 % sodium chloride bolus (0 mLs Intravenous Stopped 6/14/19 1803)       New Prescriptions from this visit:    Discharge Medication List as of 6/14/2019  6:55 PM          Follow-up:  No follow-up provider specified. Final Impression:   1. Unstable angina ([address]    2. Acute upper back pain    3. Acute chest pain               (Please note that portions of this note werecompleted with a voice recognition program.  Efforts were made to edit the dictations but occasionally words are mis-transcribed.)    FINAL IMPRESSION      1. Unstable angina ([address]    2. Acute upper back pain    3. Acute chest pain          DISPOSITION/PLAN   DISPOSITION        PATIENT REFERRED TO:  No follow-up provider specified.     DISCHARGE MEDICATIONS:  Discharge Medication List as of 6/14/2019  6:55 PM             (Please note that portions of this note were completed with a voice recognition program.  Efforts were made to edit the dictations but occasionally words are mis-transcribed.)    Carlos Leslie MD (electronically signed)  Attending Emergency Physician            Carlos Leslie MD  06/14/19 2037       Carlos Leslie MD  06/14/19 2228

## 2019-06-16 LAB
EKG ATRIAL RATE: 118 BPM
EKG P AXIS: 43 DEGREES
EKG P-R INTERVAL: 170 MS
EKG Q-T INTERVAL: 324 MS
EKG QRS DURATION: 126 MS
EKG QTC CALCULATION (BAZETT): 454 MS
EKG R AXIS: -73 DEGREES
EKG T AXIS: 21 DEGREES
EKG VENTRICULAR RATE: 118 BPM

## 2019-06-16 PROCEDURE — 93010 ELECTROCARDIOGRAM REPORT: CPT | Performed by: INTERNAL MEDICINE

## 2019-06-28 ENCOUNTER — TELEPHONE (OUTPATIENT)
Dept: UROLOGY | Age: 84
End: 2019-06-28

## 2019-07-02 ENCOUNTER — HOSPITAL ENCOUNTER (OUTPATIENT)
Dept: GENERAL RADIOLOGY | Age: 84
Discharge: HOME | End: 2019-07-04
Payer: COMMERCIAL

## 2019-07-02 ENCOUNTER — HOSPITAL ENCOUNTER (OUTPATIENT)
Age: 84
Discharge: HOME | End: 2019-07-04
Payer: COMMERCIAL

## 2019-07-02 DIAGNOSIS — R52 ACUTE PAIN: ICD-10-CM

## 2019-07-02 PROCEDURE — 73030 X-RAY EXAM OF SHOULDER: CPT

## 2019-07-02 PROCEDURE — 71045 X-RAY EXAM CHEST 1 VIEW: CPT

## 2019-07-29 ENCOUNTER — HOSPITAL ENCOUNTER (OUTPATIENT)
Dept: NON INVASIVE DIAGNOSTICS | Age: 84
Discharge: HOME | End: 2019-07-29
Payer: MEDICARE

## 2019-07-29 ENCOUNTER — TELEPHONE (OUTPATIENT)
Dept: CARDIOLOGY CLINIC | Age: 84
End: 2019-07-29

## 2019-07-29 DIAGNOSIS — I49.3 PVC (PREMATURE VENTRICULAR CONTRACTION): ICD-10-CM

## 2019-07-29 DIAGNOSIS — I49.3 PVC (PREMATURE VENTRICULAR CONTRACTION): Primary | ICD-10-CM

## 2019-07-29 PROCEDURE — 93225 XTRNL ECG REC<48 HRS REC: CPT

## 2019-07-29 PROCEDURE — 93226 XTRNL ECG REC<48 HR SCAN A/R: CPT

## 2019-08-22 ENCOUNTER — HOSPITAL ENCOUNTER (OUTPATIENT)
Dept: GENERAL RADIOLOGY | Age: 84
Discharge: HOME | End: 2019-08-24
Payer: MEDICARE

## 2019-08-22 ENCOUNTER — HOSPITAL ENCOUNTER (OUTPATIENT)
Age: 84
Discharge: HOME | End: 2019-08-24
Payer: MEDICARE

## 2019-08-22 DIAGNOSIS — M54.2 NECK PAIN: ICD-10-CM

## 2019-08-22 PROCEDURE — 72050 X-RAY EXAM NECK SPINE 4/5VWS: CPT

## 2019-09-04 ENCOUNTER — HOSPITAL ENCOUNTER (OUTPATIENT)
Dept: PHYSICAL THERAPY | Age: 84
Setting detail: THERAPIES SERIES
End: 2019-09-04
Payer: MEDICARE

## 2019-09-09 ENCOUNTER — HOSPITAL ENCOUNTER (OUTPATIENT)
Age: 84
Discharge: HOME | End: 2019-09-11
Payer: MEDICARE

## 2019-09-09 ENCOUNTER — HOSPITAL ENCOUNTER (OUTPATIENT)
Age: 84
Discharge: HOME | End: 2019-09-09
Payer: MEDICARE

## 2019-09-09 ENCOUNTER — OFFICE VISIT (OUTPATIENT)
Dept: CARDIOLOGY CLINIC | Age: 84
End: 2019-09-09
Payer: MEDICARE

## 2019-09-09 ENCOUNTER — HOSPITAL ENCOUNTER (OUTPATIENT)
Dept: GENERAL RADIOLOGY | Age: 84
Discharge: HOME | End: 2019-09-11
Payer: MEDICARE

## 2019-09-09 VITALS
SYSTOLIC BLOOD PRESSURE: 140 MMHG | OXYGEN SATURATION: 98 % | HEART RATE: 91 BPM | BODY MASS INDEX: 22.44 KG/M2 | WEIGHT: 156.4 LBS | DIASTOLIC BLOOD PRESSURE: 90 MMHG

## 2019-09-09 DIAGNOSIS — I25.10 CORONARY ARTERY DISEASE INVOLVING NATIVE HEART, ANGINA PRESENCE UNSPECIFIED, UNSPECIFIED VESSEL OR LESION TYPE: ICD-10-CM

## 2019-09-09 DIAGNOSIS — E55.9 VITAMIN D DEFICIENCY DISEASE: ICD-10-CM

## 2019-09-09 DIAGNOSIS — I10 ESSENTIAL HYPERTENSION: ICD-10-CM

## 2019-09-09 DIAGNOSIS — E78.5 HYPERLIPIDEMIA, UNSPECIFIED HYPERLIPIDEMIA TYPE: ICD-10-CM

## 2019-09-09 DIAGNOSIS — I25.810 ATHEROSCLEROSIS OF CORONARY ARTERY BYPASS GRAFT OF NATIVE HEART WITHOUT ANGINA PECTORIS: ICD-10-CM

## 2019-09-09 DIAGNOSIS — Z98.61 POST PTCA: Primary | ICD-10-CM

## 2019-09-09 LAB
ABSOLUTE EOS #: 0.1 K/UL (ref 0–0.4)
ABSOLUTE IMMATURE GRANULOCYTE: ABNORMAL K/UL (ref 0–0.3)
ABSOLUTE LYMPH #: 1.3 K/UL (ref 1–4.8)
ABSOLUTE MONO #: 0.7 K/UL (ref 0–1)
ALBUMIN SERPL-MCNC: 3.9 G/DL (ref 3.5–5.2)
ALBUMIN/GLOBULIN RATIO: ABNORMAL (ref 1–2.5)
ALP BLD-CCNC: 87 U/L (ref 40–129)
ALT SERPL-CCNC: 15 U/L (ref 5–41)
ANION GAP SERPL CALCULATED.3IONS-SCNC: 11 MMOL/L (ref 9–17)
AST SERPL-CCNC: 17 U/L
BASOPHILS # BLD: 0 % (ref 0–2)
BASOPHILS ABSOLUTE: 0 K/UL (ref 0–0.2)
BILIRUB SERPL-MCNC: 0.36 MG/DL (ref 0.3–1.2)
BUN BLDV-MCNC: 13 MG/DL (ref 8–23)
BUN/CREAT BLD: 13 (ref 9–20)
CALCIUM SERPL-MCNC: 10.3 MG/DL (ref 8.6–10.4)
CHLORIDE BLD-SCNC: 103 MMOL/L (ref 98–107)
CHOLESTEROL/HDL RATIO: 2.7
CHOLESTEROL: 123 MG/DL
CO2: 24 MMOL/L (ref 20–31)
CREAT SERPL-MCNC: 1.03 MG/DL (ref 0.7–1.2)
DIFFERENTIAL TYPE: YES
EOSINOPHILS RELATIVE PERCENT: 2 % (ref 0–5)
GFR AFRICAN AMERICAN: >60 ML/MIN
GFR NON-AFRICAN AMERICAN: >60 ML/MIN
GFR SERPL CREATININE-BSD FRML MDRD: ABNORMAL ML/MIN/{1.73_M2}
GFR SERPL CREATININE-BSD FRML MDRD: ABNORMAL ML/MIN/{1.73_M2}
GLUCOSE BLD-MCNC: 147 MG/DL (ref 70–99)
HCT VFR BLD CALC: 37 % (ref 41–53)
HDLC SERPL-MCNC: 46 MG/DL
HEMOGLOBIN: 12.3 G/DL (ref 13.5–17.5)
IMMATURE GRANULOCYTES: ABNORMAL %
LDL CHOLESTEROL: 54 MG/DL (ref 0–130)
LYMPHOCYTES # BLD: 19 % (ref 13–44)
MAGNESIUM: 2.2 MG/DL (ref 1.6–2.6)
MCH RBC QN AUTO: 26.9 PG (ref 26–34)
MCHC RBC AUTO-ENTMCNC: 33.3 G/DL (ref 31–37)
MCV RBC AUTO: 81 FL (ref 80–100)
MONOCYTES # BLD: 10 % (ref 5–9)
NRBC AUTOMATED: ABNORMAL PER 100 WBC
PATIENT FASTING?: YES
PDW BLD-RTO: 16.9 % (ref 12.1–15.2)
PLATELET # BLD: 311 K/UL (ref 140–450)
PLATELET ESTIMATE: ABNORMAL
PMV BLD AUTO: ABNORMAL FL (ref 6–12)
POTASSIUM SERPL-SCNC: 4.2 MMOL/L (ref 3.7–5.3)
RBC # BLD: 4.57 M/UL (ref 4.5–5.9)
RBC # BLD: ABNORMAL 10*6/UL
SEG NEUTROPHILS: 69 % (ref 39–75)
SEGMENTED NEUTROPHILS ABSOLUTE COUNT: 4.9 K/UL (ref 2.1–6.5)
SODIUM BLD-SCNC: 138 MMOL/L (ref 135–144)
TOTAL PROTEIN: 7.3 G/DL (ref 6.4–8.3)
TRIGL SERPL-MCNC: 113 MG/DL
TSH SERPL DL<=0.05 MIU/L-ACNC: 1.92 MIU/L (ref 0.3–5)
VITAMIN D 25-HYDROXY: 39.3 NG/ML (ref 30–100)
VLDLC SERPL CALC-MCNC: NORMAL MG/DL (ref 1–30)
WBC # BLD: 7 K/UL (ref 3.5–11)
WBC # BLD: ABNORMAL 10*3/UL

## 2019-09-09 PROCEDURE — 82306 VITAMIN D 25 HYDROXY: CPT

## 2019-09-09 PROCEDURE — 84443 ASSAY THYROID STIM HORMONE: CPT

## 2019-09-09 PROCEDURE — 93005 ELECTROCARDIOGRAM TRACING: CPT

## 2019-09-09 PROCEDURE — G8427 DOCREV CUR MEDS BY ELIG CLIN: HCPCS | Performed by: INTERNAL MEDICINE

## 2019-09-09 PROCEDURE — 4040F PNEUMOC VAC/ADMIN/RCVD: CPT | Performed by: INTERNAL MEDICINE

## 2019-09-09 PROCEDURE — 99214 OFFICE O/P EST MOD 30 MIN: CPT | Performed by: INTERNAL MEDICINE

## 2019-09-09 PROCEDURE — 80061 LIPID PANEL: CPT

## 2019-09-09 PROCEDURE — 1123F ACP DISCUSS/DSCN MKR DOCD: CPT | Performed by: INTERNAL MEDICINE

## 2019-09-09 PROCEDURE — 85025 COMPLETE CBC W/AUTO DIFF WBC: CPT

## 2019-09-09 PROCEDURE — 36415 COLL VENOUS BLD VENIPUNCTURE: CPT

## 2019-09-09 PROCEDURE — G8420 CALC BMI NORM PARAMETERS: HCPCS | Performed by: INTERNAL MEDICINE

## 2019-09-09 PROCEDURE — G8598 ASA/ANTIPLAT THER USED: HCPCS | Performed by: INTERNAL MEDICINE

## 2019-09-09 PROCEDURE — 83735 ASSAY OF MAGNESIUM: CPT

## 2019-09-09 PROCEDURE — 71046 X-RAY EXAM CHEST 2 VIEWS: CPT

## 2019-09-09 PROCEDURE — 80053 COMPREHEN METABOLIC PANEL: CPT

## 2019-09-09 PROCEDURE — 1036F TOBACCO NON-USER: CPT | Performed by: INTERNAL MEDICINE

## 2019-09-09 RX ORDER — LORAZEPAM 0.5 MG/1
1 TABLET ORAL DAILY
COMMUNITY
End: 2019-09-09 | Stop reason: ALTCHOICE

## 2019-09-09 RX ORDER — PROPRANOLOL HYDROCHLORIDE 60 MG/1
60 TABLET ORAL 3 TIMES DAILY
Qty: 90 TABLET | Refills: 11 | Status: SHIPPED | OUTPATIENT
Start: 2019-09-09 | End: 2020-03-03

## 2019-09-09 NOTE — LETTER
[address] [facility] CARDIOLOGY SPECIALIST  [address]  [facility] [address]  Dept: [phone]  Dept Fax: [phone]          9/9/19      To Whom it May Concern: In my opinion, from a cardiology standpoint, Shauna Patel is cleared for surgery. Ok to hold plavix 5 days prior to procedure. If you have any questions, please feel free to call me at [phone].     Sincerely,          Javid Guo MD

## 2019-09-10 LAB
EKG ATRIAL RATE: 78 BPM
EKG P AXIS: 61 DEGREES
EKG P-R INTERVAL: 188 MS
EKG Q-T INTERVAL: 394 MS
EKG QRS DURATION: 132 MS
EKG QTC CALCULATION (BAZETT): 449 MS
EKG R AXIS: -50 DEGREES
EKG T AXIS: 38 DEGREES
EKG VENTRICULAR RATE: 78 BPM

## 2019-09-10 PROCEDURE — 93010 ELECTROCARDIOGRAM REPORT: CPT | Performed by: INTERNAL MEDICINE

## 2019-09-16 ENCOUNTER — HOSPITAL ENCOUNTER (OUTPATIENT)
Dept: PHYSICAL THERAPY | Age: 84
Setting detail: THERAPIES SERIES
Discharge: HOME | End: 2019-09-16
Payer: MEDICARE

## 2019-09-16 PROCEDURE — 97163 PT EVAL HIGH COMPLEX 45 MIN: CPT

## 2019-09-16 PROCEDURE — 97140 MANUAL THERAPY 1/> REGIONS: CPT

## 2019-09-16 ASSESSMENT — PAIN DESCRIPTION - ORIENTATION: ORIENTATION: RIGHT;LEFT

## 2019-09-16 ASSESSMENT — PAIN DESCRIPTION - LOCATION: LOCATION: NECK;ARM;BACK

## 2019-09-16 NOTE — PRE-CERTIFICATION NOTE
Medicare Cap     [] Physical Therapy  [] Speech Therapy  [] Occupational therapy    *PT and Speech caps combine      $1940 Cap limit < kx modifier needed < $5990 requires pre-cert     Patient Name: Angelina Mayo  YOB: 1935     Date of [address] Name $$$ charge Daily Charge YTD   Total $   9/16/19 ean Silveira 83.06, 27.43 110.49 110.49

## 2019-09-16 NOTE — PROGRESS NOTES
Decreased ADL status, Decreased ROM, Decreased strength  Assessment: Patient has forward head posture and chronic neck pain aggrivated recently from open heart surgery. 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 Educated patient on and issued HEP handout. Completed manual therpay per Doc Flow. Plan for therex, posture education, manual therapy.   Prognosis: Good  Decision Making: High Complexity  History: complex  Exam: Neck Disability Index 32/50    Clinical Presentation:  Evolving  The Following Comorbities will impact the patients progression and Plan of Care:   Cardiac Disease/Pacemaker and Previous Orthopedic Injury/Surgery       Education: On POC and HEP handout        Goals  Short term goals  Time Frame for Short term goals: 6  Short term goal 1: Patient to be independent with HEP  Short term goal 2: Patient to demonstrate understanding of correct posture following education  Short term goal 3: Improve cervical AROM B sidebend 25 degrees    Long term goals  Time Frame for Long term goals : 10  Long term goal 1: Decrease neck pain 2/10 x 3 days  Long term goal 2: Improve functional activities with score < 25/50 on Neck Disability Index    Patient's Goal:    Patient wants less neck pain during activities    Timed Code Treatment Minutes: 15 Minutes  Total Treatment Time: 45     Time In: 14:45  Time Out: 15:30    Karen Painting, PT Date: 9/16/2019

## 2019-09-17 ENCOUNTER — HOSPITAL ENCOUNTER (OUTPATIENT)
Age: 84
Setting detail: SPECIMEN
Discharge: HOME | End: 2019-09-17
Payer: MEDICARE

## 2019-09-17 PROCEDURE — 88305 TISSUE EXAM BY PATHOLOGIST: CPT

## 2019-09-19 LAB — DERMATOLOGY PATHOLOGY REPORT: NORMAL

## 2019-09-24 ENCOUNTER — HOSPITAL ENCOUNTER (OUTPATIENT)
Dept: PHYSICAL THERAPY | Age: 84
Setting detail: THERAPIES SERIES
Discharge: HOME | End: 2019-09-24
Payer: MEDICARE

## 2019-09-24 PROCEDURE — 97140 MANUAL THERAPY 1/> REGIONS: CPT

## 2019-09-24 PROCEDURE — 97110 THERAPEUTIC EXERCISES: CPT

## 2019-09-24 ASSESSMENT — PAIN SCALES - GENERAL: PAINLEVEL_OUTOF10: 2

## 2019-09-24 NOTE — PRE-CERTIFICATION NOTE
Medicare Cap     [] Physical Therapy  [] Speech Therapy  [] Occupational therapy    *PT and Speech caps combine      $1940 Cap limit < kx modifier needed < $1422 requires pre-cert     Patient Name: Ori Jenkins: 1935     Date of [address] Name $$$ charge Daily Charge YTD   Total $   9/16/19 Jordana, man 83.06, 27.43 110.49 110.49   9/24/19 Ex x 2, man 30.16 x 2, 27.43 87.75 198.24

## 2019-09-26 ENCOUNTER — HOSPITAL ENCOUNTER (OUTPATIENT)
Dept: PHYSICAL THERAPY | Age: 84
Setting detail: THERAPIES SERIES
Discharge: HOME | End: 2019-09-26
Payer: MEDICARE

## 2019-09-26 PROCEDURE — 97110 THERAPEUTIC EXERCISES: CPT

## 2019-09-26 PROCEDURE — 97140 MANUAL THERAPY 1/> REGIONS: CPT

## 2019-09-26 ASSESSMENT — PAIN DESCRIPTION - LOCATION: LOCATION: NECK

## 2019-09-26 ASSESSMENT — PAIN SCALES - GENERAL: PAINLEVEL_OUTOF10: 2

## 2019-09-26 NOTE — PRE-CERTIFICATION NOTE
Medicare Cap     [] Physical Therapy  [] Speech Therapy  [] Occupational therapy    *PT and Speech caps combine      $1940 Cap limit < kx modifier needed < $1775 requires pre-cert     Patient Name: Bria Conrad: 1935     Date of [address] Name $$$ charge Daily Charge YTD   Total $   9/16/19 Jordana, man 83.06, 27.43 110.49 110.49   9/24/19 Ex x 2, man 30.16 x 2, 27.43 87.75 198.24   9/26/19 Ex x1, man x2 30.16 x 2, 27.43 87.75 285.99

## 2019-10-15 ENCOUNTER — HOSPITAL ENCOUNTER (OUTPATIENT)
Dept: MRI IMAGING | Age: 84
Discharge: HOME | End: 2019-10-17
Payer: MEDICARE

## 2019-10-15 DIAGNOSIS — M47.812 CERVICAL SPONDYLOSIS: ICD-10-CM

## 2019-10-15 PROCEDURE — 72141 MRI NECK SPINE W/O DYE: CPT

## 2019-11-14 ENCOUNTER — HOSPITAL ENCOUNTER (OUTPATIENT)
Age: 84
Discharge: HOME | End: 2019-11-16
Payer: MEDICARE

## 2019-11-14 ENCOUNTER — HOSPITAL ENCOUNTER (OUTPATIENT)
Dept: GENERAL RADIOLOGY | Age: 84
Discharge: HOME | End: 2019-11-16
Payer: MEDICARE

## 2019-11-14 DIAGNOSIS — M54.50 LOW BACK PAIN, UNSPECIFIED BACK PAIN LATERALITY, UNSPECIFIED CHRONICITY, UNSPECIFIED WHETHER SCIATICA PRESENT: ICD-10-CM

## 2019-11-14 DIAGNOSIS — M25.551 PAIN OF RIGHT HIP JOINT: ICD-10-CM

## 2019-11-14 PROCEDURE — 73502 X-RAY EXAM HIP UNI 2-3 VIEWS: CPT

## 2019-11-14 PROCEDURE — 72110 X-RAY EXAM L-2 SPINE 4/>VWS: CPT

## 2019-12-09 ENCOUNTER — HOSPITAL ENCOUNTER (OUTPATIENT)
Dept: ULTRASOUND IMAGING | Age: 84
Discharge: HOME | End: 2019-12-11
Payer: OTHER GOVERNMENT

## 2019-12-09 DIAGNOSIS — R10.9 ABDOMINAL PAIN, UNSPECIFIED ABDOMINAL LOCATION: ICD-10-CM

## 2019-12-09 DIAGNOSIS — R94.5 ABNORMAL LIVER FUNCTION: ICD-10-CM

## 2019-12-09 PROCEDURE — 76700 US EXAM ABDOM COMPLETE: CPT

## 2019-12-16 ENCOUNTER — TELEPHONE (OUTPATIENT)
Dept: CARDIOLOGY CLINIC | Age: 84
End: 2019-12-16

## 2020-01-17 ENCOUNTER — APPOINTMENT (OUTPATIENT)
Dept: GENERAL RADIOLOGY | Age: 85
End: 2020-01-17
Payer: MEDICARE

## 2020-01-17 ENCOUNTER — HOSPITAL ENCOUNTER (EMERGENCY)
Age: 85
Discharge: HOME | End: 2020-01-17
Attending: FAMILY MEDICINE
Payer: MEDICARE

## 2020-01-17 VITALS
SYSTOLIC BLOOD PRESSURE: 143 MMHG | OXYGEN SATURATION: 97 % | DIASTOLIC BLOOD PRESSURE: 95 MMHG | TEMPERATURE: 97.6 F | RESPIRATION RATE: 25 BRPM | HEART RATE: 90 BPM

## 2020-01-17 LAB
ABSOLUTE EOS #: 0.1 K/UL (ref 0–0.4)
ABSOLUTE IMMATURE GRANULOCYTE: ABNORMAL K/UL (ref 0–0.3)
ABSOLUTE LYMPH #: 2.4 K/UL (ref 1–4.8)
ABSOLUTE MONO #: 1.7 K/UL (ref 0–1)
ANION GAP SERPL CALCULATED.3IONS-SCNC: 16 MMOL/L (ref 9–17)
BASOPHILS # BLD: 0 % (ref 0–2)
BASOPHILS ABSOLUTE: 0.1 K/UL (ref 0–0.2)
BNP INTERPRETATION: NORMAL
BUN BLDV-MCNC: 23 MG/DL (ref 8–23)
BUN/CREAT BLD: 18 (ref 9–20)
CALCIUM SERPL-MCNC: 10.1 MG/DL (ref 8.6–10.4)
CHLORIDE BLD-SCNC: 102 MMOL/L (ref 98–107)
CO2: 22 MMOL/L (ref 20–31)
CREAT SERPL-MCNC: 1.28 MG/DL (ref 0.7–1.2)
DIFFERENTIAL TYPE: YES
EOSINOPHILS RELATIVE PERCENT: 1 % (ref 0–5)
GFR AFRICAN AMERICAN: >60 ML/MIN
GFR NON-AFRICAN AMERICAN: 54 ML/MIN
GFR SERPL CREATININE-BSD FRML MDRD: ABNORMAL ML/MIN/{1.73_M2}
GFR SERPL CREATININE-BSD FRML MDRD: ABNORMAL ML/MIN/{1.73_M2}
GLUCOSE BLD-MCNC: 133 MG/DL (ref 70–99)
HCT VFR BLD CALC: 44.5 % (ref 41–53)
HEMOGLOBIN: 14.9 G/DL (ref 13.5–17.5)
IMMATURE GRANULOCYTES: ABNORMAL %
LYMPHOCYTES # BLD: 16 % (ref 13–44)
MCH RBC QN AUTO: 27 PG (ref 26–34)
MCHC RBC AUTO-ENTMCNC: 33.6 G/DL (ref 31–37)
MCV RBC AUTO: 80.4 FL (ref 80–100)
MONOCYTES # BLD: 12 % (ref 5–9)
NRBC AUTOMATED: ABNORMAL PER 100 WBC
PDW BLD-RTO: 15.6 % (ref 12.1–15.2)
PLATELET # BLD: 353 K/UL (ref 140–450)
PLATELET ESTIMATE: ABNORMAL
PMV BLD AUTO: ABNORMAL FL (ref 6–12)
POTASSIUM SERPL-SCNC: 4 MMOL/L (ref 3.7–5.3)
PRO-BNP: 284 PG/ML
RBC # BLD: 5.53 M/UL (ref 4.5–5.9)
RBC # BLD: ABNORMAL 10*6/UL
SEG NEUTROPHILS: 71 % (ref 39–75)
SEGMENTED NEUTROPHILS ABSOLUTE COUNT: 10.4 K/UL (ref 2.1–6.5)
SODIUM BLD-SCNC: 140 MMOL/L (ref 135–144)
TROPONIN INTERP: NORMAL
TROPONIN INTERP: NORMAL
TROPONIN T: <0.03 NG/ML
TROPONIN T: <0.03 NG/ML
TROPONIN, HIGH SENSITIVITY: NORMAL NG/L (ref 0–22)
TROPONIN, HIGH SENSITIVITY: NORMAL NG/L (ref 0–22)
WBC # BLD: 14.6 K/UL (ref 3.5–11)
WBC # BLD: ABNORMAL 10*3/UL

## 2020-01-17 PROCEDURE — 96375 TX/PRO/DX INJ NEW DRUG ADDON: CPT

## 2020-01-17 PROCEDURE — 36415 COLL VENOUS BLD VENIPUNCTURE: CPT

## 2020-01-17 PROCEDURE — 85025 COMPLETE CBC W/AUTO DIFF WBC: CPT

## 2020-01-17 PROCEDURE — 96374 THER/PROPH/DIAG INJ IV PUSH: CPT

## 2020-01-17 PROCEDURE — 99285 EMERGENCY DEPT VISIT HI MDM: CPT

## 2020-01-17 PROCEDURE — 6360000002 HC RX W HCPCS: Performed by: FAMILY MEDICINE

## 2020-01-17 PROCEDURE — 71045 X-RAY EXAM CHEST 1 VIEW: CPT

## 2020-01-17 PROCEDURE — 93005 ELECTROCARDIOGRAM TRACING: CPT | Performed by: FAMILY MEDICINE

## 2020-01-17 PROCEDURE — 84484 ASSAY OF TROPONIN QUANT: CPT

## 2020-01-17 PROCEDURE — 80048 BASIC METABOLIC PNL TOTAL CA: CPT

## 2020-01-17 PROCEDURE — 83880 ASSAY OF NATRIURETIC PEPTIDE: CPT

## 2020-01-17 RX ORDER — ASPIRIN 81 MG/1
324 TABLET, CHEWABLE ORAL ONCE
Status: DISCONTINUED | OUTPATIENT
Start: 2020-01-17 | End: 2020-01-17

## 2020-01-17 RX ORDER — ISOSORBIDE MONONITRATE 30 MG/1
TABLET, EXTENDED RELEASE ORAL
Qty: 30 TABLET | Refills: 1 | Status: SHIPPED | OUTPATIENT
Start: 2020-01-17 | End: 2021-05-20

## 2020-01-17 RX ORDER — MORPHINE SULFATE 4 MG/ML
4 INJECTION, SOLUTION INTRAMUSCULAR; INTRAVENOUS ONCE
Status: COMPLETED | OUTPATIENT
Start: 2020-01-17 | End: 2020-01-17

## 2020-01-17 RX ORDER — ONDANSETRON 2 MG/ML
4 INJECTION INTRAMUSCULAR; INTRAVENOUS ONCE
Status: COMPLETED | OUTPATIENT
Start: 2020-01-17 | End: 2020-01-17

## 2020-01-17 RX ADMIN — ONDANSETRON 4 MG: 2 INJECTION INTRAMUSCULAR; INTRAVENOUS at 14:16

## 2020-01-17 RX ADMIN — MORPHINE SULFATE 4 MG: 4 INJECTION, SOLUTION INTRAMUSCULAR; INTRAVENOUS at 14:16

## 2020-01-17 ASSESSMENT — PAIN DESCRIPTION - LOCATION
LOCATION: CHEST
LOCATION: CHEST;BACK;ARM

## 2020-01-17 ASSESSMENT — PAIN SCALES - GENERAL
PAINLEVEL_OUTOF10: 3
PAINLEVEL_OUTOF10: 8
PAINLEVEL_OUTOF10: 8

## 2020-01-17 ASSESSMENT — PAIN DESCRIPTION - ORIENTATION
ORIENTATION: MID;LEFT;RIGHT
ORIENTATION: MID

## 2020-01-17 ASSESSMENT — PAIN DESCRIPTION - PAIN TYPE
TYPE: ACUTE PAIN
TYPE: ACUTE PAIN

## 2020-01-18 LAB
EKG ATRIAL RATE: 93 BPM
EKG ATRIAL RATE: 98 BPM
EKG P AXIS: 63 DEGREES
EKG P AXIS: 77 DEGREES
EKG P-R INTERVAL: 170 MS
EKG P-R INTERVAL: 182 MS
EKG Q-T INTERVAL: 370 MS
EKG Q-T INTERVAL: 390 MS
EKG QRS DURATION: 122 MS
EKG QRS DURATION: 134 MS
EKG QTC CALCULATION (BAZETT): 472 MS
EKG QTC CALCULATION (BAZETT): 484 MS
EKG R AXIS: -55 DEGREES
EKG R AXIS: -57 DEGREES
EKG T AXIS: 48 DEGREES
EKG T AXIS: 64 DEGREES
EKG VENTRICULAR RATE: 93 BPM
EKG VENTRICULAR RATE: 98 BPM

## 2020-01-18 PROCEDURE — 93010 ELECTROCARDIOGRAM REPORT: CPT | Performed by: INTERNAL MEDICINE

## 2020-01-18 ASSESSMENT — HEART SCORE: ECG: 1

## 2020-01-18 NOTE — ED PROVIDER NOTES
[address] [facility]  eMERGENCY dEPARTMENT eNCOUnter          [address] [facility]       Chief Complaint   Patient presents with    Chest Pain     CHEST PAIN STARTED 115PM TODAY AFTER GETTING HOME FROM THE GROCERY STORE. Nurses Notes reviewed and I agree except as noted in the HPI. HISTORY OF PRESENT ILLNESS    Patricia Ortiz is a 82 y.o. male who presents the emergency room home, patient complaining of chest pain, onset 1300 hrs., took 2 nitroglycerin, 1/13/2025 and again another 1330 hrs. without relief. Patient describes an 8 out of 10 pain pointing in his upper sternal, feels that there is radiation into his back and both his upper arms, denies any significant shortness of breath. Patient states his been off his Plavix for the past 5 days due to upcoming procedure. EMS was called, states his initial systolic blood pressure was 100, twelve-lead showing atrial flutter, patient was given aspirin 324 mg in route to the emergency room. REVIEW OF SYSTEMS     Review of Systems   Cardiovascular: Positive for chest pain. Negative for palpitations and leg swelling. All other systems reviewed and are negative. PAST MEDICAL HISTORY    has a past medical history of Anginal pain ([address] CAD (coronary artery disease), Diverticula of colon, Hyperlipidemia, Hypertension, and Kidney stone. SURGICAL HISTORY      has a past surgical history that includes Cardiac surgery; Stomach surgery; Cardiac catheterization (Left, 12/24/14); Cardiac catheterization (5/4/2007); Cardiac catheterization (5/4/07 con't); Cardiac catheterization (6/12/2008); Percutaneous Transluminal Coronary Angio (3/9/2011); joint replacement (Right, 1998 and 2012 ); joint replacement (Right, 2012); Cholecystectomy; Dilatation, esophagus; Abdomen surgery; colectomy; hernia repair (Right); eye surgery (Bilateral); Appendectomy; Colonoscopy;  Colonoscopy (03-); angioplasty (06/29/2016); pr esophagogastroduodenoscopy transoral diagnostic (N/A, 2017); Upper gastrointestinal endoscopy; and angioplasty (10/02/2018). CURRENT MEDICATIONS       Discharge Medication List as of 2020  4:40 PM      CONTINUE these medications which have NOT CHANGED    Details   propranolol (INDERAL) 60 MG tablet Take 1 tablet by mouth 3 times daily, Disp-90 tablet, R-11Normal      rosuvastatin (CRESTOR) 40 MG tablet Take 1 tablet by mouth every evening, Disp-30 tablet, R-11Print      ezetimibe (ZETIA) 10 MG tablet Take 1 tablet by mouth daily, Disp-30 tablet, R-3Print      aspirin 81 MG tablet Take 81 mg by mouth daily Historical Med      docusate sodium (COLACE) 100 MG capsule Take 1 capsule by mouth 3 times daily as needed for Constipation, Disp-45 capsule, R-0Normal      traMADol (ULTRAM) 50 MG tablet Take 50 mg by mouth 3 times daily as needed for Pain  Do not take in close timeframe with Clonazepam .Historical Med      gabapentin (NEURONTIN) 400 MG capsule Take 400 mg by mouth every eveningHistorical Med      venlafaxine (EFFEXOR) 100 MG tablet Take 100 mg by mouth every evening Historical Med      omeprazole (PRILOSEC) 20 MG capsule Take 20 mg by mouth Daily Historical Med      vitamin D (CHOLECALCIFEROL) 1000 UNIT TABS tablet Take 3,000 Units by mouth daily Historical Med      clonazePAM (KLONOPIN) 0.5 MG tablet Take 0.5 mg by mouth 2 times daily  . Historical Med      nitroGLYCERIN (NITROSTAT) 0.4 MG SL tablet Place 0.4 mg under the tongue every 5 minutes as needed. ondansetron (ZOFRAN) 4 MG tablet Take 1 tablet by mouth every 6 hours as needed for Nausea, Disp-30 tablet, R-0Normal      clopidogrel (PLAVIX) 75 MG tablet Take 75 mg by mouth daily. ALLERGIES     is allergic to pcn [penicillins]; penicillamine; and ranolazine. FAMILY HISTORY     He indicated that his mother is . He indicated that his father is . He indicated that all of his three sisters are alive.  He indicated that four of his five brothers are alive. He indicated that his maternal grandmother is . He indicated that his maternal grandfather is . He indicated that his paternal grandmother is . He indicated that his paternal grandfather is . He indicated that all of his three sons are alive. family history includes Heart Attack in his brother and father. SOCIAL HISTORY      reports that he quit smoking about 52 years ago. He smoked 10.00 packs per day. He has never used smokeless tobacco. He reports that he does not drink alcohol or use drugs. PHYSICAL EXAM     INITIAL VITALS:  temperature is 97.6 °F (36.4 °C). His blood pressure is 143/95 (abnormal) and his pulse is 90. His respiration is 25 and oxygen saturation is 97%. Physical Exam   Constitutional: Patient is oriented to person, place, and time. Patient appears well-developed and well-nourished. Patient is active and cooperative. HENT:   Head: Normocephalic and atraumatic. Head is without contusion. Right Ear: Hearing and external ear normal. No drainage. Left Ear: Hearing and external ear normal. No drainage. Nose: Nose normal. No nasal deformity. No epistaxis. Mouth/Throat: Mucous membranes are not dry. Eyes: EOMI. Conjunctivae, sclera, and lids are normal. Right eye exhibits no discharge. Left eye exhibits no discharge. Neck: Full passive range of motion without pain and phonation normal.  Negative JVD  Cardiovascular:   Normal rate, regular rhythm and intact distal pulses. No murmurs, rubs, or gallops. No edema. Pulses: Radial pulse is 2+   Pulmonary/Chest: Effort normal.   No tachypnea and no bradypnea. No wheezes, rhonchi, or rales. Abdominal: Soft. Patient without distension or tenderness  Musculoskeletal:   Negative acute trauma or deformity,  apparent full range of motion and normal strength all extremities appropriate to age. Neurological: Patient is alert and oriented to person, place, and time.  patient displays no tremor. Patient displays no seizure activity. Skin: Skin is warm and dry. Patient is not diaphoretic. Psychiatric: Patient has a normal mood and affect. Patient speech is normal and behavior is normal. Cognition and memory are normal.      DIFFERENTIAL DIAGNOSIS:       DIAGNOSTIC RESULTS     EKG: All EKG's are interpreted by the Emergency Department Physician who either signs or Co-signs this chart in the absence of a cardiologist.  EKG    The patient had an EKG which is interpreted by me in the absence of a Cardiologist.   [] Without comparison to previous. [x] With comparison to a previous EKG Dated 9/9/2019    EMS EKG -sinus tachycardia rate 110, left axis, normal intervals    EKG @ 1402 hrs -sinus rhythm rate 98, left axis, noted PVCs, noted iRBBB/LAFB, as compared to prior EKG no significant changes morphology    EKG @ 1604 hrs -sinus rhythm rate 93, left axis, noted RBBB/LAFB       RADIOLOGY: non-plain film images(s) such as CT, Ultrasound and MRI are read by the radiologist.  XR CHEST PORTABLE   Final Result      No acute cardiopulmonary process.                     LABS:   Labs Reviewed   CBC WITH AUTO DIFFERENTIAL - Abnormal; Notable for the following components:       Result Value    WBC 14.6 (*)     RDW 15.6 (*)     Monocytes 12 (*)     Segs Absolute 10.40 (*)     Absolute Mono # 1.70 (*)     All other components within normal limits   BASIC METABOLIC PANEL W/ REFLEX TO MG FOR LOW K - Abnormal; Notable for the following components:    Glucose 133 (*)     CREATININE 1.28 (*)     GFR Non- 54 (*)     All other components within normal limits   TROPONIN   BRAIN NATRIURETIC PEPTIDE   TROPONIN       EMERGENCY DEPARTMENT COURSE:   Vitals:    Vitals:    01/17/20 1548 01/17/20 1604 01/17/20 1621 01/17/20 1633   BP: 129/85 121/69 (!) 141/77 (!) 143/95   Pulse: 92 89 95 90   Resp: 21 17 21 25   Temp:       TempSrc:       SpO2: 98% 97% 98% 97%     Patient history and physical exam taken at

## 2020-03-03 ENCOUNTER — HOSPITAL ENCOUNTER (OUTPATIENT)
Age: 85
Discharge: HOME | End: 2020-03-03
Payer: MEDICARE

## 2020-03-03 ENCOUNTER — OFFICE VISIT (OUTPATIENT)
Dept: CARDIOLOGY CLINIC | Age: 85
End: 2020-03-03
Payer: MEDICARE

## 2020-03-03 VITALS
HEART RATE: 110 BPM | WEIGHT: 161 LBS | SYSTOLIC BLOOD PRESSURE: 150 MMHG | OXYGEN SATURATION: 96 % | BODY MASS INDEX: 23.1 KG/M2 | DIASTOLIC BLOOD PRESSURE: 84 MMHG

## 2020-03-03 LAB — TSH SERPL DL<=0.05 MIU/L-ACNC: 1.61 MIU/L (ref 0.3–5)

## 2020-03-03 PROCEDURE — G8484 FLU IMMUNIZE NO ADMIN: HCPCS | Performed by: INTERNAL MEDICINE

## 2020-03-03 PROCEDURE — 1036F TOBACCO NON-USER: CPT | Performed by: INTERNAL MEDICINE

## 2020-03-03 PROCEDURE — 36415 COLL VENOUS BLD VENIPUNCTURE: CPT

## 2020-03-03 PROCEDURE — G8427 DOCREV CUR MEDS BY ELIG CLIN: HCPCS | Performed by: INTERNAL MEDICINE

## 2020-03-03 PROCEDURE — 1123F ACP DISCUSS/DSCN MKR DOCD: CPT | Performed by: INTERNAL MEDICINE

## 2020-03-03 PROCEDURE — 99214 OFFICE O/P EST MOD 30 MIN: CPT | Performed by: INTERNAL MEDICINE

## 2020-03-03 PROCEDURE — 84443 ASSAY THYROID STIM HORMONE: CPT

## 2020-03-03 PROCEDURE — 4040F PNEUMOC VAC/ADMIN/RCVD: CPT | Performed by: INTERNAL MEDICINE

## 2020-03-03 PROCEDURE — G8420 CALC BMI NORM PARAMETERS: HCPCS | Performed by: INTERNAL MEDICINE

## 2020-03-03 RX ORDER — PROPRANOLOL HYDROCHLORIDE 80 MG/1
80 TABLET ORAL 3 TIMES DAILY
Qty: 90 TABLET | Refills: 11 | Status: SHIPPED | OUTPATIENT
Start: 2020-03-03 | End: 2022-01-11

## 2020-03-06 NOTE — PROGRESS NOTES
Ov Dr Torre Dose 5 mth follow up   No chest pain   Some sob Gradually came about. No ankle edema . Was seen in ED on 1/17   Chest pain was a sharp pain   Given morphine went away. Wife having some memory problems  And has a neighbor that always hugging   Her doesn't leave her alone. He even showed up at nursing home   After watching Augustina Lobe leave. EKG done in room   Bedside echo done,  Increase inderal to 80mg TID  Will do TSH today. bp in 2 weeks   Will see in 6 mth.
Negative. Diabetes:  Negative. Smoking:  Negative. MEDICATIONS AT HOME:  He is currently on aspirin 81 mg daily, Klonopin 0.5 mg b.i.d., Plavix 75 mg daily, Colace daily, Zetia 10 mg daily, Neurontin 400 mg daily, Imdur 30 mg daily, Prilosec 20 mg daily, Zofran 4 mg q.6 hours p.r.n., Inderal 60 mg t.i.d., Crestor 40 mg daily, Ultram p.r.n., Effexor 100 mg daily, vitamin D 3000 units daily. PAST MEDICAL HISTORY:  1. Cardiac as above. 2.  Total of 15 stents, with open heart surgery on 2019.  3.  Right knee surgery on 2013. 4.  Hypertension. 5.  Hyperlipidemia. 6.  Right knee replacement. 7.  COPD. 8.  Ureteral stones. 9.  Bilateral inguinal hernias. 10.  Large ventral hernia, painful. 11.  Cervical disk disease. FAMILY HISTORY:  Father  of MI at 76. Brother  of MI at 54. Sister  at 67 of MI.    SOCIAL HISTORY:  He is 82years old, . Three sons, one is a  in [place], another son has traumatic stress syndrome along with parkinsonism, and one son is an advisor to a company. Mr. Alek Benton does not smoke or drink alcohol. His wife uses a walker now and has marked difficulty with any walking. She has fallen multiple times. In addition, he has a neighbor, who is 80years old, who is quite bothersome, coming over and using vulgar language. He also believes that he is a [place]. .. Mr. Alek Benton will be calling the police next time he shows up at his door. .. REVIEW OF SYSTEMS:  Cardiac as above. Other systems reviewed including constitutional, eyes, ears, nose and throat, cardiovascular, respiratory, GI, , musculoskeletal, integumentary, neurologic, endocrine, hematologic and allergic/immunologic are negative except for what is described above. No weight loss or weight gain. No change in bowel habits, no blood in stools. No fevers, sweats or chills.     PHYSICAL EXAMINATION:  VITAL SIGNS:  His blood pressure was 150/84 with a heart rate of 110 and

## 2020-03-16 ENCOUNTER — OFFICE VISIT (OUTPATIENT)
Dept: CARDIOLOGY CLINIC | Age: 85
End: 2020-03-16
Payer: MEDICARE

## 2020-03-16 VITALS
OXYGEN SATURATION: 97 % | BODY MASS INDEX: 23.39 KG/M2 | DIASTOLIC BLOOD PRESSURE: 84 MMHG | SYSTOLIC BLOOD PRESSURE: 142 MMHG | HEART RATE: 70 BPM | WEIGHT: 163 LBS

## 2020-03-16 PROCEDURE — 99211 OFF/OP EST MAY X REQ PHY/QHP: CPT | Performed by: INTERNAL MEDICINE

## 2020-03-16 PROCEDURE — G8420 CALC BMI NORM PARAMETERS: HCPCS | Performed by: INTERNAL MEDICINE

## 2020-03-16 PROCEDURE — G8427 DOCREV CUR MEDS BY ELIG CLIN: HCPCS | Performed by: INTERNAL MEDICINE

## 2020-05-11 ENCOUNTER — HOSPITAL ENCOUNTER (EMERGENCY)
Age: 85
Discharge: ANOTHER HOSPITAL | End: 2020-05-11
Attending: EMERGENCY MEDICINE
Payer: MEDICARE

## 2020-05-11 ENCOUNTER — APPOINTMENT (OUTPATIENT)
Dept: CT IMAGING | Age: 85
End: 2020-05-11
Payer: MEDICARE

## 2020-05-11 VITALS
TEMPERATURE: 98 F | DIASTOLIC BLOOD PRESSURE: 94 MMHG | WEIGHT: 160 LBS | SYSTOLIC BLOOD PRESSURE: 154 MMHG | HEIGHT: 70 IN | BODY MASS INDEX: 22.9 KG/M2 | HEART RATE: 65 BPM | OXYGEN SATURATION: 100 % | RESPIRATION RATE: 15 BRPM

## 2020-05-11 LAB
-: ABNORMAL
ABSOLUTE EOS #: 0.3 K/UL (ref 0–0.4)
ABSOLUTE IMMATURE GRANULOCYTE: ABNORMAL K/UL (ref 0–0.3)
ABSOLUTE LYMPH #: 1.2 K/UL (ref 1–4.8)
ABSOLUTE MONO #: 0.7 K/UL (ref 0–1)
ALBUMIN SERPL-MCNC: 4.6 G/DL (ref 3.5–5.2)
ALBUMIN/GLOBULIN RATIO: ABNORMAL (ref 1–2.5)
ALP BLD-CCNC: 79 U/L (ref 40–129)
ALT SERPL-CCNC: 17 U/L (ref 5–41)
AMORPHOUS: ABNORMAL
ANION GAP SERPL CALCULATED.3IONS-SCNC: 11 MMOL/L (ref 9–17)
AST SERPL-CCNC: 21 U/L
BACTERIA: ABNORMAL
BASOPHILS # BLD: 1 % (ref 0–2)
BASOPHILS ABSOLUTE: 0 K/UL (ref 0–0.2)
BILIRUB SERPL-MCNC: 0.52 MG/DL (ref 0.3–1.2)
BILIRUBIN URINE: NEGATIVE
BUN BLDV-MCNC: 16 MG/DL (ref 8–23)
BUN/CREAT BLD: ABNORMAL (ref 9–20)
CALCIUM SERPL-MCNC: 9.8 MG/DL (ref 8.6–10.4)
CASTS UA: ABNORMAL /LPF
CHLORIDE BLD-SCNC: 102 MMOL/L (ref 98–107)
CO2: 21 MMOL/L (ref 20–31)
COLOR: YELLOW
COMMENT UA: ABNORMAL
CREAT SERPL-MCNC: 1.1 MG/DL (ref 0.7–1.2)
CRYSTALS, UA: ABNORMAL /HPF
DIFFERENTIAL TYPE: YES
EOSINOPHILS RELATIVE PERCENT: 4 % (ref 0–5)
EPITHELIAL CELLS UA: ABNORMAL /HPF
GFR AFRICAN AMERICAN: >60 ML/MIN
GFR NON-AFRICAN AMERICAN: >60 ML/MIN
GFR SERPL CREATININE-BSD FRML MDRD: ABNORMAL ML/MIN/{1.73_M2}
GFR SERPL CREATININE-BSD FRML MDRD: ABNORMAL ML/MIN/{1.73_M2}
GLUCOSE BLD-MCNC: 154 MG/DL (ref 65–99)
GLUCOSE BLD-MCNC: 203 MG/DL (ref 70–99)
GLUCOSE URINE: NEGATIVE
HCT VFR BLD CALC: 44.4 % (ref 41–53)
HEMOGLOBIN: 14.8 G/DL (ref 13.5–17.5)
IMMATURE GRANULOCYTES: ABNORMAL %
KETONES, URINE: NEGATIVE
LEUKOCYTE ESTERASE, URINE: NEGATIVE
LYMPHOCYTES # BLD: 18 % (ref 13–44)
MCH RBC QN AUTO: 27.4 PG (ref 26–34)
MCHC RBC AUTO-ENTMCNC: 33.4 G/DL (ref 31–37)
MCV RBC AUTO: 82 FL (ref 80–100)
MONOCYTES # BLD: 11 % (ref 5–9)
MUCUS: ABNORMAL
NITRITE, URINE: NEGATIVE
NRBC AUTOMATED: ABNORMAL PER 100 WBC
OTHER OBSERVATIONS UA: ABNORMAL
PDW BLD-RTO: 14.7 % (ref 12.1–15.2)
PH UA: 5 (ref 5–8)
PLATELET # BLD: 274 K/UL (ref 140–450)
PLATELET ESTIMATE: ABNORMAL
PMV BLD AUTO: ABNORMAL FL (ref 6–12)
POTASSIUM SERPL-SCNC: 4.3 MMOL/L (ref 3.7–5.3)
PROTEIN UA: NEGATIVE
RBC # BLD: 5.41 M/UL (ref 4.5–5.9)
RBC # BLD: ABNORMAL 10*6/UL
RBC UA: ABNORMAL /HPF (ref 0–2)
RENAL EPITHELIAL, UA: ABNORMAL /HPF
SEG NEUTROPHILS: 66 % (ref 39–75)
SEGMENTED NEUTROPHILS ABSOLUTE COUNT: 4.5 K/UL (ref 2.1–6.5)
SODIUM BLD-SCNC: 134 MMOL/L (ref 135–144)
SPECIFIC GRAVITY UA: 1.02 (ref 1–1.03)
TOTAL PROTEIN: 7.6 G/DL (ref 6.4–8.3)
TRICHOMONAS: ABNORMAL
TROPONIN INTERP: NORMAL
TROPONIN T: <0.03 NG/ML
TROPONIN, HIGH SENSITIVITY: NORMAL NG/L (ref 0–22)
TURBIDITY: CLEAR
URINE HGB: ABNORMAL
UROBILINOGEN, URINE: NORMAL
WBC # BLD: 6.8 K/UL (ref 3.5–11)
WBC # BLD: ABNORMAL 10*3/UL
WBC UA: ABNORMAL /HPF
YEAST: ABNORMAL

## 2020-05-11 PROCEDURE — 93005 ELECTROCARDIOGRAM TRACING: CPT | Performed by: EMERGENCY MEDICINE

## 2020-05-11 PROCEDURE — 81001 URINALYSIS AUTO W/SCOPE: CPT

## 2020-05-11 PROCEDURE — 6360000002 HC RX W HCPCS: Performed by: EMERGENCY MEDICINE

## 2020-05-11 PROCEDURE — 85025 COMPLETE CBC W/AUTO DIFF WBC: CPT

## 2020-05-11 PROCEDURE — 99285 EMERGENCY DEPT VISIT HI MDM: CPT

## 2020-05-11 PROCEDURE — 72131 CT LUMBAR SPINE W/O DYE: CPT

## 2020-05-11 PROCEDURE — 96375 TX/PRO/DX INJ NEW DRUG ADDON: CPT

## 2020-05-11 PROCEDURE — 82947 ASSAY GLUCOSE BLOOD QUANT: CPT

## 2020-05-11 PROCEDURE — 80053 COMPREHEN METABOLIC PANEL: CPT

## 2020-05-11 PROCEDURE — 36415 COLL VENOUS BLD VENIPUNCTURE: CPT

## 2020-05-11 PROCEDURE — 84484 ASSAY OF TROPONIN QUANT: CPT

## 2020-05-11 PROCEDURE — 96374 THER/PROPH/DIAG INJ IV PUSH: CPT

## 2020-05-11 PROCEDURE — 70450 CT HEAD/BRAIN W/O DYE: CPT

## 2020-05-11 PROCEDURE — 96376 TX/PRO/DX INJ SAME DRUG ADON: CPT

## 2020-05-11 RX ORDER — ONDANSETRON 2 MG/ML
4 INJECTION INTRAMUSCULAR; INTRAVENOUS ONCE
Status: COMPLETED | OUTPATIENT
Start: 2020-05-11 | End: 2020-05-11

## 2020-05-11 RX ORDER — ATORVASTATIN CALCIUM 80 MG/1
80 TABLET, FILM COATED ORAL DAILY
COMMUNITY

## 2020-05-11 RX ORDER — FENTANYL CITRATE 50 UG/ML
50 INJECTION, SOLUTION INTRAMUSCULAR; INTRAVENOUS ONCE
Status: COMPLETED | OUTPATIENT
Start: 2020-05-11 | End: 2020-05-11

## 2020-05-11 RX ORDER — TAMSULOSIN HYDROCHLORIDE 0.4 MG/1
0.4 CAPSULE ORAL DAILY
COMMUNITY
End: 2020-06-15

## 2020-05-11 RX ORDER — METOPROLOL SUCCINATE 25 MG/1
25 TABLET, EXTENDED RELEASE ORAL DAILY
COMMUNITY
End: 2021-09-23

## 2020-05-11 RX ORDER — LISINOPRIL 5 MG/1
5 TABLET ORAL DAILY
COMMUNITY
End: 2021-10-11 | Stop reason: ALTCHOICE

## 2020-05-11 RX ORDER — NAPROXEN 375 MG/1
375 TABLET ORAL 2 TIMES DAILY PRN
COMMUNITY
End: 2022-01-06 | Stop reason: ALTCHOICE

## 2020-05-11 RX ADMIN — FENTANYL CITRATE 50 MCG: 50 INJECTION, SOLUTION INTRAMUSCULAR; INTRAVENOUS at 16:00

## 2020-05-11 RX ADMIN — FENTANYL CITRATE 50 MCG: 50 INJECTION, SOLUTION INTRAMUSCULAR; INTRAVENOUS at 11:55

## 2020-05-11 RX ADMIN — ONDANSETRON 4 MG: 2 INJECTION INTRAMUSCULAR; INTRAVENOUS at 16:00

## 2020-05-11 ASSESSMENT — PAIN SCALES - GENERAL
PAINLEVEL_OUTOF10: 10
PAINLEVEL_OUTOF10: 10
PAINLEVEL_OUTOF10: 4

## 2020-05-11 ASSESSMENT — PAIN SCALES - WONG BAKER: WONGBAKER_NUMERICALRESPONSE: 0

## 2020-05-11 ASSESSMENT — PAIN DESCRIPTION - ORIENTATION
ORIENTATION: RIGHT
ORIENTATION: RIGHT

## 2020-05-11 ASSESSMENT — PAIN DESCRIPTION - LOCATION
LOCATION: HIP
LOCATION: HIP

## 2020-05-11 ASSESSMENT — PAIN DESCRIPTION - FREQUENCY: FREQUENCY: CONTINUOUS

## 2020-05-11 ASSESSMENT — PAIN DESCRIPTION - DESCRIPTORS: DESCRIPTORS: ACHING

## 2020-05-11 ASSESSMENT — PAIN DESCRIPTION - PAIN TYPE
TYPE: ACUTE PAIN
TYPE: ACUTE PAIN

## 2020-05-11 NOTE — ED NOTES
Pt sitting up in bed eating lunch at this time. Denies any further needs at this time.      Shilo Lim RN  05/11/20 2163

## 2020-05-11 NOTE — ED PROVIDER NOTES
clubs or organizations: None     Relationship status: None    Intimate partner violence     Fear of current or ex partner: None     Emotionally abused: None     Physically abused: None     Forced sexual activity: None   Other Topics Concern    None   Social History Narrative    None           Review of Systems:  Constitutional:  Denies fever, chills, weight loss or weakness   Eyes:  Denies photophobia or discharge   HENT:  Denies sore throat or ear pain   Respiratory:  Denies cough or shortness of breath   Cardiovascular:  Denies chest pain, palpitations or swelling   GI:  Denies abdominal pain, nausea, vomiting, or diarrhea   Musculoskeletal: Right hip pain, left leg weakness  Skin:  Denies rash   Neurologic: Left leg weakness   endocrine:  Denies polyuria or polydypsia   Lymphatic:  Denies swollen glands   Psychiatric:  Denies depression, suicidal ideation or homicidal ideation   All systems negative except as marked. PHYSICAL EXAM    VITAL SIGNS: BP (!) 154/94   Pulse 65   Temp 98 °F (36.7 °C)   Resp 15   Ht 5' 10\" (1.778 m)   Wt 160 lb (72.6 kg)   SpO2 100%   BMI 22.96 kg/m²    Constitutional: Elderly male in no acute distress but left leg weakness   hENT:  Normocephalic, Atraumatic, Bilateral external ears normal, Oropharynx moist, No oral exudates, Nose normal. Neck- Normal range of motion, No tenderness, Supple, No stridor. Eyes:  PERRL, EOMI, Conjunctiva normal, No discharge. Respiratory:  Normal breath sounds, No respiratory distress, No wheezing, No chest tenderness. Cardiovascular:  Normal heart rate, Normal rhythm, No murmurs, No rubs, No gallops. GI:  Bowel sounds normal, Soft, No tenderness, No masses, No pulsatile masses. : External genitalia appear normal, No masses or lesions. No discharge. No CVA tenderness. Musculoskeletal: Patient complains of right hip pain. Left leg weakness.   Left leg below the left knee with altered sensation, exaggerated sensation to touch

## 2020-05-12 LAB
EKG ATRIAL RATE: 68 BPM
EKG P AXIS: 69 DEGREES
EKG P-R INTERVAL: 208 MS
EKG Q-T INTERVAL: 424 MS
EKG QRS DURATION: 134 MS
EKG QTC CALCULATION (BAZETT): 450 MS
EKG R AXIS: -41 DEGREES
EKG T AXIS: 10 DEGREES
EKG VENTRICULAR RATE: 68 BPM

## 2020-05-12 PROCEDURE — 93010 ELECTROCARDIOGRAM REPORT: CPT | Performed by: INTERNAL MEDICINE

## 2020-05-14 ENCOUNTER — TELEPHONE (OUTPATIENT)
Dept: CARDIOLOGY CLINIC | Age: 85
End: 2020-05-14

## 2020-05-28 ENCOUNTER — OFFICE VISIT (OUTPATIENT)
Dept: FAMILY MEDICINE CLINIC | Age: 85
End: 2020-05-28
Payer: MEDICARE

## 2020-05-28 VITALS
WEIGHT: 170 LBS | HEIGHT: 70 IN | DIASTOLIC BLOOD PRESSURE: 73 MMHG | OXYGEN SATURATION: 96 % | RESPIRATION RATE: 18 BRPM | SYSTOLIC BLOOD PRESSURE: 113 MMHG | HEART RATE: 62 BPM | BODY MASS INDEX: 24.34 KG/M2

## 2020-05-28 PROBLEM — M54.50 CHRONIC LOW BACK PAIN: Status: ACTIVE | Noted: 2019-06-17

## 2020-05-28 PROBLEM — K52.9 GASTROENTERITIS, ACUTE: Status: RESOLVED | Noted: 2017-03-23 | Resolved: 2020-05-28

## 2020-05-28 PROBLEM — R07.9 CHEST PAIN: Status: RESOLVED | Noted: 2018-10-01 | Resolved: 2020-05-28

## 2020-05-28 PROBLEM — G89.29 CHRONIC LOW BACK PAIN: Status: ACTIVE | Noted: 2019-06-17

## 2020-05-28 PROBLEM — G60.3 IDIOPATHIC PROGRESSIVE POLYNEUROPATHY: Status: ACTIVE | Noted: 2020-05-28

## 2020-05-28 PROBLEM — R35.1 NOCTURIA: Status: RESOLVED | Noted: 2018-04-10 | Resolved: 2020-05-28

## 2020-05-28 PROBLEM — Z95.1 S/P CABG X 3: Status: ACTIVE | Noted: 2019-07-01

## 2020-05-28 PROBLEM — N39.43 POST-VOID DRIBBLING: Status: RESOLVED | Noted: 2018-04-10 | Resolved: 2020-05-28

## 2020-05-28 PROBLEM — M19.049 ARTHRITIS OF HAND, DEGENERATIVE: Status: RESOLVED | Noted: 2017-01-20 | Resolved: 2020-05-28

## 2020-05-28 PROBLEM — J32.4 CHRONIC PANSINUSITIS: Status: ACTIVE | Noted: 2020-05-28

## 2020-05-28 PROBLEM — R10.13 EPIGASTRIC PAIN: Status: RESOLVED | Noted: 2017-06-14 | Resolved: 2020-05-28

## 2020-05-28 PROBLEM — J44.1 COPD WITH ACUTE EXACERBATION (HCC): Status: RESOLVED | Noted: 2017-12-14 | Resolved: 2020-05-28

## 2020-05-28 PROBLEM — I63.411 CEREBROVASCULAR ACCIDENT (CVA) DUE TO EMBOLISM OF RIGHT MIDDLE CEREBRAL ARTERY (HCC): Status: ACTIVE | Noted: 2020-05-11

## 2020-05-28 PROBLEM — R30.0 DYSURIA: Status: RESOLVED | Noted: 2018-04-10 | Resolved: 2020-05-28

## 2020-05-28 PROCEDURE — 99204 OFFICE O/P NEW MOD 45 MIN: CPT | Performed by: INTERNAL MEDICINE

## 2020-05-28 PROCEDURE — 1123F ACP DISCUSS/DSCN MKR DOCD: CPT | Performed by: INTERNAL MEDICINE

## 2020-05-28 PROCEDURE — 4040F PNEUMOC VAC/ADMIN/RCVD: CPT | Performed by: INTERNAL MEDICINE

## 2020-05-28 PROCEDURE — G8420 CALC BMI NORM PARAMETERS: HCPCS | Performed by: INTERNAL MEDICINE

## 2020-05-28 PROCEDURE — 1036F TOBACCO NON-USER: CPT | Performed by: INTERNAL MEDICINE

## 2020-05-28 PROCEDURE — G8427 DOCREV CUR MEDS BY ELIG CLIN: HCPCS | Performed by: INTERNAL MEDICINE

## 2020-05-28 SDOH — ECONOMIC STABILITY: FOOD INSECURITY: WITHIN THE PAST 12 MONTHS, THE FOOD YOU BOUGHT JUST DIDN'T LAST AND YOU DIDN'T HAVE MONEY TO GET MORE.: NEVER TRUE

## 2020-05-28 SDOH — ECONOMIC STABILITY: INCOME INSECURITY: HOW HARD IS IT FOR YOU TO PAY FOR THE VERY BASICS LIKE FOOD, HOUSING, MEDICAL CARE, AND HEATING?: NOT HARD AT ALL

## 2020-05-28 SDOH — ECONOMIC STABILITY: TRANSPORTATION INSECURITY
IN THE PAST 12 MONTHS, HAS THE LACK OF TRANSPORTATION KEPT YOU FROM MEDICAL APPOINTMENTS OR FROM GETTING MEDICATIONS?: NO

## 2020-05-28 SDOH — ECONOMIC STABILITY: FOOD INSECURITY: WITHIN THE PAST 12 MONTHS, YOU WORRIED THAT YOUR FOOD WOULD RUN OUT BEFORE YOU GOT MONEY TO BUY MORE.: NEVER TRUE

## 2020-05-28 SDOH — ECONOMIC STABILITY: TRANSPORTATION INSECURITY
IN THE PAST 12 MONTHS, HAS LACK OF TRANSPORTATION KEPT YOU FROM MEETINGS, WORK, OR FROM GETTING THINGS NEEDED FOR DAILY LIVING?: NO

## 2020-05-28 ASSESSMENT — ENCOUNTER SYMPTOMS
ABDOMINAL PAIN: 0
NAUSEA: 0
BLOOD IN STOOL: 0
WHEEZING: 0
SORE THROAT: 0
COUGH: 0
SHORTNESS OF BREATH: 0
CONSTIPATION: 0
ALLERGIC/IMMUNOLOGIC NEGATIVE: 1

## 2020-05-28 ASSESSMENT — PATIENT HEALTH QUESTIONNAIRE - PHQ9
1. LITTLE INTEREST OR PLEASURE IN DOING THINGS: 0
2. FEELING DOWN, DEPRESSED OR HOPELESS: 0
SUM OF ALL RESPONSES TO PHQ9 QUESTIONS 1 & 2: 0
SUM OF ALL RESPONSES TO PHQ QUESTIONS 1-9: 0
SUM OF ALL RESPONSES TO PHQ QUESTIONS 1-9: 0

## 2020-05-28 NOTE — PROGRESS NOTES
history of drug use. Family History:     Family History   Problem Relation Age of Onset    Heart Attack Father     Heart Attack Brother        Review of Systems:         Review of Systems   Constitutional: Negative for activity change, appetite change, fatigue and unexpected weight change. HENT: Negative for congestion, ear discharge, ear pain and sore throat. Eyes: Negative for visual disturbance. Respiratory: Negative for cough, shortness of breath and wheezing. Cardiovascular: Negative for chest pain, palpitations, orthopnea and leg swelling. Gastrointestinal: Negative for abdominal pain, blood in stool, constipation and nausea. Endocrine: Negative for cold intolerance, heat intolerance, polydipsia and polyuria. Genitourinary: Negative for difficulty urinating and dysuria. Musculoskeletal: Negative. Negative for myalgias. Skin: Negative for rash. Allergic/Immunologic: Negative. Neurological: Positive for weakness (LUE and LLE) and numbness. Negative for dizziness, seizures and headaches. Psychiatric/Behavioral: Negative for behavioral problems and dysphoric mood. The patient is not nervous/anxious. Physical Exam:     Vitals:  /73   Pulse 62   Resp 18   Ht 5' 10\" (1.778 m)   Wt 170 lb (77.1 kg)   SpO2 96%   BMI 24.39 kg/m²       Physical Exam  Vitals signs reviewed. Constitutional:       General: He is not in acute distress. Appearance: He is well-developed. HENT:      Head: Normocephalic and atraumatic. Right Ear: Tympanic membrane, ear canal and external ear normal. There is no impacted cerumen. Left Ear: Tympanic membrane and ear canal normal. There is no impacted cerumen. Nose: Nose normal. No congestion. Mouth/Throat:      Mouth: Mucous membranes are moist.      Pharynx: Oropharynx is clear. Eyes:      General: No scleral icterus. Right eye: No discharge. Left eye: No discharge.       Conjunctiva/sclera: TSH 1.61 03/03/2020     Lab Results   Component Value Date    CHOL 123 09/09/2019    HDL 46 09/09/2019    PSA 0.72 07/25/2017          Assessment & Plan        Diagnosis Orders   1. Essential hypertension   BP is controlled on current meds, continue same    2. Hyperlipidemia, unspecified hyperlipidemia type   Lipid panel controlled , continue on Lipitor 80 mg/day      3. Cerebrovascular accident (CVA) due to embolism of right middle cerebral artery (HCC)  Continue PT, ASA, Plavix, Statins    4. Atrial fibrillation, unspecified type [facility])   He is suspected to have Afib   Follow up with Dr Cherylene Aden. Will need to have Holter monitor for evaluation. Continue on Eliquis     5. S/P CABG x 3   Asymptomatic, continue medical management    6. Idiopathic progressive polyneuropathy   On gabapentin prn                      Completed Refills   Requested Prescriptions      No prescriptions requested or ordered in this encounter     Return in about 3 months (around 8/28/2020) for HTN, hyperlipidemia. No orders of the defined types were placed in this encounter. No orders of the defined types were placed in this encounter. There are no Patient Instructions on file for this visit.     Electronically signed by Myesha Jeronimo MD on 5/31/2020 at 7:49 PM           Completed Refills      Requested Prescriptions      No prescriptions requested or ordered in this encounter

## 2020-05-31 ASSESSMENT — ENCOUNTER SYMPTOMS: ORTHOPNEA: 0

## 2020-06-03 ENCOUNTER — OFFICE VISIT (OUTPATIENT)
Dept: CARDIOLOGY CLINIC | Age: 85
End: 2020-06-03
Payer: MEDICARE

## 2020-06-03 ENCOUNTER — HOSPITAL ENCOUNTER (OUTPATIENT)
Age: 85
Discharge: HOME | End: 2020-06-03
Payer: MEDICARE

## 2020-06-03 VITALS
HEART RATE: 60 BPM | BODY MASS INDEX: 24.39 KG/M2 | OXYGEN SATURATION: 95 % | DIASTOLIC BLOOD PRESSURE: 80 MMHG | SYSTOLIC BLOOD PRESSURE: 140 MMHG | WEIGHT: 170 LBS

## 2020-06-03 LAB
ABSOLUTE EOS #: 0.2 K/UL (ref 0–0.4)
ABSOLUTE IMMATURE GRANULOCYTE: ABNORMAL K/UL (ref 0–0.3)
ABSOLUTE LYMPH #: 1.5 K/UL (ref 1–4.8)
ABSOLUTE MONO #: 0.8 K/UL (ref 0–1)
ALBUMIN SERPL-MCNC: 4.1 G/DL (ref 3.5–5.2)
ALBUMIN/GLOBULIN RATIO: ABNORMAL (ref 1–2.5)
ALP BLD-CCNC: 89 U/L (ref 40–129)
ALT SERPL-CCNC: 21 U/L (ref 5–41)
ANION GAP SERPL CALCULATED.3IONS-SCNC: 10 MMOL/L (ref 9–17)
AST SERPL-CCNC: 23 U/L
BASOPHILS # BLD: 1 % (ref 0–2)
BASOPHILS ABSOLUTE: 0 K/UL (ref 0–0.2)
BILIRUB SERPL-MCNC: 0.56 MG/DL (ref 0.3–1.2)
BUN BLDV-MCNC: 19 MG/DL (ref 8–23)
BUN/CREAT BLD: 15 (ref 9–20)
CALCIUM SERPL-MCNC: 9.4 MG/DL (ref 8.6–10.4)
CHLORIDE BLD-SCNC: 104 MMOL/L (ref 98–107)
CO2: 24 MMOL/L (ref 20–31)
CREAT SERPL-MCNC: 1.24 MG/DL (ref 0.7–1.2)
DIFFERENTIAL TYPE: YES
EOSINOPHILS RELATIVE PERCENT: 3 % (ref 0–5)
GFR AFRICAN AMERICAN: >60 ML/MIN
GFR NON-AFRICAN AMERICAN: 56 ML/MIN
GFR SERPL CREATININE-BSD FRML MDRD: ABNORMAL ML/MIN/{1.73_M2}
GFR SERPL CREATININE-BSD FRML MDRD: ABNORMAL ML/MIN/{1.73_M2}
GLUCOSE BLD-MCNC: 135 MG/DL (ref 70–99)
HCT VFR BLD CALC: 40.1 % (ref 41–53)
HEMOGLOBIN: 13.6 G/DL (ref 13.5–17.5)
IMMATURE GRANULOCYTES: ABNORMAL %
LYMPHOCYTES # BLD: 20 % (ref 13–44)
MCH RBC QN AUTO: 28 PG (ref 26–34)
MCHC RBC AUTO-ENTMCNC: 33.8 G/DL (ref 31–37)
MCV RBC AUTO: 82.9 FL (ref 80–100)
MONOCYTES # BLD: 11 % (ref 5–9)
NRBC AUTOMATED: ABNORMAL PER 100 WBC
PDW BLD-RTO: 14.6 % (ref 12.1–15.2)
PLATELET # BLD: 267 K/UL (ref 140–450)
PLATELET ESTIMATE: ABNORMAL
PMV BLD AUTO: ABNORMAL FL (ref 6–12)
POTASSIUM SERPL-SCNC: 4.2 MMOL/L (ref 3.7–5.3)
RBC # BLD: 4.84 M/UL (ref 4.5–5.9)
RBC # BLD: ABNORMAL 10*6/UL
SEG NEUTROPHILS: 65 % (ref 39–75)
SEGMENTED NEUTROPHILS ABSOLUTE COUNT: 4.8 K/UL (ref 2.1–6.5)
SODIUM BLD-SCNC: 138 MMOL/L (ref 135–144)
TOTAL PROTEIN: 7.1 G/DL (ref 6.4–8.3)
WBC # BLD: 7.4 K/UL (ref 3.5–11)
WBC # BLD: ABNORMAL 10*3/UL

## 2020-06-03 PROCEDURE — 1036F TOBACCO NON-USER: CPT | Performed by: INTERNAL MEDICINE

## 2020-06-03 PROCEDURE — 36415 COLL VENOUS BLD VENIPUNCTURE: CPT

## 2020-06-03 PROCEDURE — 1123F ACP DISCUSS/DSCN MKR DOCD: CPT | Performed by: INTERNAL MEDICINE

## 2020-06-03 PROCEDURE — 85025 COMPLETE CBC W/AUTO DIFF WBC: CPT

## 2020-06-03 PROCEDURE — G8420 CALC BMI NORM PARAMETERS: HCPCS | Performed by: INTERNAL MEDICINE

## 2020-06-03 PROCEDURE — 99214 OFFICE O/P EST MOD 30 MIN: CPT | Performed by: INTERNAL MEDICINE

## 2020-06-03 PROCEDURE — G8427 DOCREV CUR MEDS BY ELIG CLIN: HCPCS | Performed by: INTERNAL MEDICINE

## 2020-06-03 PROCEDURE — 4040F PNEUMOC VAC/ADMIN/RCVD: CPT | Performed by: INTERNAL MEDICINE

## 2020-06-03 PROCEDURE — 80053 COMPREHEN METABOLIC PANEL: CPT

## 2020-06-03 NOTE — LETTER
unremarkable circumflex, with a 4.0 x 22 mm Synergy stent placed in the LAD. On 06/14/2019, he developed chest pain and shortness of breath, transferred to [facility], where a catheterization showed severe critical left main trunk disease with 50% left main trunk, 75% disease in the LAD and ostium of the circumflex. The right coronary artery had 80% disease, EF of 65%. He had open heart surgery by Dr. Karen Newton on 06/24/2019, with a LIMA to the LAD, a vein graft to the PDA, and a vein graft to the OM branch of the circumflex. He had postoperative atrial fibrillation with RVR, treated with amiodarone. He had been doing relatively well at home. He was having no chest pain. He has chronic back pain and, therefore, was not going to start cardiac rehab. He was doing well until 05/11 when he developed sudden weakness of his left lower extremity and left arm. A CT scan of the head showed moderate degenerative changes with no bleed. He was transferred to [facility] in [place] where a CTA showed occlusion of the distal intradural segment of the right vertebral artery, with a right MCA territorial stroke with ischemic infarct. He was seen by Dr. Karma Thorne. Echocardiogram was unremarkable showing normal LV size and function with EF of 55% to 60% with no significant valve abnormality. It was felt the most likely etiology of his emboli would be occult atrial fibrillation. He was placed on Eliquis 5 mg b.i.d. along with Plavix 75 mg daily and aspirin 81 mg daily. He was going to be discharged but had an episode of chest pain. His troponins were mildly elevated, but his chest pain subsided within several hours. There was no acute change on the EKG. He was kept one extra day and then discharged home.   He was to wear a 30-day event recorder, which we ordered; however, because of marked chest tenderness and sensitivity to patches, he did not wish to have a 30-day event recorder and sent it back. I brought him in today for reevaluation. He still has residual weakness of his left arm and left leg, but he is walking with a cane. The strength is improving, especially in his hand. Home health care is seeing him 3 days a week and he is gradually improving. He has had no syncope or near syncope, lightheadedness or dizziness. No PND, orthopnea or pedal edema. He has had no palpitations. On discharge, he was on aspirin, Plavix, and Eliquis, with the instruction to stay on all three for 1 month and then stop aspirin and remain on Plavix and Eliquis. CARDIA RISK FACTORS:  Known CAD:  Positive. PTCA:  Positive. Open Heart Surgery:  Positive. Hypertension:  Positive. Hyperlipidemia:  Positive. Other Family Members:  Positive. Peripheral Vascular Disease:  Negative. Diabetes:  Negative. Smoking:  Negative. MEDICATIONS AT THIS TIME:  He is on Eliquis 5 mg b.i.d., aspirin 81 mg daily with him stopping on 2020, Lipitor 80 mg daily, Klonopin 0.5 mg b.i.d., Plavix 75 mg daily, Colace daily, Neurontin 400 mg daily, Imdur 30 mg daily, lisinopril 5 mg daily, Toprol-XL 25 mg daily, Naprosyn p.r.n., Prilosec 40 mg daily, Inderal 80 mg t.i.d., Flomax 0.4 mg daily, Effexor 100 mg daily, vitamin D 3000 units daily. PAST MEDICAL HISTORY:  1. Cardiac as above with a total of 15 stents and open heart surgery on 2019.  2.  Right knee surgery on 2013. 3.  Hypertension. 4.  Hyperlipidemia. 5.  Right knee replacement. 6.  COPD. 7.  Ureteral stones. 8.  Bilateral inguinal hernia repair. 9.  Large ventral hernia, which has been painful and not repaired. 10.  He has had cervical disk disease. 11.  Recent CVA on 2020, with residual left-sided weakness. FAMILY HISTORY:  Father  of an MI at 76. Brother  of an MI at 54.   Sister  at 67 of an MI.    SOCIAL HISTORY:  He is 82years old, , 3 sons, with one being a 3.  We will place an implantable loop recorder to monitor for atrial fibrillation. DISCUSSION:  Mr. Alcye Lizarraga is very complex. He had a CVA on 05/11 with acute weakness of his left arm and left leg. The MRI showed several small acute infarcts in his right perirolandic region and in the right frontal as well as precentral and postcentral gyri of the medial right frontal and parietal lobes, most consistent with multiple emboli. His echocardiogram was unremarkable. However, it was felt that this was most likely from occult atrial fibrillation. He was subsequently anticoagulated with Eliquis along with Plavix and aspirin, with the instruction to stop aspirin after 1 month and remain on Plavix and Eliquis. He was originally scheduled to wear a 30-day event recorder to monitor for atrial fibrillation, but he is unable to because of sensitivity to the patches. I had a discussion concerning an implantable loop recorder, which I think would be a very nice option to be able to monitor for atrial fibrillation. Obviously, being on long-term Eliquis is risky, especially at 82years old. Therefore, again, it would be very helpful to monitor for occult atrial fibrillation. I would anticoagulate and watch for at least a year. If he had no atrial fibrillation at that time, one could make a case for stopping Eliquis versus being on low dose, etc.    He does wish to proceed with the implantable loop recorder. We will do so at the earliest opportunity. He still has mild weakness of his left arm and left leg, but hopefully this will continue to resolve. Thank you very much for allowing me the privilege of seeing Mr. Alyce Lizarraga. If you have any questions on my thoughts, please do not hesitate to contact me.      Sincerely,        Rober Haney    D: 06/04/2020 5:05:56     T: 06/04/2020 5:14:10     [id number]  Job#: [id number]   Doc#: [id number]

## 2020-06-03 NOTE — LETTER
[address]\"" CARDIOLOGY SPECIALIST  [place] [id number]  Dept: [phone]  Dept Fax: [phone]          6/3/20      To Whom it May Concern: In my opinion, from a cardiology standpoint, Lynda Jaquez is stopping  Aspirin and is ok to be on Eliquis. If you have any questions, please feel free to call me at [phone].     Sincerely,          Dalton Marshall MD

## 2020-06-05 ENCOUNTER — HOSPITAL ENCOUNTER (OUTPATIENT)
Age: 85
Setting detail: OUTPATIENT SURGERY
End: 2020-06-05
Attending: INTERNAL MEDICINE | Admitting: INTERNAL MEDICINE
Payer: MEDICARE

## 2020-06-11 NOTE — PROGRESS NOTES
Ov DR Nevaeh Dawson follow up   Post discharge from [facility] to ED on 5/11 lt sided weakness  Transferred to Replaced by [facility] for  CVA still weakness lt arm and lt leg   Walks with a cane  During hospital stay   Had chest pain day suppose to   Go to [place] for rehab   Told had MI. Doing physical therapy at home. Placed on Eliquis. Unable to do event monitor  Still has soreness of chest   Still from CABG. Son  in [place]  Dealing with the riots. Discussed loop recorder  Pt agrees to do this.    Will set up with surgery  Labs today
him in today for reevaluation. He still has residual weakness of his left arm and left leg, but he is walking with a cane. The strength is improving, especially in his hand. Home health care is seeing him 3 days a week and he is gradually improving. He has had no syncope or near syncope, lightheadedness or dizziness. No PND, orthopnea or pedal edema. He has had no palpitations. On discharge, he was on aspirin, Plavix, and Eliquis, with the instruction to stay on all three for 1 month and then stop aspirin and remain on Plavix and Eliquis. CARDIA RISK FACTORS:  Known CAD:  Positive. PTCA:  Positive. Open Heart Surgery:  Positive. Hypertension:  Positive. Hyperlipidemia:  Positive. Other Family Members:  Positive. Peripheral Vascular Disease:  Negative. Diabetes:  Negative. Smoking:  Negative. MEDICATIONS AT THIS TIME:  He is on Eliquis 5 mg b.i.d., aspirin 81 mg daily with him stopping on 2020, Lipitor 80 mg daily, Klonopin 0.5 mg b.i.d., Plavix 75 mg daily, Colace daily, Neurontin 400 mg daily, Imdur 30 mg daily, lisinopril 5 mg daily, Toprol-XL 25 mg daily, Naprosyn p.r.n., Prilosec 40 mg daily, Inderal 80 mg t.i.d., Flomax 0.4 mg daily, Effexor 100 mg daily, vitamin D 3000 units daily. PAST MEDICAL HISTORY:  1. Cardiac as above with a total of 15 stents and open heart surgery on 2019.  2.  Right knee surgery on 2013. 3.  Hypertension. 4.  Hyperlipidemia. 5.  Right knee replacement. 6.  COPD. 7.  Ureteral stones. 8.  Bilateral inguinal hernia repair. 9.  Large ventral hernia, which has been painful and not repaired. 10.  He has had cervical disk disease. 11.  Recent CVA on 2020, with residual left-sided weakness. FAMILY HISTORY:  Father  of an MI at 76. Brother  of an MI at 54.   Sister  at 67 of an MI.    SOCIAL HISTORY:  He is 82years old, , 3 sons, with one being a  in [place], another son has traumatic stress

## 2020-06-15 ENCOUNTER — HOSPITAL ENCOUNTER (OUTPATIENT)
Dept: PREADMISSION TESTING | Age: 85
Setting detail: SPECIMEN
Discharge: HOME | End: 2020-06-15
Payer: MEDICARE

## 2020-06-15 ENCOUNTER — HOSPITAL ENCOUNTER (EMERGENCY)
Age: 85
Discharge: ANOTHER HOSPITAL | End: 2020-06-16
Attending: FAMILY MEDICINE
Payer: MEDICARE

## 2020-06-15 ENCOUNTER — APPOINTMENT (OUTPATIENT)
Dept: CT IMAGING | Age: 85
End: 2020-06-15
Payer: MEDICARE

## 2020-06-15 LAB
-: NORMAL
ABSOLUTE EOS #: 0.1 K/UL (ref 0–0.4)
ABSOLUTE IMMATURE GRANULOCYTE: ABNORMAL K/UL (ref 0–0.3)
ABSOLUTE LYMPH #: 0.8 K/UL (ref 1–4.8)
ABSOLUTE MONO #: 0.2 K/UL (ref 0–1)
ALBUMIN SERPL-MCNC: 4.2 G/DL (ref 3.5–5.2)
ALBUMIN/GLOBULIN RATIO: ABNORMAL (ref 1–2.5)
ALP BLD-CCNC: 218 U/L (ref 40–129)
ALT SERPL-CCNC: 255 U/L (ref 5–41)
AMORPHOUS: NORMAL
ANION GAP SERPL CALCULATED.3IONS-SCNC: 13 MMOL/L (ref 9–17)
AST SERPL-CCNC: 485 U/L
BACTERIA: NORMAL
BASOPHILS # BLD: 0 % (ref 0–2)
BASOPHILS ABSOLUTE: 0 K/UL (ref 0–0.2)
BILIRUB SERPL-MCNC: 1.47 MG/DL (ref 0.3–1.2)
BILIRUBIN URINE: NEGATIVE
BUN BLDV-MCNC: 19 MG/DL (ref 8–23)
BUN/CREAT BLD: 17 (ref 9–20)
CALCIUM SERPL-MCNC: 9.7 MG/DL (ref 8.6–10.4)
CASTS UA: NORMAL /LPF
CHLORIDE BLD-SCNC: 102 MMOL/L (ref 98–107)
CO2: 22 MMOL/L (ref 20–31)
COLOR: YELLOW
COMMENT UA: ABNORMAL
CREAT SERPL-MCNC: 1.15 MG/DL (ref 0.7–1.2)
CRYSTALS, UA: NORMAL /HPF
DIFFERENTIAL TYPE: YES
EOSINOPHILS RELATIVE PERCENT: 1 % (ref 0–5)
EPITHELIAL CELLS UA: NORMAL /HPF
GFR AFRICAN AMERICAN: >60 ML/MIN
GFR NON-AFRICAN AMERICAN: >60 ML/MIN
GFR SERPL CREATININE-BSD FRML MDRD: ABNORMAL ML/MIN/{1.73_M2}
GFR SERPL CREATININE-BSD FRML MDRD: ABNORMAL ML/MIN/{1.73_M2}
GLUCOSE BLD-MCNC: 148 MG/DL (ref 70–99)
GLUCOSE URINE: NEGATIVE
HCT VFR BLD CALC: 41.8 % (ref 41–53)
HEMOGLOBIN: 14.1 G/DL (ref 13.5–17.5)
IMMATURE GRANULOCYTES: ABNORMAL %
KETONES, URINE: NEGATIVE
LEUKOCYTE ESTERASE, URINE: NEGATIVE
LIPASE: 1343 U/L (ref 13–60)
LYMPHOCYTES # BLD: 9 % (ref 13–44)
MCH RBC QN AUTO: 27.9 PG (ref 26–34)
MCHC RBC AUTO-ENTMCNC: 33.7 G/DL (ref 31–37)
MCV RBC AUTO: 82.9 FL (ref 80–100)
MONOCYTES # BLD: 2 % (ref 5–9)
MUCUS: NORMAL
NITRITE, URINE: NEGATIVE
NRBC AUTOMATED: ABNORMAL PER 100 WBC
OTHER OBSERVATIONS UA: NORMAL
PDW BLD-RTO: 15 % (ref 12.1–15.2)
PH UA: 6 (ref 5–8)
PLATELET # BLD: 201 K/UL (ref 140–450)
PLATELET ESTIMATE: ABNORMAL
PMV BLD AUTO: ABNORMAL FL (ref 6–12)
POTASSIUM SERPL-SCNC: 3.8 MMOL/L (ref 3.7–5.3)
PROTEIN UA: NEGATIVE
RBC # BLD: 5.05 M/UL (ref 4.5–5.9)
RBC # BLD: ABNORMAL 10*6/UL
RBC UA: NORMAL /HPF (ref 0–2)
RENAL EPITHELIAL, UA: NORMAL /HPF
SARS-COV-2, PCR: NORMAL
SARS-COV-2, RAPID: NORMAL
SARS-COV-2: NOT DETECTED
SEG NEUTROPHILS: 88 % (ref 39–75)
SEGMENTED NEUTROPHILS ABSOLUTE COUNT: 7.6 K/UL (ref 2.1–6.5)
SODIUM BLD-SCNC: 137 MMOL/L (ref 135–144)
SOURCE: NORMAL
SPECIFIC GRAVITY UA: 1.01 (ref 1–1.03)
TOTAL PROTEIN: 7.3 G/DL (ref 6.4–8.3)
TRICHOMONAS: NORMAL
TURBIDITY: CLEAR
URINE HGB: ABNORMAL
UROBILINOGEN, URINE: NORMAL
WBC # BLD: 8.7 K/UL (ref 3.5–11)
WBC # BLD: ABNORMAL 10*3/UL
WBC UA: NORMAL /HPF
YEAST: NORMAL

## 2020-06-15 PROCEDURE — 6360000002 HC RX W HCPCS: Performed by: FAMILY MEDICINE

## 2020-06-15 PROCEDURE — 83690 ASSAY OF LIPASE: CPT

## 2020-06-15 PROCEDURE — 2580000003 HC RX 258: Performed by: FAMILY MEDICINE

## 2020-06-15 PROCEDURE — U0003 INFECTIOUS AGENT DETECTION BY NUCLEIC ACID (DNA OR RNA); SEVERE ACUTE RESPIRATORY SYNDROME CORONAVIRUS 2 (SARS-COV-2) (CORONAVIRUS DISEASE [COVID-19]), AMPLIFIED PROBE TECHNIQUE, MAKING USE OF HIGH THROUGHPUT TECHNOLOGIES AS DESCRIBED BY CMS-2020-01-R: HCPCS

## 2020-06-15 PROCEDURE — 80053 COMPREHEN METABOLIC PANEL: CPT

## 2020-06-15 PROCEDURE — 99285 EMERGENCY DEPT VISIT HI MDM: CPT

## 2020-06-15 PROCEDURE — 81001 URINALYSIS AUTO W/SCOPE: CPT

## 2020-06-15 PROCEDURE — 96365 THER/PROPH/DIAG IV INF INIT: CPT

## 2020-06-15 PROCEDURE — 96375 TX/PRO/DX INJ NEW DRUG ADDON: CPT

## 2020-06-15 PROCEDURE — 6360000004 HC RX CONTRAST MEDICATION: Performed by: FAMILY MEDICINE

## 2020-06-15 PROCEDURE — 74177 CT ABD & PELVIS W/CONTRAST: CPT

## 2020-06-15 PROCEDURE — 85025 COMPLETE CBC W/AUTO DIFF WBC: CPT

## 2020-06-15 RX ORDER — ONDANSETRON 2 MG/ML
4 INJECTION INTRAMUSCULAR; INTRAVENOUS ONCE
Status: COMPLETED | OUTPATIENT
Start: 2020-06-15 | End: 2020-06-15

## 2020-06-15 RX ORDER — MORPHINE SULFATE 4 MG/ML
2 INJECTION, SOLUTION INTRAMUSCULAR; INTRAVENOUS ONCE
Status: COMPLETED | OUTPATIENT
Start: 2020-06-15 | End: 2020-06-15

## 2020-06-15 RX ORDER — KETOROLAC TROMETHAMINE 30 MG/ML
30 INJECTION, SOLUTION INTRAMUSCULAR; INTRAVENOUS ONCE
Status: COMPLETED | OUTPATIENT
Start: 2020-06-15 | End: 2020-06-15

## 2020-06-15 RX ORDER — 0.9 % SODIUM CHLORIDE 0.9 %
1000 INTRAVENOUS SOLUTION INTRAVENOUS ONCE
Status: COMPLETED | OUTPATIENT
Start: 2020-06-15 | End: 2020-06-15

## 2020-06-15 RX ADMIN — ONDANSETRON 4 MG: 2 INJECTION INTRAMUSCULAR; INTRAVENOUS at 21:34

## 2020-06-15 RX ADMIN — SODIUM CHLORIDE 1000 ML: 9 INJECTION, SOLUTION INTRAVENOUS at 22:34

## 2020-06-15 RX ADMIN — MORPHINE SULFATE 2 MG: 4 INJECTION, SOLUTION INTRAMUSCULAR; INTRAVENOUS at 21:57

## 2020-06-15 RX ADMIN — KETOROLAC TROMETHAMINE 30 MG: 30 INJECTION, SOLUTION INTRAMUSCULAR; INTRAVENOUS at 21:34

## 2020-06-15 RX ADMIN — IOPAMIDOL 75 ML: 755 INJECTION, SOLUTION INTRAVENOUS at 22:15

## 2020-06-15 RX ADMIN — ONDANSETRON 4 MG: 2 INJECTION INTRAMUSCULAR; INTRAVENOUS at 21:09

## 2020-06-15 RX ADMIN — LIDOCAINE HYDROCHLORIDE 72.6 MG: 20 INJECTION, SOLUTION INTRAVENOUS at 21:12

## 2020-06-15 ASSESSMENT — PAIN DESCRIPTION - DESCRIPTORS: DESCRIPTORS: SHARP

## 2020-06-15 ASSESSMENT — ENCOUNTER SYMPTOMS
ABDOMINAL PAIN: 1
NAUSEA: 1
VOMITING: 1

## 2020-06-15 ASSESSMENT — PAIN SCALES - GENERAL
PAINLEVEL_OUTOF10: 10
PAINLEVEL_OUTOF10: 10
PAINLEVEL_OUTOF10: 9

## 2020-06-15 ASSESSMENT — PAIN DESCRIPTION - ORIENTATION: ORIENTATION: RIGHT

## 2020-06-15 ASSESSMENT — PAIN DESCRIPTION - LOCATION: LOCATION: FLANK

## 2020-06-15 ASSESSMENT — PAIN DESCRIPTION - FREQUENCY: FREQUENCY: CONTINUOUS

## 2020-06-16 VITALS
HEIGHT: 70 IN | SYSTOLIC BLOOD PRESSURE: 92 MMHG | DIASTOLIC BLOOD PRESSURE: 59 MMHG | HEART RATE: 104 BPM | TEMPERATURE: 98.1 F | RESPIRATION RATE: 18 BRPM | OXYGEN SATURATION: 94 % | WEIGHT: 160 LBS | BODY MASS INDEX: 22.9 KG/M2

## 2020-06-16 PROCEDURE — 2580000003 HC RX 258: Performed by: FAMILY MEDICINE

## 2020-06-16 PROCEDURE — 6360000002 HC RX W HCPCS: Performed by: FAMILY MEDICINE

## 2020-06-16 PROCEDURE — 96376 TX/PRO/DX INJ SAME DRUG ADON: CPT

## 2020-06-16 PROCEDURE — 96375 TX/PRO/DX INJ NEW DRUG ADDON: CPT

## 2020-06-16 RX ORDER — MORPHINE SULFATE 4 MG/ML
2 INJECTION, SOLUTION INTRAMUSCULAR; INTRAVENOUS ONCE
Status: COMPLETED | OUTPATIENT
Start: 2020-06-16 | End: 2020-06-16

## 2020-06-16 RX ORDER — ONDANSETRON 2 MG/ML
4 INJECTION INTRAMUSCULAR; INTRAVENOUS ONCE
Status: COMPLETED | OUTPATIENT
Start: 2020-06-16 | End: 2020-06-16

## 2020-06-16 RX ORDER — 0.9 % SODIUM CHLORIDE 0.9 %
1000 INTRAVENOUS SOLUTION INTRAVENOUS ONCE
Status: COMPLETED | OUTPATIENT
Start: 2020-06-16 | End: 2020-06-16

## 2020-06-16 RX ADMIN — MORPHINE SULFATE 2 MG: 4 INJECTION, SOLUTION INTRAMUSCULAR; INTRAVENOUS at 00:12

## 2020-06-16 RX ADMIN — SODIUM CHLORIDE 1000 ML: 9 INJECTION, SOLUTION INTRAVENOUS at 02:45

## 2020-06-16 RX ADMIN — ONDANSETRON 4 MG: 2 INJECTION INTRAMUSCULAR; INTRAVENOUS at 00:11

## 2020-06-16 ASSESSMENT — PAIN DESCRIPTION - LOCATION: LOCATION: FLANK

## 2020-06-16 ASSESSMENT — PAIN DESCRIPTION - FREQUENCY: FREQUENCY: CONTINUOUS

## 2020-06-16 ASSESSMENT — PAIN SCALES - GENERAL
PAINLEVEL_OUTOF10: 9
PAINLEVEL_OUTOF10: 5

## 2020-06-16 ASSESSMENT — PAIN DESCRIPTION - ORIENTATION: ORIENTATION: RIGHT

## 2020-06-16 ASSESSMENT — PAIN DESCRIPTION - DESCRIPTORS: DESCRIPTORS: SHARP

## 2020-06-16 NOTE — ED NOTES
If pt to be discharged home, person to pick him up is Yimi Hutchins at [phone].      Joycelyn Vaz RN  06/15/20 3098

## 2020-06-16 NOTE — ED PROVIDER NOTES
[facility]  eMERGENCY dEPARTMENT eNCOUnter          [facility]       Chief Complaint   Patient presents with    Emesis     Patient arrives to ER today with complaints of abdominal pain and vomiting that began today at approximately 4pm. Patient reports hx of kidney stones that gave him the same symptoms last time. Nurses Notes reviewed and I agree except as noted in the HPI. HISTORY OF PRESENT ILLNESS    Mariana Sanches is a 82 y.o. male who presents emergency room via private vehicle, patient planing of abdominal pain and bilateral flank pain, onset approximately 1600 hrs. (~5 hrs pta) , having sharp continuous pain rating it 10 out of 10. Patient reports history of kidney stones I gave him similar symptoms prior. Patient has had some nausea vomiting, denies fever chills denies dysuria hematuria denies trauma or falls. REVIEW OF SYSTEMS     Review of Systems   Constitutional: Positive for chills. Negative for fever. Gastrointestinal: Positive for abdominal pain, nausea and vomiting. Genitourinary: Positive for flank pain. Negative for dysuria and hematuria. All other systems reviewed and are negative. PAST MEDICAL HISTORY    has a past medical history of Anginal pain ([address] CAD (coronary artery disease), CVA (cerebral vascular accident) ([address] CVA (cerebral vascular accident) ([address] Diverticula of colon, Hyperlipidemia, Hypertension, and Kidney stone. SURGICAL HISTORY      has a past surgical history that includes Cardiac surgery; Stomach surgery; Cardiac catheterization (Left, 12/24/14); Cardiac catheterization (5/4/2007); Cardiac catheterization (5/4/07 con't); Cardiac catheterization (6/12/2008); Percutaneous Transluminal Coronary Angio (3/9/2011); joint replacement (Right, 1998 and 2012 ); joint replacement (Right, 2012); Cholecystectomy; Dilatation, esophagus;  Abdomen surgery; colectomy; hernia repair (Right); eye surgery (Bilateral); Appendectomy; Colonoscopy; Colonoscopy (2015); angioplasty (2016); pr esophagogastroduodenoscopy transoral diagnostic (N/A, 2017); Upper gastrointestinal endoscopy; and angioplasty (10/02/2018). CURRENT MEDICATIONS       Previous Medications    ASPIRIN 81 MG TABLET    Take 81 mg by mouth daily Stopping     ATORVASTATIN (LIPITOR) 80 MG TABLET    Take 80 mg by mouth daily    CLOPIDOGREL (PLAVIX) 75 MG TABLET    Take 75 mg by mouth daily. DOCUSATE SODIUM (COLACE) 100 MG CAPSULE    Take 1 capsule by mouth 3 times daily as needed for Constipation    GABAPENTIN (NEURONTIN) 400 MG CAPSULE    Take 400 mg by mouth every evening    ISOSORBIDE MONONITRATE (IMDUR) 30 MG EXTENDED RELEASE TABLET    1/2 tab PO daily    LISINOPRIL (PRINIVIL;ZESTRIL) 5 MG TABLET    Take 5 mg by mouth daily    METOPROLOL SUCCINATE (TOPROL XL) 25 MG EXTENDED RELEASE TABLET    Take 25 mg by mouth daily    NAPROXEN (NAPROSYN) 375 MG TABLET    Take 375 mg by mouth 2 times daily as needed for Pain    NITROGLYCERIN (NITROSTAT) 0.4 MG SL TABLET    Place 0.4 mg under the tongue every 5 minutes as needed. OMEPRAZOLE (PRILOSEC) 20 MG CAPSULE    Take 40 mg by mouth Daily     ONDANSETRON (ZOFRAN) 4 MG TABLET    Take 1 tablet by mouth every 6 hours as needed for Nausea    PROPRANOLOL (INDERAL) 80 MG TABLET    Take 1 tablet by mouth 3 times daily    VENLAFAXINE (EFFEXOR) 100 MG TABLET    Take 100 mg by mouth every evening     VITAMIN D (CHOLECALCIFEROL) 1000 UNIT TABS TABLET    Take 3,000 Units by mouth daily        ALLERGIES     is allergic to pcn [penicillins]; penicillamine; and ranolazine. FAMILY HISTORY     He indicated that his mother is . He indicated that his father is . He indicated that all of his three sisters are alive. He indicated that four of his five brothers are alive. He indicated that his maternal grandmother is . He indicated that his maternal grandfather is .  He indicated that his paternal grandmother is . He indicated that his paternal grandfather is . He indicated that all of his three sons are alive. family history includes Heart Attack in his brother and father. SOCIAL HISTORY      reports that he quit smoking about 53 years ago. He smoked 10.00 packs per day. He has never used smokeless tobacco. He reports that he does not drink alcohol or use drugs. PHYSICAL EXAM     INITIAL VITALS:  height is 5' 10\" (1.778 m) and weight is 160 lb (72.6 kg). His oral temperature is 98.1 °F (36.7 °C). His blood pressure is 104/47 (abnormal) and his pulse is 106. His respiration is 19 and oxygen saturation is 98%. Physical Exam   Constitutional: Patient is oriented to person, place, and time. Patient appears well-developed and well-nourished. Patient is active and cooperative. HENT:   Head: Normocephalic and atraumatic. Head is without contusion. Right Ear: Hearing and external ear normal. No drainage. Left Ear: Hearing and external ear normal. No drainage. Nose: Nose normal. No nasal deformity. No epistaxis. Mouth/Throat: Mucous membranes are slight dry. Eyes: EOMI. Conjunctivae, sclera, and lids are normal. Right eye exhibits no discharge. Left eye exhibits no discharge. Neck: Full passive range of motion without pain and phonation normal.   Cardiovascular:  Normal rate, regular rhythm and intact distal pulses. No edema. Pulses: Right radial pulse  2+   Pulmonary/Chest: Effort normal. No tachypnea and no bradypnea. Abdominal: Soft. Patient without distension lower abdominal tenderness without rigidity rebound or guarding, positive flank tenderness bilaterally, no overlying integument aberration  Musculoskeletal:   Negative acute trauma or deformity,  apparent full range of motion and normal strength all extremities appropriate to age. Neurological: Patient is alert and oriented to person, place, and time. patient displays no tremor.  Patient displays no seizure activity. .     Skin: Skin is warm and dry. Patient is not diaphoretic. Psychiatric: Patient has a normal mood and affect. Patient speech is normal and behavior is normal. Cognition and memory are normal.    DIFFERENTIAL DIAGNOSIS:   K/s, pyelonephritis, UTI, MSK, abdominal path NOS    DIAGNOSTIC RESULTS           RADIOLOGY: non-plain film images(s) such as CT, Ultrasound and MRI are read by the radiologist.  CT ABDOMEN PELVIS W IV CONTRAST Additional Contrast? None   Final Result      1. No evidence for acute process or inflammation. The large and small bowel    show no signs of obstruction or inflammation. The appendix is unremarkable. 2. Probable punctate but nonobstructing right renal calculus. The kidneys,    ureters and bladder appear to be unremarkable. 3. Additional chronic and degenerative and postsurgical changes as noted. The    common bile duct measures 16 to 17 mm without obvious stone and is stable from    the prior study of 4/8/2018.              LABS:   Labs Reviewed   URINALYSIS - Abnormal; Notable for the following components:       Result Value    Urine Hgb TRACE (*)     All other components within normal limits   CBC WITH AUTO DIFFERENTIAL - Abnormal; Notable for the following components:    Seg Neutrophils 88 (*)     Lymphocytes 9 (*)     Monocytes 2 (*)     Segs Absolute 7.60 (*)     Absolute Lymph # 0.80 (*)     All other components within normal limits   LIPASE - Abnormal; Notable for the following components:    Lipase 1,343 (*)     All other components within normal limits   COMPREHENSIVE METABOLIC PANEL W/ REFLEX TO MG FOR LOW K - Abnormal; Notable for the following components:    Glucose 148 (*)     Alkaline Phosphatase 218 (*)      (*)      (*)     Total Bilirubin 1.47 (*)     All other components within normal limits   MICROSCOPIC URINALYSIS       EMERGENCY DEPARTMENT COURSE:   Vitals:    Vitals:    06/15/20 2637 06/15/20 2351 06/16/20 0005 06/16/20 0016 BP: 118/69 120/61 (!) 104/47 (!) 104/47   Pulse: 104 106 108 106   Resp: 23 19 14 19   Temp:       TempSrc:       SpO2: 95% 98% 98% 98%   Weight:       Height:         Patient history and physical exam taken at bedside, discussed patient symptoms and exam findings, discussed initial plan work-up to include IV access, will give IV cardiac lidocaine 1 mg/kg, IV Zofran for nausea, blood and urine studies. Patient resting in bed semi-Fowlers, acknowledges    With continued pain and nausea, ketorolac 30 mg IV and Zofran 4 mg IV ordered    Received phone call from lab, critical lab values, lipase 1343, AST 45,     Additional lab work-up reviewed, white blood cell count 8.7 with 88% PMNs, normal electrolytes, serum creatinine 1.15, urinalysis reviewed    CT abdomen pelvis with IV contrast ordered    Discussed with patient concerns for his labs, likely gallstone pancreatitis, discussed getting CT abdomen pelvis, patient continued pain, morphine sulfate 2 mg IV ordered after noting systolic blood pressure 673V    IV fluid bolus ordered    CT radiology report reviewed    Patient received second dose pain medication and nausea medication    Case was discussed with Dr. Joanne Rasheed, surgery, regarding patient's presentation work-up, agrees with need for transfer to facility that can perform ERCP    Discussed with patient CT findings, discussed need for transfer to higher care facility for likely ERCP procedure, patient requested to talk over the phone with his wife as he states she does not want him to go back to [place], and follow-up shows patient prefers [facility] in [facility]    Case was discussed with [facility] transfer line, regarding patient's presentation and work-up, accepted for transfer to [address] [place] [address] Time: 35 minutes, including direct patient interaction, discussion with surgery, discussion with [facility] transfer line      FINAL IMPRESSION      1.  Gallstone pancreatitis DISPOSITION/PLAN   trn    PATIENT REFERRED TO:  No follow-up provider specified. DISCHARGE MEDICATIONS:  New Prescriptions    No medications on file           Summation      Patient Course:  trn    ED Medications administered this visit:    Medications   ondansetron (ZOFRAN) injection 4 mg (4 mg Intravenous Given 6/15/20 2109)   lidocaine (cardiac) (XYLOCAINE) 72.6 mg in sodium chloride 0.9 % 100 mL IVPB (0 mg/kg × 72.6 kg Intravenous Stopped 6/15/20 2134)   ketorolac (TORADOL) injection 30 mg (30 mg Intravenous Given 6/15/20 2134)   ondansetron (ZOFRAN) injection 4 mg (4 mg Intravenous Given 6/15/20 2134)   iopamidol (ISOVUE-370) 76 % injection 75 mL (75 mLs Intravenous Given 6/15/20 2215)   morphine sulfate (PF) injection 2 mg (2 mg Intravenous Given 6/15/20 2157)   0.9 % sodium chloride bolus (0 mLs Intravenous Stopped 6/15/20 2344)   ondansetron (ZOFRAN) injection 4 mg (4 mg Intravenous Given 6/16/20 0011)   morphine sulfate (PF) injection 2 mg (2 mg Intravenous Given 6/16/20 0012)       New Prescriptions from this visit:    New Prescriptions    No medications on file       Follow-up:  No follow-up provider specified. Final Impression:   1.  Gallstone pancreatitis               (Please note that portions of this note were completed with a voice recognition program.  Efforts were made to edit the dictations but occasionally words are mis-transcribed.)    MD Carina Galeano MD  06/16/20 9794

## 2020-06-25 ENCOUNTER — TELEPHONE (OUTPATIENT)
Dept: FAMILY MEDICINE CLINIC | Age: 85
End: 2020-06-25

## 2020-06-25 NOTE — TELEPHONE ENCOUNTER
[facility] Transitions Initial Follow Up Call    Outreach made within 2 business days of discharge: Yes    Patient: Philip Powell Patient : 1935   MRN: [id number]  Reason for Admission: There are no discharge diagnoses documented for the most recent discharge. Discharge Date: 20       Spoke with: 2020 @ 9:52 am  [facility]    Discharge department/facility: [address] [place] [address] Interactive Patient Contact:  Was patient able to fill all prescriptions: N/a  Was patient instructed to bring all medications to the follow-up visit:  n/a  Is patient taking all medications as directed in the discharge summary?  N/a was not given any  Does patient understand their discharge instructions: Yes   Does patient have questions or concerns that need addressed prior to 7-14 day follow up office visit:     Scheduled appointment with PCP within 7-14 days    Follow Up  Future Appointments   Date Time Provider Luz Marina Byrd   2020 10:30 AM MD Micki Bolton [facility]   2020  1:45 PM Nicolasa Alejandre MD [address]

## 2020-08-27 ENCOUNTER — OFFICE VISIT (OUTPATIENT)
Dept: FAMILY MEDICINE CLINIC | Age: 85
End: 2020-08-27
Payer: MEDICARE

## 2020-08-27 VITALS
WEIGHT: 155 LBS | HEART RATE: 60 BPM | SYSTOLIC BLOOD PRESSURE: 112 MMHG | HEIGHT: 70 IN | RESPIRATION RATE: 18 BRPM | BODY MASS INDEX: 22.19 KG/M2 | DIASTOLIC BLOOD PRESSURE: 68 MMHG | OXYGEN SATURATION: 97 %

## 2020-08-27 PROBLEM — K21.9 GASTROESOPHAGEAL REFLUX DISEASE WITHOUT ESOPHAGITIS: Status: ACTIVE | Noted: 2020-08-27

## 2020-08-27 PROCEDURE — 99214 OFFICE O/P EST MOD 30 MIN: CPT | Performed by: INTERNAL MEDICINE

## 2020-08-27 PROCEDURE — 1123F ACP DISCUSS/DSCN MKR DOCD: CPT | Performed by: INTERNAL MEDICINE

## 2020-08-27 PROCEDURE — G8427 DOCREV CUR MEDS BY ELIG CLIN: HCPCS | Performed by: INTERNAL MEDICINE

## 2020-08-27 PROCEDURE — 4040F PNEUMOC VAC/ADMIN/RCVD: CPT | Performed by: INTERNAL MEDICINE

## 2020-08-27 PROCEDURE — G8420 CALC BMI NORM PARAMETERS: HCPCS | Performed by: INTERNAL MEDICINE

## 2020-08-27 PROCEDURE — 1036F TOBACCO NON-USER: CPT | Performed by: INTERNAL MEDICINE

## 2020-08-27 ASSESSMENT — ENCOUNTER SYMPTOMS
CHEST TIGHTNESS: 0
ALLERGIC/IMMUNOLOGIC NEGATIVE: 1
SORE THROAT: 0
ABDOMINAL PAIN: 0
COUGH: 0
CONSTIPATION: 0
BLOOD IN STOOL: 0
WHEEZING: 0
NAUSEA: 0
SHORTNESS OF BREATH: 0

## 2020-08-27 NOTE — PROGRESS NOTES
problems. Past Medical History:     Past Medical History:   Diagnosis Date    [facility] [facility] ([facility]     CAD (coronary artery disease)     CVA (cerebral vascular accident) ([address] 05/11/2020    CVA (cerebral vascular accident) ([facility] 05/11/2020    Diverticula of colon     Hyperlipidemia     Hypertension     Kidney stone       Reviewed all health maintenance requirements and orderedappropriate tests  Health Maintenance Due   Topic Date Due    Lipid screen  09/09/2020       Past Surgical History:     Past Surgical History:   Procedure Laterality Date    ABDOMEN SURGERY      partial gastrectomy    ANGIOPLASTY  06/29/2016    Dr. Antonino Faith @ [place] [id number]  10/02/2018    Dr. Quentin Gonzalez @ [facility]-- Stent x 1    APPENDECTOMY      CARDIAC CATHETERIZATION Left 12/24/14    Dr. Rj Collins -2 vessel CAD-    CARDIAC CATHETERIZATION  5/4/2007    Dr Quentin Gonzalez: 1. Normal left ventricular systolic function with an estimated ejection fraction of 70%. 2. Elevated left ventricular end-diastolic pressure following angiographic dye load. 3. Double-vessel coronary artery disease with angiographic edivence of restenosis within the intervened-upon segment in the right coronary artery and moderately severe disease just proximal     CARDIAC CATHETERIZATION  5/4/07 con't    to the stented segment in the mid left anterior descending.  CARDIAC CATHETERIZATION  6/12/2008    Dr Blanco: 1. Moderate coronary artery disease, as described, with patent stents with evidence of mild at most in-stent restenosis of the right coronary artery. 2. Normal left ventricular systolic function.     CARDIAC SURGERY      13 stents, aorta and CAD    CHOLECYSTECTOMY      COLECTOMY      COLONOSCOPY      COLONOSCOPY  03-    DILATATION, ESOPHAGUS      EYE SURGERY Bilateral     cataracts    HERNIA REPAIR Right     inguinal    JOINT REPLACEMENT Right 1998 and 2012     knee (3rd surgery)    JOINT REPLACEMENT Right 2012    hip  [place] ESOPHAGOGASTRODUODENOSCOPY TRANSORAL DIAGNOSTIC N/A 6/14/2017    EGD ESOPHAGOGASTRODUODENOSCOPY; photos; bx's performed by Ciarra Barksdale MD at [address] [place] [address] [facility]  3/9/2011    Dr Katrin Good. Hyperdynamic left ventricular systolic function, estimated ejection fraction of 70%. 2. Normal left ventricular end-diastolic pressure. 3. Double-vessel coronary artery disease with angiographic disease progression involving the stented segment in the mid right coronary artery consistent with angiographic evidence of restenosis.  STOMACH SURGERY      ulcer, gastric bypass due to \"bleeding ulcer\", partial colectomy due to blockage    UPPER GASTROINTESTINAL ENDOSCOPY          Medications:       Prior to Admission medications    Medication Sig Start Date End Date Taking?  Authorizing Provider   apixaban (ELIQUIS) 5 MG TABS tablet Take by mouth 2 times daily   Yes Historical Provider, MD   lisinopril (PRINIVIL;ZESTRIL) 5 MG tablet Take 5 mg by mouth daily   Yes Historical Provider, MD   metoprolol succinate (TOPROL XL) 25 MG extended release tablet Take 25 mg by mouth daily   Yes Historical Provider, MD   naproxen (NAPROSYN) 375 MG tablet Take 375 mg by mouth 2 times daily as needed for Pain   Yes Historical Provider, MD   atorvastatin (LIPITOR) 80 MG tablet Take 80 mg by mouth daily   Yes Historical Provider, MD   propranolol (INDERAL) 80 MG tablet Take 1 tablet by mouth 3 times daily 3/3/20  Yes Nicky Castellanos MD   isosorbide mononitrate (IMDUR) 30 MG extended release tablet 1/2 tab PO daily  Patient taking differently: Take 30 mg by mouth daily  1/17/20  Yes Cooper Dover MD   ondansetron [facility]) 4 MG tablet Take 1 tablet by mouth every 6 hours as needed for Nausea 4/10/18  Yes Josiane Chol, APRN - CNP   docusate sodium (COLACE) 100 MG capsule Take 1 capsule by mouth 3 times daily as needed for Constipation 4/10/18  Yes Josiane Chol, APRN - CNP   gabapentin (NEURONTIN) 400 MG capsule Take 400 mg by mouth every evening   Yes Historical Provider, MD   omeprazole (PRILOSEC) 20 MG capsule Take 40 mg by mouth Daily    Yes Historical Provider, MD   vitamin D (CHOLECALCIFEROL) 1000 UNIT TABS tablet Take 3,000 Units by mouth daily    Yes Historical Provider, MD   clopidogrel (PLAVIX) 75 MG tablet Take 75 mg by mouth daily. Yes Historical Provider, MD   nitroGLYCERIN (NITROSTAT) 0.4 MG SL tablet Place 0.4 mg under the tongue every 5 minutes as needed. Yes Historical Provider, MD        Allergies:       Pcn [penicillins]; Penicillamine; and Ranolazine    Social History:     Tobacco: reports that he quit smoking about 53 years ago. He smoked 10.00 packs per day. He has never used smokeless tobacco.  Alcohol:      reports no history of alcohol use. Drug Use:  reports no history of drug use. Family History:     Family History   Problem Relation Age of Onset    Heart Attack Father     Heart Attack Brother        Review of Systems:         Review of Systems   Constitutional: Negative for activity change, appetite change, fatigue, unexpected weight change and weight loss. HENT: Negative for congestion, ear discharge, ear pain and sore throat. Eyes: Negative for visual disturbance. Respiratory: Negative for cough, chest tightness, shortness of breath and wheezing. Cardiovascular: Negative for chest pain, palpitations and leg swelling. Gastrointestinal: Negative for abdominal pain, blood in stool, constipation and nausea. Endocrine: Negative for cold intolerance, heat intolerance, polydipsia and polyuria. Genitourinary: Negative for difficulty urinating and dysuria. Musculoskeletal: Positive for neck pain. Negative for arthralgias and myalgias. Skin: Negative for rash. Allergic/Immunologic: Negative. Neurological: Negative for dizziness, tremors, syncope, weakness and headaches. Psychiatric/Behavioral: Negative for behavioral problems and dysphoric mood. The patient is not nervous/anxious. Physical Exam:     Vitals:  /68   Pulse 60   Resp 18   Ht 5' 10\" (1.778 m)   Wt 155 lb (70.3 kg)   SpO2 97%   BMI 22.24 kg/m²       Physical Exam  Vitals signs reviewed. Constitutional:       General: He is not in acute distress. Appearance: He is well-developed. HENT:      Head: Normocephalic and atraumatic. Right Ear: Tympanic membrane, ear canal and external ear normal.      Left Ear: Tympanic membrane, ear canal and external ear normal.      Nose: Nose normal. No congestion. Mouth/Throat:      Mouth: Mucous membranes are moist.      Pharynx: Oropharynx is clear. No oropharyngeal exudate. Eyes:      General: No scleral icterus. Right eye: No discharge. Left eye: No discharge. Conjunctiva/sclera: Conjunctivae normal.   Neck:      Thyroid: No thyromegaly. Cardiovascular:      Rate and Rhythm: Normal rate and regular rhythm. Heart sounds: Normal heart sounds. No murmur. Pulmonary:      Effort: Pulmonary effort is normal.      Breath sounds: Normal breath sounds. No wheezing or rales. Abdominal:      General: Bowel sounds are normal. There is no distension. Palpations: Abdomen is soft. There is no mass. Tenderness: There is no abdominal tenderness. Musculoskeletal: Normal range of motion. General: No tenderness. Right lower leg: No edema. Left lower leg: No edema. Lymphadenopathy:      Cervical: No cervical adenopathy. Skin:     General: Skin is warm and dry. Coloration: Skin is not pale. Findings: No rash. Neurological:      General: No focal deficit present. Mental Status: He is alert and oriented to person, place, and time. Mental status is at baseline.    Psychiatric:         Behavior: Behavior normal.         Judgment: Judgment normal.               Data:     Lab Results   Component Value Date     06/15/2020    K 3.8 06/15/2020     06/15/2020    CO2 22 06/15/2020    BUN 19 06/15/2020    CREATININE 1.15 06/15/2020    GLUCOSE 148 06/15/2020    GLUCOSE 110 11/15/2011    PROT 7.3 06/15/2020    LABALBU 4.2 06/15/2020    BILITOT 1.47 06/15/2020    ALKPHOS 218 06/15/2020     06/15/2020     06/15/2020     Lab Results   Component Value Date    WBC 8.7 06/15/2020    RBC 5.05 06/15/2020    RBC 4.51 11/15/2011    HGB 14.1 06/15/2020    HCT 41.8 06/15/2020    MCV 82.9 06/15/2020    MCH 27.9 06/15/2020    MCHC 33.7 06/15/2020    RDW 15.0 06/15/2020     06/15/2020     11/15/2011    MPV NOT REPORTED 06/15/2020     Lab Results   Component Value Date    TSH 1.61 03/03/2020     Lab Results   Component Value Date    CHOL 123 09/09/2019    HDL 46 09/09/2019    PSA 0.72 07/25/2017          Assessment & Plan        Diagnosis Orders   1. Essential hypertension   Blood pressure is well controlled, continue on lisinopril, Toprol-XL, Imdur    2. Hyperlipidemia, unspecified hyperlipidemia type  Lipid panel well-controlled, continue on atorvastatin 80 mg/day    3. Gastroesophageal reflux disease without esophagitis   Patient reports that symptoms are under control, continue on Prilosec                    Completed Refills   Requested Prescriptions      No prescriptions requested or ordered in this encounter     Return in about 6 months (around 2/27/2021) for HTN, hyperlipidemia, gerd. No orders of the defined types were placed in this encounter. No orders of the defined types were placed in this encounter. Patient Instructions     SURVEY:    You may be receiving a survey from [employer] regarding your visit today. Please complete the survey to enable us to provide the highest quality of care to you and your family. If you cannot score us a very good on any question, please call the office to discuss how we could have made your experience a very good one. Thank you.       Electronically signed by Anamaria Azar MD on 8/27/2020 at 12:29 PM           Completed Refills      Requested Prescriptions      No prescriptions requested or ordered in this encounter

## 2020-08-27 NOTE — PATIENT INSTRUCTIONS
SURVEY:    You may be receiving a survey from [employer] regarding your visit today. Please complete the survey to enable us to provide the highest quality of care to you and your family. If you cannot score us a very good on any question, please call the office to discuss how we could have made your experience a very good one. Thank you.

## 2020-09-01 ENCOUNTER — HOSPITAL ENCOUNTER (OUTPATIENT)
Age: 85
Discharge: HOME | End: 2020-09-01
Payer: MEDICARE

## 2020-09-01 LAB
ABSOLUTE EOS #: 0.2 K/UL (ref 0–0.4)
ABSOLUTE IMMATURE GRANULOCYTE: ABNORMAL K/UL (ref 0–0.3)
ABSOLUTE LYMPH #: 1.4 K/UL (ref 1–4.8)
ABSOLUTE MONO #: 0.7 K/UL (ref 0–1)
ALBUMIN SERPL-MCNC: 4.1 G/DL (ref 3.5–5.2)
ALBUMIN/GLOBULIN RATIO: ABNORMAL (ref 1–2.5)
ALP BLD-CCNC: 92 U/L (ref 40–129)
ALT SERPL-CCNC: 17 U/L (ref 5–41)
ANION GAP SERPL CALCULATED.3IONS-SCNC: 9 MMOL/L (ref 9–17)
AST SERPL-CCNC: 18 U/L
BASOPHILS # BLD: 0 % (ref 0–2)
BASOPHILS ABSOLUTE: 0 K/UL (ref 0–0.2)
BILIRUB SERPL-MCNC: 0.57 MG/DL (ref 0.3–1.2)
BUN BLDV-MCNC: 17 MG/DL (ref 8–23)
BUN/CREAT BLD: 14 (ref 9–20)
CALCIUM SERPL-MCNC: 9.8 MG/DL (ref 8.6–10.4)
CHLORIDE BLD-SCNC: 108 MMOL/L (ref 98–107)
CHOLESTEROL/HDL RATIO: 3.3
CHOLESTEROL: 130 MG/DL
CO2: 23 MMOL/L (ref 20–31)
CREAT SERPL-MCNC: 1.19 MG/DL (ref 0.7–1.2)
DIFFERENTIAL TYPE: YES
EOSINOPHILS RELATIVE PERCENT: 3 % (ref 0–5)
GFR AFRICAN AMERICAN: >60 ML/MIN
GFR NON-AFRICAN AMERICAN: 58 ML/MIN
GFR SERPL CREATININE-BSD FRML MDRD: ABNORMAL ML/MIN/{1.73_M2}
GFR SERPL CREATININE-BSD FRML MDRD: ABNORMAL ML/MIN/{1.73_M2}
GLUCOSE BLD-MCNC: 150 MG/DL (ref 70–99)
HCT VFR BLD CALC: 40 % (ref 41–53)
HDLC SERPL-MCNC: 39 MG/DL
HEMOGLOBIN: 13.3 G/DL (ref 13.5–17.5)
IMMATURE GRANULOCYTES: ABNORMAL %
LDL CHOLESTEROL: 72 MG/DL (ref 0–130)
LYMPHOCYTES # BLD: 21 % (ref 13–44)
MAGNESIUM: 2.2 MG/DL (ref 1.6–2.6)
MCH RBC QN AUTO: 27.4 PG (ref 26–34)
MCHC RBC AUTO-ENTMCNC: 33.4 G/DL (ref 31–37)
MCV RBC AUTO: 82 FL (ref 80–100)
MONOCYTES # BLD: 10 % (ref 5–9)
NRBC AUTOMATED: ABNORMAL PER 100 WBC
PATIENT FASTING?: NO
PDW BLD-RTO: 15.7 % (ref 12.1–15.2)
PLATELET # BLD: 309 K/UL (ref 140–450)
PLATELET ESTIMATE: ABNORMAL
PMV BLD AUTO: ABNORMAL FL (ref 6–12)
POTASSIUM SERPL-SCNC: 4.2 MMOL/L (ref 3.7–5.3)
RBC # BLD: 4.88 M/UL (ref 4.5–5.9)
RBC # BLD: ABNORMAL 10*6/UL
SEG NEUTROPHILS: 66 % (ref 39–75)
SEGMENTED NEUTROPHILS ABSOLUTE COUNT: 4.4 K/UL (ref 2.1–6.5)
SODIUM BLD-SCNC: 140 MMOL/L (ref 135–144)
TOTAL PROTEIN: 6.7 G/DL (ref 6.4–8.3)
TRIGL SERPL-MCNC: 94 MG/DL
TSH SERPL DL<=0.05 MIU/L-ACNC: 1.49 MIU/L (ref 0.3–5)
VITAMIN D 25-HYDROXY: 44.1 NG/ML (ref 30–100)
VLDLC SERPL CALC-MCNC: ABNORMAL MG/DL (ref 1–30)
WBC # BLD: 6.7 K/UL (ref 3.5–11)
WBC # BLD: ABNORMAL 10*3/UL

## 2020-09-01 PROCEDURE — 83735 ASSAY OF MAGNESIUM: CPT

## 2020-09-01 PROCEDURE — 82306 VITAMIN D 25 HYDROXY: CPT

## 2020-09-01 PROCEDURE — 85025 COMPLETE CBC W/AUTO DIFF WBC: CPT

## 2020-09-01 PROCEDURE — 84443 ASSAY THYROID STIM HORMONE: CPT

## 2020-09-01 PROCEDURE — 80061 LIPID PANEL: CPT

## 2020-09-01 PROCEDURE — 36415 COLL VENOUS BLD VENIPUNCTURE: CPT

## 2020-09-01 PROCEDURE — 93005 ELECTROCARDIOGRAM TRACING: CPT

## 2020-09-01 PROCEDURE — 80053 COMPREHEN METABOLIC PANEL: CPT

## 2020-09-02 ENCOUNTER — OFFICE VISIT (OUTPATIENT)
Dept: CARDIOLOGY CLINIC | Age: 85
End: 2020-09-02
Payer: MEDICARE

## 2020-09-02 VITALS
SYSTOLIC BLOOD PRESSURE: 70 MMHG | DIASTOLIC BLOOD PRESSURE: 60 MMHG | HEART RATE: 70 BPM | WEIGHT: 164 LBS | BODY MASS INDEX: 23.53 KG/M2 | OXYGEN SATURATION: 97 %

## 2020-09-02 LAB
EKG ATRIAL RATE: 59 BPM
EKG P AXIS: 56 DEGREES
EKG P-R INTERVAL: 212 MS
EKG Q-T INTERVAL: 440 MS
EKG QRS DURATION: 138 MS
EKG QTC CALCULATION (BAZETT): 435 MS
EKG R AXIS: -43 DEGREES
EKG T AXIS: 21 DEGREES
EKG VENTRICULAR RATE: 59 BPM

## 2020-09-02 PROCEDURE — G8420 CALC BMI NORM PARAMETERS: HCPCS | Performed by: INTERNAL MEDICINE

## 2020-09-02 PROCEDURE — 1036F TOBACCO NON-USER: CPT | Performed by: INTERNAL MEDICINE

## 2020-09-02 PROCEDURE — 93010 ELECTROCARDIOGRAM REPORT: CPT | Performed by: INTERNAL MEDICINE

## 2020-09-02 PROCEDURE — 99214 OFFICE O/P EST MOD 30 MIN: CPT | Performed by: INTERNAL MEDICINE

## 2020-09-02 PROCEDURE — 1123F ACP DISCUSS/DSCN MKR DOCD: CPT | Performed by: INTERNAL MEDICINE

## 2020-09-02 PROCEDURE — G8427 DOCREV CUR MEDS BY ELIG CLIN: HCPCS | Performed by: INTERNAL MEDICINE

## 2020-09-02 PROCEDURE — 4040F PNEUMOC VAC/ADMIN/RCVD: CPT | Performed by: INTERNAL MEDICINE

## 2020-09-02 NOTE — PROGRESS NOTES
Ov DR Janie Yu   Was in [facility] in June  Had ERCP pancreatic stent  In May was in [facility]   had CVA lt sided weakness  Almost all lt side has returned   To normal    No chest pain   Still has some discomfort   From incision chest.  Some sob now new  No ankle edema. occ dizziness when stands  Up quick. Bedside echo done. Will see in 6 mths.

## 2020-09-02 NOTE — LETTER
Vasyl Peters M.D. [address] [place] [address], [facility]   [phone]        2020        Michael Zhou MD  [address] [facility], [address] [facility]    RE:   Yovani Momin  :  1935    Dear Dr. Orpha Apgar:    CHIEF COMPLAINT:  1.  Status post CVA on 2020, with left-sided weakness, with subcortical infarct in the right frontal lobe as well as precentral and postcentral gyri of the medial right frontal and parietal lobes, consistent with an embolic phenomenon. 2.  Coronary artery disease. 3.  Status post hospitalization at [facility] from 2020 to 2020, for gallstone pancreatitis status post ERCP with stent placement and a biliary sphincterotomy with balloon extraction with stone debris swept with the CBD successfully dilated. 4.  Canceled an implantable loop recorder because of his pancreatitis. HISTORY OF PRESENT ILLNESS:  I had the pleasure of seeing Mr. Phylicia Rodriguez in the office on 2020. He is an 72-year-old gentleman, who is very complex and has a history of severe CAD. He had a catheterization on 2001, where he had 50% LAD, 80% RCA, unremarkable circumflex, EF of 50% with angioplasty of the posterior ventricular branch of the right coronary artery, using a Tetra 3.0 x 13 mm bare-metal stent and angioplasty of the PDA with balloon alone. He had a Bx 3.5 x 18 mm stent placed in the mid right coronary artery and a 3.5 x 18 mm Velocity stent placed in the proximal right coronary artery. He had multiple episodes of restenosis and had a stent placed in the LAD in , with restenosis and brachytherapy. On 2007, a catheterization by Dr. Wylie showed severe disease in the right coronary artery, in which a 3.5 x 24 mm Taxus stent was placed with also angioplasty of the LAD, placing a 2.75 mm x 12 mm Taxus stent.     On 2011, he had 80% in-stent stenosis of the right coronary artery with brachytherapy and placement of a 4.0 x 28 mm Promus stent. On 06/29/2016, a catheterization showed 50% in-stent stenosis of the right coronary artery, 50% LAD, 80% mid LAD, with LAD stented with a 3.5 x 38 mm Resolute stent. On 10/02/2018, a 4.0 x 22 mm Synergy stent was placed in the LAD. On 06/17/2019, a catheterization showed severe critical left main trunk disease, with 75% LAD and ostium of the circumflex, right coronary artery had 80% disease, EF of 65%. This resulted in an open heart surgery by Dr. Tatum Cabrera on 06/24/2019, with a LIMA to LAD, a vein graft to the PDA, and a vein graft to the OM branch of the circumflex. He had postoperative atrial fibrillation with RVR, treated with amiodarone. On 05/11/2020, he developed weakness in the left lower extremity and left arm. A CTA at [facility] in [place] showed occlusion on the distal intradural segment of the right vertebral lobe with a right MCA territorial stroke with ischemic infarct. It was felt that his emboli would be occult atrial fibrillation and he was placed on Eliquis 5 mg b.i.d. along with Plavix and aspirin. He was to wear a 30-day event recorder; however, because of marked chest tenderness and sensitivity to patches, he could not wear his event recorder. I brought him in on 06/03/2020. He was walking with a cane. His strength was improving. He was instructed by [facility] to stay on Plavix, aspirin, and Eliquis for 1 month, then stop aspirin and remain on Plavix and Eliquis. We did talk about implantable loop recorder to monitor for atrial fibrillation, which he agreed to do. However, he developed pancreatitis secondary to biliary obstruction and was hospitalized at [facility] from 06/16 to 06/23. At that time, he had 3 endoscopies with CBD dilatation and stone removal with resolution of his pancreatis. When I see him today, he is doing well.   He walks with a cane but actually is able to ambulate quite well. He denies any syncope or near syncope. No lightheadedness or dizziness. He really has no complaints as I see him today. He is recovering from his hospitalization. Denies any exertional chest pain. Does have some incisional pain. He had some shortness of breath. Denies edema. Has some dizziness when he stands up quickly. He has had no further hospitalizations. Again, his strength is improving. He feels he is almost back to baseline. CARDIAC RISK FACTORS:  Known CAD: Positive. PTCA:  Positive. Open Heart Surgery:  Positive. Hypertension:  Positive. Hyperlipidemia:  Positive. Other Family Members:  Positive. Peripheral Vascular Disease:  Negative. Diabetes:  Negative. Smoking:  Negative. MEDICATIONS AT HOME:  He is currently on Eliquis 5 mg b.i.d., Lipitor 80 mg daily, Plavix 75 mg daily, Neurontin 400 mg daily, Imdur 30 mg daily, lisinopril 5 mg daily, Toprol-XL 25 mg daily, Naprosyn p.r.n., Prilosec 20 mg daily, Zofran 4 mg p.r.n., Inderal 80 mg t.i.d., vitamin D 3000 units daily. PAST MEDICAL AND SURGICAL HISTORY:  1. Cardiac as above, with a total of 15 stents and open heart surgery on 2019.  2.  Right knee surgery on 2013. 3.  Hypertension. 4.  Hyperlipidemia. 5.  Right knee replacement. 6.  COPD. 7.  Ureteral stones. 8.  Bilateral inguinal hernia repair. 9.  Large ventral hernia, which has been painful and not repaired. 10.  Cervical disk disease. 11.  CVA on 2020, with mild residual left-sided weakness. 12.  Status post CBD dilatation secondary to pancreatitis. FAMILY HISTORY:  Father  of MI at 76. Brother  of MI at 54. Sister  at 67 of an MI.    SOCIAL HISTORY:  He is 82years old. . Three sons, with one being a  in [facility], another son has traumatic stress syndrome along with parkinsonism, and one son is an advisor to a company.   He does

## 2020-09-16 NOTE — PROGRESS NOTES
Rosetta Kilpatrick M.D. [address] [place] [address], Marie   [phone]        2020        Dinorah Marmolejo MD  [address] [facility], [address] [facility]    RE:   Mariane Osgood  :  1935    Dear Dr. Mimi Goins:    CHIEF COMPLAINT:  1.  Status post CVA on 2020, with left-sided weakness, with subcortical infarct in the right frontal lobe as well as precentral and postcentral gyri of the medial right frontal and parietal lobes, consistent with an embolic phenomenon. 2.  Coronary artery disease. 3.  Status post hospitalization at [facility] from 2020 to 2020, for gallstone pancreatitis status post ERCP with stent placement and a biliary sphincterotomy with balloon extraction with stone debris swept with the CBD successfully dilated. 4.  Canceled an implantable loop recorder because of his pancreatitis. HISTORY OF PRESENT ILLNESS:  I had the pleasure of seeing Mr. Lindy Martini in the office on 2020. He is an 72-year-old gentleman, who is very complex and has a history of severe CAD. He had a catheterization on 2001, where he had 50% LAD, 80% RCA, unremarkable circumflex, EF of 50% with angioplasty of the posterior ventricular branch of the right coronary artery, using a Tetra 3.0 x 13 mm bare-metal stent and angioplasty of the PDA with balloon alone. He had a Bx 3.5 x 18 mm stent placed in the mid right coronary artery and a 3.5 x 18 mm Velocity stent placed in the proximal right coronary artery. He had multiple episodes of restenosis and had a stent placed in the LAD in , with restenosis and brachytherapy. On 2007, a catheterization by Dr. Shaheen Barreto showed severe disease in the right coronary artery, in which a 3.5 x 24 mm Taxus stent was placed with also angioplasty of the LAD, placing a 2.75 mm x 12 mm Taxus stent.     On 2011, he had 80% in-stent stenosis of the right coronary artery with brachytherapy and placement of a 4.0 x 28 mm Promus stent. On 06/29/2016, a catheterization showed 50% in-stent stenosis of the right coronary artery, 50% LAD, 80% mid LAD, with LAD stented with a 3.5 x 38 mm Resolute stent. On 10/02/2018, a 4.0 x 22 mm Synergy stent was placed in the LAD. On 06/17/2019, a catheterization showed severe critical left main trunk disease, with 75% LAD and ostium of the circumflex, right coronary artery had 80% disease, EF of 65%. This resulted in an open heart surgery by Dr. Tara Milton on 06/24/2019, with a LIMA to LAD, a vein graft to the PDA, and a vein graft to the OM branch of the circumflex. He had postoperative atrial fibrillation with RVR, treated with amiodarone. On 05/11/2020, he developed weakness in the left lower extremity and left arm. A CTA at [facility] in [place] showed occlusion on the distal intradural segment of the right vertebral lobe with a right MCA territorial stroke with ischemic infarct. It was felt that his emboli would be occult atrial fibrillation and he was placed on Eliquis 5 mg b.i.d. along with Plavix and aspirin. He was to wear a 30-day event recorder; however, because of marked chest tenderness and sensitivity to patches, he could not wear his event recorder. I brought him in on 06/03/2020. He was walking with a cane. His strength was improving. He was instructed by [facility] to stay on Plavix, aspirin, and Eliquis for 1 month, then stop aspirin and remain on Plavix and Eliquis. We did talk about implantable loop recorder to monitor for atrial fibrillation, which he agreed to do. However, he developed pancreatitis secondary to biliary obstruction and was hospitalized at [facility] from 06/16 to 06/23. At that time, he had 3 endoscopies with CBD dilatation and stone removal with resolution of his pancreatis. When I see him today, he is doing well. He walks with a cane but actually is able to ambulate quite well.   He denies any syncope or near syncope. No lightheadedness or dizziness. He really has no complaints as I see him today. He is recovering from his hospitalization. Denies any exertional chest pain. Does have some incisional pain. He had some shortness of breath. Denies edema. Has some dizziness when he stands up quickly. He has had no further hospitalizations. Again, his strength is improving. He feels he is almost back to baseline. CARDIAC RISK FACTORS:  Known CAD: Positive. PTCA:  Positive. Open Heart Surgery:  Positive. Hypertension:  Positive. Hyperlipidemia:  Positive. Other Family Members:  Positive. Peripheral Vascular Disease:  Negative. Diabetes:  Negative. Smoking:  Negative. MEDICATIONS AT HOME:  He is currently on Eliquis 5 mg b.i.d., Lipitor 80 mg daily, Plavix 75 mg daily, Neurontin 400 mg daily, Imdur 30 mg daily, lisinopril 5 mg daily, Toprol-XL 25 mg daily, Naprosyn p.r.n., Prilosec 20 mg daily, Zofran 4 mg p.r.n., Inderal 80 mg t.i.d., vitamin D 3000 units daily. PAST MEDICAL AND SURGICAL HISTORY:  1. Cardiac as above, with a total of 15 stents and open heart surgery on 2019.  2.  Right knee surgery on 2013. 3.  Hypertension. 4.  Hyperlipidemia. 5.  Right knee replacement. 6.  COPD. 7.  Ureteral stones. 8.  Bilateral inguinal hernia repair. 9.  Large ventral hernia, which has been painful and not repaired. 10.  Cervical disk disease. 11.  CVA on 2020, with mild residual left-sided weakness. 12.  Status post CBD dilatation secondary to pancreatitis. FAMILY HISTORY:  Father  of MI at 76. Brother  of MI at 54. Sister  at 67 of an MI.    SOCIAL HISTORY:  He is 82years old. . Three sons, with one being a  in [place], another son has traumatic stress syndrome along with parkinsonism, and one son is an advisor to a company. He does not smoke or drink alcohol.   His wife had surgery in her foot again of whom he is the main caretaker. He does walk with a cane. REVIEW OF SYSTEMS:  Cardiac as above. Other systems reviewed including constitutional, eyes, ears, nose and throat, cardiovascular, respiratory, GI, , musculoskeletal, integumentary, neurologic, endocrine, hematologic and allergic/immunologic are negative except for what is described above. No weight loss or weight gain. No change in bowel habits, no blood in stools. No fevers, sweats or chills. PHYSICAL EXAMINATION:  VITAL SIGNS:  His blood pressure was 110/70 in the left arm and 70/60 in the right arm, heart rate 70 and regular. Respiratory rate 18. O2 saturation 97%. Weight 164 pounds. GENERAL:  He is a pleasant 82year-old gentleman. Denied pain. He was oriented to person, place and time. Answered questions appropriately. SKIN:  No unusual skin changes. HEENT:  The pupils are equally round and reactive to light and accommodation. Extraocular movements were intact. Mucous membranes were dry. NECK:  No JVD. Good carotid pulses. No carotid bruits. No lymphadenopathy or thyromegaly. CARDIOVASCULAR EXAM:  S1 and S2 were normal.  No S3 or S4. Soft systolic blowing type murmur. No diastolic murmur. PMI was normal.  No lift, thrust, or pericardial friction rub. LUNGS:  Quite clear to auscultation and percussion. ABDOMEN:  Soft and nontender. Good bowel sounds. EXTREMITIES:  Good femoral pulses. Good pedal pulses. No pedal edema. Skin was warm and dry. No calf tenderness. Nail beds pink. Good cap refill. PULSES:  Bilateral symmetrical radial, brachial and carotid pulses. No carotid bruits. Good femoral and pedal pulses. NEUROLOGIC EXAM:  Within normal limits. PSYCHIATRIC EXAM:  Within normal limits. LABORATORY DATA:  From 09/01/2020, his sodium was 140, potassium 4.2, BUN 17, creatinine 1.19, magnesium 2.2, glucose 150, calcium was 9.8. Cholesterol 130, HDL was 39, LDL 72, triglycerides 94. ALT was 17, AST was 18.   TSH highly suggested with multiple emboli causing his CVA. 3.  We will see in 6 months. DISCUSSION:  Mr. Abhijeet Ba overall is doing well. He has made a good recovery from his hospitalization from his pancreatitis. We were initially planning on implanting a loop recorder; however, there is a plan to continue Eliquis at this time. In one year, if there is a consideration for stopping Eliquis, we could again revisit the thought of placing an implantable loop recorder to monitor for atrial fibrillation. He has an amazingly good attitude. He is under much stress at home taking care of his wife. However, he is maintaining nicely and looks about the best I have seen him look now for quite sometime. Thank you very much for allowing me the privilege of seeing Mr. Abhijeet Ba. If you have any questions on my thoughts, please do not hesitate to contact me.     Sincerely,        Genesis Vera    D: 09/03/2020 5:52:31     T: 09/03/2020 10:00:03     [email or web address]  Job#: [id number]   Doc#: [id number]

## 2020-09-29 ENCOUNTER — NURSE ONLY (OUTPATIENT)
Dept: FAMILY MEDICINE CLINIC | Age: 85
End: 2020-09-29
Payer: MEDICARE

## 2020-09-29 PROCEDURE — 90686 IIV4 VACC NO PRSV 0.5 ML IM: CPT | Performed by: INTERNAL MEDICINE

## 2020-09-29 PROCEDURE — G0008 ADMIN INFLUENZA VIRUS VAC: HCPCS | Performed by: INTERNAL MEDICINE

## 2021-04-01 ENCOUNTER — OFFICE VISIT (OUTPATIENT)
Dept: FAMILY MEDICINE CLINIC | Age: 86
End: 2021-04-01
Payer: MEDICARE

## 2021-04-01 VITALS
TEMPERATURE: 97.4 F | SYSTOLIC BLOOD PRESSURE: 138 MMHG | BODY MASS INDEX: 24.88 KG/M2 | HEART RATE: 61 BPM | WEIGHT: 173.4 LBS | DIASTOLIC BLOOD PRESSURE: 86 MMHG | OXYGEN SATURATION: 98 %

## 2021-04-01 DIAGNOSIS — F32.A DEPRESSION, UNSPECIFIED DEPRESSION TYPE: ICD-10-CM

## 2021-04-01 DIAGNOSIS — I25.10 CORONARY ARTERY DISEASE INVOLVING NATIVE HEART, ANGINA PRESENCE UNSPECIFIED, UNSPECIFIED VESSEL OR LESION TYPE: ICD-10-CM

## 2021-04-01 DIAGNOSIS — K21.9 GASTROESOPHAGEAL REFLUX DISEASE WITHOUT ESOPHAGITIS: ICD-10-CM

## 2021-04-01 DIAGNOSIS — I48.91 ATRIAL FIBRILLATION, UNSPECIFIED TYPE (HCC): ICD-10-CM

## 2021-04-01 DIAGNOSIS — I10 ESSENTIAL HYPERTENSION: Primary | ICD-10-CM

## 2021-04-01 DIAGNOSIS — I63.411 CEREBROVASCULAR ACCIDENT (CVA) DUE TO EMBOLISM OF RIGHT MIDDLE CEREBRAL ARTERY (HCC): ICD-10-CM

## 2021-04-01 DIAGNOSIS — E78.5 HYPERLIPIDEMIA, UNSPECIFIED HYPERLIPIDEMIA TYPE: ICD-10-CM

## 2021-04-01 PROBLEM — R53.1 WEAKNESS: Status: RESOLVED | Noted: 2021-04-01 | Resolved: 2021-04-01

## 2021-04-01 PROBLEM — M15.9 GENERALIZED OSTEOARTHRITIS: Status: ACTIVE | Noted: 2021-04-01

## 2021-04-01 PROBLEM — I11.9 HYPERTENSIVE HEART DISEASE: Status: ACTIVE | Noted: 2021-04-01

## 2021-04-01 PROBLEM — M25.519 SHOULDER PAIN: Status: ACTIVE | Noted: 2021-04-01

## 2021-04-01 PROBLEM — K59.00 ACUTE CONSTIPATION: Status: ACTIVE | Noted: 2018-04-10

## 2021-04-01 PROBLEM — I11.9 HYPERTENSIVE HEART DISEASE: Status: RESOLVED | Noted: 2021-04-01 | Resolved: 2021-04-01

## 2021-04-01 PROBLEM — F41.9 MILD ANXIETY: Status: ACTIVE | Noted: 2021-04-01

## 2021-04-01 PROBLEM — B02.29 POSTHERPETIC NEURALGIA: Status: ACTIVE | Noted: 2021-04-01

## 2021-04-01 PROBLEM — R53.1 WEAKNESS: Status: ACTIVE | Noted: 2021-04-01

## 2021-04-01 PROBLEM — G89.18 POSTOPERATIVE PAIN: Status: RESOLVED | Noted: 2021-04-01 | Resolved: 2021-04-01

## 2021-04-01 PROBLEM — K43.9 ABDOMINAL WALL HERNIA: Status: ACTIVE | Noted: 2021-04-01

## 2021-04-01 PROBLEM — F41.9 MILD ANXIETY: Status: RESOLVED | Noted: 2021-04-01 | Resolved: 2021-04-01

## 2021-04-01 PROBLEM — B02.29 POSTHERPETIC NEURALGIA: Status: RESOLVED | Noted: 2021-04-01 | Resolved: 2021-04-01

## 2021-04-01 PROBLEM — F43.10 POSTTRAUMATIC STRESS DISORDER: Status: ACTIVE | Noted: 2021-04-01

## 2021-04-01 PROBLEM — J98.11 PULMONARY ATELECTASIS: Status: ACTIVE | Noted: 2021-04-01

## 2021-04-01 PROBLEM — G89.18 POSTOPERATIVE PAIN: Status: ACTIVE | Noted: 2021-04-01

## 2021-04-01 PROCEDURE — G8427 DOCREV CUR MEDS BY ELIG CLIN: HCPCS | Performed by: INTERNAL MEDICINE

## 2021-04-01 PROCEDURE — G8420 CALC BMI NORM PARAMETERS: HCPCS | Performed by: INTERNAL MEDICINE

## 2021-04-01 PROCEDURE — 1036F TOBACCO NON-USER: CPT | Performed by: INTERNAL MEDICINE

## 2021-04-01 PROCEDURE — 4040F PNEUMOC VAC/ADMIN/RCVD: CPT | Performed by: INTERNAL MEDICINE

## 2021-04-01 PROCEDURE — 99214 OFFICE O/P EST MOD 30 MIN: CPT | Performed by: INTERNAL MEDICINE

## 2021-04-01 PROCEDURE — 1123F ACP DISCUSS/DSCN MKR DOCD: CPT | Performed by: INTERNAL MEDICINE

## 2021-04-01 RX ORDER — SERTRALINE HYDROCHLORIDE 25 MG/1
25 TABLET, FILM COATED ORAL DAILY
Qty: 30 TABLET | Refills: 3 | Status: SHIPPED | OUTPATIENT
Start: 2021-04-01 | End: 2022-01-06 | Stop reason: ALTCHOICE

## 2021-04-01 ASSESSMENT — PATIENT HEALTH QUESTIONNAIRE - PHQ9
1. LITTLE INTEREST OR PLEASURE IN DOING THINGS: 0
SUM OF ALL RESPONSES TO PHQ9 QUESTIONS 1 & 2: 0
SUM OF ALL RESPONSES TO PHQ QUESTIONS 1-9: 0

## 2021-04-01 ASSESSMENT — ENCOUNTER SYMPTOMS
COUGH: 0
CONSTIPATION: 0
SHORTNESS OF BREATH: 0
BLOOD IN STOOL: 0
ALLERGIC/IMMUNOLOGIC NEGATIVE: 1
SORE THROAT: 0
ABDOMINAL PAIN: 0
CHEST TIGHTNESS: 0
WHEEZING: 0
NAUSEA: 0

## 2021-04-01 NOTE — PATIENT INSTRUCTIONS
SURVEY:    You may be receiving a survey from [employer] regarding your visit today. Please complete the survey to enable us to provide the highest quality of care to you and your family. If you cannot score us a very good on any question, please call the office to discuss how we could of made your experience a very good one. Thank you. You may be receiving a survey from [employer] regarding your visit today. You may get this in the mail, through your MyChart or in your email. Please complete the survey to enable us to provide the highest quality of care to you and your family. If you cannot score us as very good ( 5 Stars) on any question, please feel free to call the office to discuss how we could have made your experience exceptional.     Thank You!       MD Mathew Rainey, HUMBERTO Gonzalez, PCA

## 2021-04-01 NOTE — PROGRESS NOTES
HPI Notes    Name: Talib   : 1935         Chief Complaint:     Chief Complaint   Patient presents with    Hypertension     Patient presents today for 6 month check up    Hyperlipidemia    Gastroesophageal Reflux       History of Present Illness:        Lucien Mayer presents to office to follow-up for hypertension, hyperlipidemia, GERD    He thinks he is getting depressed. Lucien Mayer presents with the complaint of depression. Current symptoms of depression include anhedonia, depressed mood, fatigue and impaired memory. Lucien Myaer has felt depressed for more than a year . Lucien Mayer does not have a history of depression. There is not a family history of depression or anxiety. Current identifiable stressors include health problems, covid pandemic, isolation. Lucien Mayer  denies a  sleep disturbance and sleeps 7-8 hours per night. Lucien Mayer denies the use of drugs or alcohol. Lucien Mayer denies current suicidal ideation. Hypertension  This is a chronic problem. The current episode started more than 1 year ago. The problem is unchanged. The problem is controlled. Pertinent negatives include no anxiety, chest pain, headaches, palpitations, peripheral edema or shortness of breath. There are no associated agents to hypertension. Risk factors for coronary artery disease include diabetes mellitus, dyslipidemia and male gender. Past treatments include angiotensin blockers, beta blockers and direct vasodilators. The current treatment provides significant improvement. There are no compliance problems. Hypertensive end-organ damage includes CAD/MI and CVA. There is no history of kidney disease. Hyperlipidemia  This is a chronic problem. The current episode started more than 1 year ago. The problem is controlled. Pertinent negatives include no chest pain, myalgias or shortness of breath. Current antihyperlipidemic treatment includes statins. The current treatment provides significant improvement of lipids.  There are no compliance problems. Gastroesophageal Reflux  He reports no abdominal pain, no chest pain, no coughing, no nausea, no sore throat or no wheezing. This is a chronic problem. The current episode started more than 1 year ago. The problem occurs occasionally. The problem has been unchanged. The symptoms are aggravated by certain foods and lying down. Pertinent negatives include no fatigue. He has tried a PPI for the symptoms. The treatment provided significant relief. Past procedures include an EGD. Past Medical History:     Past Medical History:   Diagnosis Date    [facility]inal pain ([facility] [address]     CAD (coronary artery disease)     CVA (cerebral vascular accident) ([facility] [address] 05/11/2020    CVA (cerebral vascular accident) ([facility] [address] 05/11/2020    Diverticula of colon     Hyperlipidemia     Hypertension     Kidney stone       Reviewed all health maintenance requirements and orderedappropriate tests  There are no preventive care reminders to display for this patient. Past Surgical History:     Past Surgical History:   Procedure Laterality Date    ABDOMEN SURGERY      partial gastrectomy    ANGIOPLASTY  06/29/2016    Dr. Clarisa Bo @ [place] [id number]  10/02/2018    Dr. Hussein Walters @ [facility]-- Stent x 1    APPENDECTOMY      CARDIAC CATHETERIZATION Left 12/24/14    Dr. Jasper Harrison -2 vessel CAD-    CARDIAC CATHETERIZATION  5/4/2007    Dr Hussein Walters: 1. Normal left ventricular systolic function with an estimated ejection fraction of 70%. 2. Elevated left ventricular end-diastolic pressure following angiographic dye load. 3. Double-vessel coronary artery disease with angiographic edivence of restenosis within the intervened-upon segment in the right coronary artery and moderately severe disease just proximal     CARDIAC CATHETERIZATION  5/4/07 con't    to the stented segment in the mid left anterior descending.  CARDIAC CATHETERIZATION  6/12/2008    Dr Blanco: 1.  Moderate coronary artery disease, as described, Take 1 tablet by mouth 3 times daily 3/3/20  Yes Nereida Patiño MD   isosorbide mononitrate (IMDUR) 30 MG extended release tablet 1/2 tab PO daily  Patient taking differently: Take 30 mg by mouth daily  1/17/20  Yes Hortencia Rivera MD   ondansetron [facility]) 4 MG tablet Take 1 tablet by mouth every 6 hours as needed for Nausea 4/10/18  Yes REMY Enamorado CNP   docusate sodium (COLACE) 100 MG capsule Take 1 capsule by mouth 3 times daily as needed for Constipation 4/10/18  Yes REMY Enamorado CNP   gabapentin (NEURONTIN) 400 MG capsule Take 400 mg by mouth every evening   Yes Historical Provider, MD   omeprazole (PRILOSEC) 20 MG capsule Take 40 mg by mouth Daily    Yes Historical Provider, MD   vitamin D (CHOLECALCIFEROL) 1000 UNIT TABS tablet Take 3,000 Units by mouth daily    Yes Historical Provider, MD   clopidogrel (PLAVIX) 75 MG tablet Take 75 mg by mouth daily. Yes Historical Provider, MD   nitroGLYCERIN (NITROSTAT) 0.4 MG SL tablet Place 0.4 mg under the tongue every 5 minutes as needed. Yes Historical Provider, MD        Allergies:       Pcn [penicillins], Penicillamine, and Ranolazine    Social History:     Tobacco: reports that he quit smoking about 54 years ago. He smoked 10.00 packs per day. He has never used smokeless tobacco.  Alcohol:      reports no history of alcohol use. Drug Use:  reports no history of drug use. Family History:     Family History   Problem Relation Age of Onset    Heart Attack Father     Heart Attack Brother        Review of Systems:         Review of Systems   Constitutional: Negative for activity change, appetite change, fatigue and unexpected weight change. HENT: Negative for congestion, ear discharge, ear pain and sore throat. Eyes: Negative for visual disturbance. Respiratory: Negative for cough, chest tightness, shortness of breath and wheezing. Cardiovascular: Negative for chest pain, palpitations and leg swelling. Gastrointestinal: Negative for abdominal pain, blood in stool, constipation and nausea. Endocrine: Negative for cold intolerance, heat intolerance, polydipsia and polyuria. Genitourinary: Negative for difficulty urinating and dysuria. Musculoskeletal: Negative. Negative for myalgias. Skin: Negative for rash. Allergic/Immunologic: Negative. Neurological: Positive for weakness (generalized). Negative for headaches. Psychiatric/Behavioral: Positive for dysphoric mood. Negative for behavioral problems, confusion, sleep disturbance and suicidal ideas. The patient is not nervous/anxious. Physical Exam:     Vitals:  /86 (Site: Left Upper Arm, Position: Sitting, Cuff Size: Medium Adult)   Pulse 61   Temp 97.4 °F (36.3 °C)   Wt 173 lb 6.4 oz (78.7 kg)   SpO2 98%   BMI 24.88 kg/m²       Physical Exam  Vitals signs reviewed. Constitutional:       General: He is not in acute distress. Appearance: He is well-developed. He is not ill-appearing. HENT:      Head: Normocephalic and atraumatic. Eyes:      General:         Right eye: No discharge. Left eye: No discharge. Neck:      Thyroid: No thyromegaly. Cardiovascular:      Rate and Rhythm: Normal rate and regular rhythm. Heart sounds: Normal heart sounds. No murmur. Pulmonary:      Effort: Pulmonary effort is normal.      Breath sounds: Normal breath sounds. No wheezing or rales. Abdominal:      General: Bowel sounds are normal. There is no distension. Palpations: Abdomen is soft. There is no mass. Tenderness: There is no abdominal tenderness. Musculoskeletal: Normal range of motion. Right lower leg: No edema. Left lower leg: No edema. Lymphadenopathy:      Cervical: No cervical adenopathy. Skin:     General: Skin is warm and dry. Coloration: Skin is not pale. Findings: No rash. Neurological:      Mental Status: He is alert and oriented to person, place, and time. Psychiatric:         Mood and Affect: Mood normal.         Behavior: Behavior normal.         Thought Content: Thought content normal.         Judgment: Judgment normal.               Data:     Lab Results   Component Value Date     09/01/2020    K 4.2 09/01/2020     09/01/2020    CO2 23 09/01/2020    BUN 17 09/01/2020    CREATININE 1.19 09/01/2020    GLUCOSE 150 09/01/2020    GLUCOSE 110 11/15/2011    PROT 6.7 09/01/2020    LABALBU 4.1 09/01/2020    BILITOT 0.57 09/01/2020    ALKPHOS 92 09/01/2020    AST 18 09/01/2020    ALT 17 09/01/2020     Lab Results   Component Value Date    WBC 6.7 09/01/2020    RBC 4.88 09/01/2020    RBC 4.51 11/15/2011    HGB 13.3 09/01/2020    HCT 40.0 09/01/2020    MCV 82.0 09/01/2020    MCH 27.4 09/01/2020    MCHC 33.4 09/01/2020    RDW 15.7 09/01/2020     09/01/2020     11/15/2011    MPV NOT REPORTED 09/01/2020     Lab Results   Component Value Date    TSH 1.49 09/01/2020     Lab Results   Component Value Date    CHOL 130 09/01/2020    HDL 39 09/01/2020    PSA 0.72 07/25/2017          Assessment & Plan        Diagnosis Orders   1. Essential hypertension   Blood pressure stable, continue on current regimen    2. Depression, unspecified depression type   Will start on Zoloft 25 mg/day, discussed potential side effects from SSRIs including worsening of symptoms. Advised to call if symptoms of side effects sertraline (ZOLOFT) 25 MG tablet   3. Gastroesophageal reflux disease without esophagitis   Doing well. On Omeprazole    4. Hyperlipidemia, unspecified hyperlipidemia type   Controlled, on Lipitor    5. Coronary artery disease involving native heart, angina presence unspecified, unspecified vessel or lesion type  Continue medical management, follows up with Dr. Bisi Baker    6. Atrial fibrillation, unspecified type ([facility] [address]   Rate stable, anticoagulated with Eliquis.      7. Cerebrovascular accident (CVA) due to embolism of right middle cerebral artery ([address]   On Plavix, statins. Still having some generalized weakness. Recommended PT and OT , Tere Leo declined at this time. Completed Refills   Requested Prescriptions     Signed Prescriptions Disp Refills    sertraline (ZOLOFT) 25 MG tablet 30 tablet 3     Sig: Take 1 tablet by mouth daily     Return in about 3 months (around 7/1/2021) for HTN, gerd, hyperlipidemia. Orders Placed This Encounter   Medications    sertraline (ZOLOFT) 25 MG tablet     Sig: Take 1 tablet by mouth daily     Dispense:  30 tablet     Refill:  3     No orders of the defined types were placed in this encounter. Patient Instructions   SURVEY:    You may be receiving a survey from [employer] regarding your visit today. Please complete the survey to enable us to provide the highest quality of care to you and your family. If you cannot score us a very good on any question, please call the office to discuss how we could of made your experience a very good one. Thank you. You may be receiving a survey from [employer] regarding your visit today. You may get this in the mail, through your MyChart or in your email. Please complete the survey to enable us to provide the highest quality of care to you and your family. If you cannot score us as very good ( 5 Stars) on any question, please feel free to call the office to discuss how we could have made your experience exceptional.     Thank You!       MD Antolin Yap RMA Juana Banter, PCA        Electronically signed by Kassidy Marte MD on 4/1/2021 at 9:58 PM           Completed Refills      Requested Prescriptions     Signed Prescriptions Disp Refills    sertraline (ZOLOFT) 25 MG tablet 30 tablet 3     Sig: Take 1 tablet by mouth daily

## 2021-05-20 ENCOUNTER — APPOINTMENT (OUTPATIENT)
Dept: GENERAL RADIOLOGY | Age: 86
End: 2021-05-20
Payer: MEDICARE

## 2021-05-20 ENCOUNTER — HOSPITAL ENCOUNTER (EMERGENCY)
Age: 86
Discharge: HOME | End: 2021-05-20
Attending: INTERNAL MEDICINE
Payer: MEDICARE

## 2021-05-20 VITALS
DIASTOLIC BLOOD PRESSURE: 79 MMHG | TEMPERATURE: 97.8 F | RESPIRATION RATE: 18 BRPM | HEIGHT: 70 IN | SYSTOLIC BLOOD PRESSURE: 164 MMHG | BODY MASS INDEX: 22.9 KG/M2 | OXYGEN SATURATION: 97 % | HEART RATE: 60 BPM | WEIGHT: 160 LBS

## 2021-05-20 DIAGNOSIS — L03.116 CELLULITIS OF LEFT LOWER EXTREMITY: Primary | ICD-10-CM

## 2021-05-20 PROCEDURE — 73590 X-RAY EXAM OF LOWER LEG: CPT

## 2021-05-20 PROCEDURE — 6370000000 HC RX 637 (ALT 250 FOR IP): Performed by: INTERNAL MEDICINE

## 2021-05-20 PROCEDURE — 99283 EMERGENCY DEPT VISIT LOW MDM: CPT

## 2021-05-20 RX ORDER — CEPHALEXIN 250 MG/1
250 CAPSULE ORAL 4 TIMES DAILY
Qty: 40 CAPSULE | Refills: 0 | Status: SHIPPED | OUTPATIENT
Start: 2021-05-20 | End: 2021-05-24 | Stop reason: SINTOL

## 2021-05-20 RX ORDER — SULFAMETHOXAZOLE AND TRIMETHOPRIM 800; 160 MG/1; MG/1
1 TABLET ORAL 2 TIMES DAILY
Qty: 20 TABLET | Refills: 0 | Status: SHIPPED | OUTPATIENT
Start: 2021-05-20 | End: 2021-05-24 | Stop reason: SINTOL

## 2021-05-20 RX ORDER — GINSENG 100 MG
CAPSULE ORAL ONCE
Status: COMPLETED | OUTPATIENT
Start: 2021-05-20 | End: 2021-05-20

## 2021-05-20 RX ADMIN — BACITRACIN: 500 OINTMENT TOPICAL at 15:35

## 2021-05-20 ASSESSMENT — PAIN DESCRIPTION - LOCATION: LOCATION: LEG

## 2021-05-20 ASSESSMENT — PAIN SCALES - GENERAL: PAINLEVEL_OUTOF10: 5

## 2021-05-20 ASSESSMENT — PAIN DESCRIPTION - PAIN TYPE: TYPE: ACUTE PAIN

## 2021-05-20 NOTE — ED PROVIDER NOTES
[facility] ED  EMERGENCY DEPARTMENT ENCOUNTER      Pt Name: Holger Dubois  MRN: [id number]  [place] 1935  Date of evaluation: 5/20/2021  Provider: Ko Stuart MD    [address] [place] [address]       Chief Complaint   Patient presents with    Leg Pain     Pt was using a step ladder to fix some wind damage on his patio roof and the ladder fell so pt fell and hurt his left leg. HISTORY OF PRESENT ILLNESS   (Location/Symptom, Timing/Onset, Context/Setting, Quality, Duration, Modifying Factors, Severity)  Note limiting factors. Holger Dubois is a 82 y.o. male who has a history of coronary disease with CABG x3 vessel, hypertension, gastric ulcers, ureteral stones, BPH, CVA, progressive polyneuropathy, GERD, PTSD, atrial fibrillation, presents to the emergency department for evaluation and management of leg injury. He reports that he was trying to fix something on a patio roof 3 days ago while using a ladder but he had a misstep and scraped his left lower leg. He thought the wound would heal but it has not. He has not tried thing for her symptoms. She has not been seen by any other providers for it. This patient is being evaluated during the COVID-19 pandemic. HPI    Nursing Notes were reviewed. REVIEW OF SYSTEMS    (2-9 systems for level 4, 10 or more for level 5)       REVIEW OF SYSTEMS    Constitutional: Negative for fatigue and fever. Musculoskeletal: Positive for leg pain, negative for arthralgias, back pain and neck pain. Skin: Aside from left leg wound and surrounding erythema, negative for , laceration, pallor, rash       Except as noted above the remainder of the review of systems was reviewed and negative.        PASTMEDICAL HISTORY     Past Medical History:   Diagnosis Date    Anginal pain ([address]     CAD (coronary artery disease)     CVA (cerebral vascular accident) ([address] 05/11/2020    CVA (cerebral vascular accident) ([address] 05/11/2020    Diverticula of colon     Hyperlipidemia  Hypertension     Kidney stone          SURGICAL HISTORY       Past Surgical History:   Procedure Laterality Date    ABDOMEN SURGERY      partial gastrectomy    ANGIOPLASTY  06/29/2016    Dr. Eric Elizabeth @ [place] [id number]  10/02/2018    Dr. Angelina Pride @ [facility]-- Stent x 1    APPENDECTOMY      CARDIAC CATHETERIZATION Left 12/24/14    Dr. Dino Garrison -2 vessel CAD-    CARDIAC CATHETERIZATION  5/4/2007    Dr Angelina Pride: 1. Normal left ventricular systolic function with an estimated ejection fraction of 70%. 2. Elevated left ventricular end-diastolic pressure following angiographic dye load. 3. Double-vessel coronary artery disease with angiographic edivence of restenosis within the intervened-upon segment in the right coronary artery and moderately severe disease just proximal     CARDIAC CATHETERIZATION  5/4/07 con't    to the stented segment in the mid left anterior descending.  CARDIAC CATHETERIZATION  6/12/2008    Dr Blanco: 1. Moderate coronary artery disease, as described, with patent stents with evidence of mild at most in-stent restenosis of the right coronary artery. 2. Normal left ventricular systolic function.  CARDIAC SURGERY      13 stents, aorta and CAD    CHOLECYSTECTOMY      COLECTOMY      COLONOSCOPY      COLONOSCOPY  03-    DILATATION, ESOPHAGUS      EYE SURGERY Bilateral     cataracts    HERNIA REPAIR Right     inguinal    JOINT REPLACEMENT Right 1998 and 2012     knee (3rd surgery)    JOINT REPLACEMENT Right 2012    hip    [place] ESOPHAGOGASTRODUODENOSCOPY TRANSORAL DIAGNOSTIC N/A 6/14/2017    EGD ESOPHAGOGASTRODUODENOSCOPY; photos; bx's performed by Marvin Interiano MD at [address] [place] [address] [facility]  3/9/2011    Dr Harpreet Contreras. Hyperdynamic left ventricular systolic function, estimated ejection fraction of 70%. 2. Normal left ventricular end-diastolic pressure.  3. Double-vessel coronary artery disease with angiographic disease progression involving the stented segment in the mid right coronary artery consistent with angiographic evidence of restenosis.  STOMACH SURGERY      ulcer, gastric bypass due to \"bleeding ulcer\", partial colectomy due to blockage    UPPER GASTROINTESTINAL ENDOSCOPY           CURRENT MEDICATIONS       Discharge Medication List as of 5/20/2021  3:20 PM      CONTINUE these medications which have NOT CHANGED    Details   sertraline (ZOLOFT) 25 MG tablet Take 1 tablet by mouth daily, Disp-30 tablet, R-3Normal      apixaban (ELIQUIS) 5 MG TABS tablet Take by mouth 2 times dailyHistorical Med      lisinopril (PRINIVIL;ZESTRIL) 5 MG tablet Take 5 mg by mouth dailyHistorical Med      metoprolol succinate (TOPROL XL) 25 MG extended release tablet Take 25 mg by mouth dailyHistorical Med      naproxen (NAPROSYN) 375 MG tablet Take 375 mg by mouth 2 times daily as needed for PainHistorical Med      atorvastatin (LIPITOR) 80 MG tablet Take 80 mg by mouth dailyHistorical Med      propranolol (INDERAL) 80 MG tablet Take 1 tablet by mouth 3 times daily, Disp-90 tablet, R-11Normal      docusate sodium (COLACE) 100 MG capsule Take 1 capsule by mouth 3 times daily as needed for Constipation, Disp-45 capsule, R-0Normal      gabapentin (NEURONTIN) 400 MG capsule Take 400 mg by mouth every eveningHistorical Med      omeprazole (PRILOSEC) 20 MG capsule Take 40 mg by mouth Daily Historical Med      vitamin D (CHOLECALCIFEROL) 1000 UNIT TABS tablet Take 3,000 Units by mouth daily Historical Med      clopidogrel (PLAVIX) 75 MG tablet Take 75 mg by mouth daily. ondansetron (ZOFRAN) 4 MG tablet Take 1 tablet by mouth every 6 hours as needed for Nausea, Disp-30 tablet, R-0Normal      nitroGLYCERIN (NITROSTAT) 0.4 MG SL tablet Place 0.4 mg under the tongue every 5 minutes as needed.              ALLERGIES     Pcn [penicillins], Ranolazine, and Penicillamine    FAMILY HISTORY       Family History   Problem Relation Age of Onset    Heart Attack Father     Heart Attack Brother SOCIAL HISTORY       Social History     Socioeconomic History    Marital status:      Spouse name: James Upton Number of children: 3    Years of education: None    Highest education level: None   Occupational History    None   Tobacco Use    Smoking status: Former Smoker     Packs/day: 10.00     Quit date: 3/20/1967     Years since quittin.2    Smokeless tobacco: Never Used   Vaping Use    Vaping Use: Never used   Substance and Sexual Activity    Alcohol use: No    Drug use: No    Sexual activity: None   Other Topics Concern    None   Social History Narrative    None     Social Determinants of Health     Financial Resource Strain: Low Risk     Difficulty of Paying Living Expenses: Not hard at all   Food Insecurity: No Food Insecurity    Worried About Running Out of Food in the Last Year: Never true    Mague of Food in the Last Year: Never true   Transportation Needs: No Transportation Needs    Lack of Transportation (Medical): No    Lack of Transportation (Non-Medical):  No   Physical Activity:     Days of Exercise per Week:     Minutes of Exercise per Session:    Stress:     Feeling of Stress :    Social Connections:     Frequency of Communication with Friends and Family:     Frequency of Social Gatherings with Friends and Family:     Attends Sikhism Services:     Active Member of Clubs or Organizations:     Attends Club or Organization Meetings:     Marital Status:    Intimate Partner Violence:     Fear of Current or Ex-Partner:     Emotionally Abused:     Physically Abused:     Sexually Abused:        SCREENINGS    Kaplan Coma Scale  Eye Opening: Spontaneous  Best Verbal Response: Oriented  Best Motor Response: Obeys commands  Kaplan Coma Scale Score: 15        PHYSICAL EXAM    (up to 7 for level 4, 8 or more for level 5)     ED Triage Vitals   BP Temp Temp src Pulse Resp SpO2 Height Weight   -- -- -- -- -- -- -- --       Physical Exam  Physical Exam Constitutional:  Appears well, well-developed and well-nourished. No distress noted. Non toxic in appearance  HENT:     Head: Normocephalic and atraumatic. No palpable tenderness. Nose: Nose normal. No rhinorrhea or bleeding observed. Mouth/Throat: Oropharynx is clear and mucosa moist. No oropharyngeal exudate noted. Posterior pharynx is pink and noninjected. Eyes: Conjunctivae and EOM are normal. Pupils are equal, round, and reactive to light. No scleral icterus. No tearing or drainage. Neck: Normal range of motion. Neck supple. No tracheal deviation present. Cardiovascular: Normal rate, regular rhythm, normal heart sounds and intact distal pulses. Exam reveals no gallop or friction rub. No murmur heard. Pulmonary/Chest: Effort normal and breath sounds are symmetric and normal. No respiratory distress. There are no wheezes, rales or rhonchi. Abdominal: Soft. Bowel sounds are normal. No distension or no mass exhibitted. No organomegaly. There is no tenderness, rebound, rigidity or guarding. Genitourinary:   No CVA tenderness noted on examination. Musculoskeletal: There is full range of motion of the lower extremities. Including the left leg which shows no limitation of motion, tenderness or deformities. There is mild edema of the left lower leg. Lymphadenopathy:  No cervical adenopathy. Neurological:   Alert and oriented to person, place, and time. Reflexes are normal.  There are no cranial nerve deficits. Normal muscle tone, motor and sensory function exhibitted. Strength 5/5 in all extremities and torso. Coordination and gait normal.   Skin: Exam of the left leg shows that there is an abrasion of the lower leg lateral aspect with diffuse surrounding erythema. The wound measures approximately 2 cm and i has irregular borders. No drainage noted. No proximal streaking. Skin is warm and dry. No rash noted. No diaphoresis. No pallor. Psychiatric:  normal mood and affect.  Behavior is normal. Judgment and thought content normal.     DIAGNOSTIC RESULTS     EKG: All EKG's are interpreted by the Emergency Department Physician who either signs or Co-signs this chart in the absence of a cardiologist.    Not indicated. RADIOLOGY:   Non-plain film images such as CT, Ultrasoundand MRI are read by the radiologist. Plain radiographic images are visualized and preliminarily interpreted by the emergency physician with the below findings:    Not indicated. Interpretation per the Radiologist below, if available at the time of this note:    XR TIBIA FIBULA LEFT (2 VIEWS)   Final Result      No fracture or dislocation left tibia-fibula. ED BEDSIDE ULTRASOUND:   Performed by ED Physician - none    LABS:  Labs Reviewed - No data to display    All other labs were within normal range or not returned as of this dictation. EMERGENCY DEPARTMENT COURSE and DIFFERENTIAL DIAGNOSIS/MDM:   Vitals:    Vitals:    05/20/21 1434   BP: (!) 164/79   Pulse: 60   Resp: 18   Temp: 97.8 °F (36.6 °C)   TempSrc: Oral   SpO2: 97%   Weight: 160 lb (72.6 kg)   Height: 5' 10\" (1.778 m)         Noted    MDM    CRITICAL CARE TIME   Total Critical Care time was 0 minutes      EDCOURSE       CONSULTS:  None    PROCEDURES:  Unless otherwise noted below, none     Procedures      Summation      Kurtis Martini is a 82 y.o. male who has a history of coronary disease with CABG x3 vessel, hypertension, gastric ulcers, ureteral stones, BPH, CVA, progressive polyneuropathy, GERD, PTSD, atrial fibrillation, presented for infected wounds of his left leg with surrounding cellulitis. No evidence of fracture or dislocation on imaging. He was started on Keflex and Bactrim. Keep area clean and dry. Dressing changes twice a day. He is otherwise well, well hydrated, nontoxic, hemodynamically stable, neurologically intact, and satisfactory for discharge for outpatient management. Findings discussed at length with patient. I gave him ample opportunity to ask questions for which they did not have any. I instructed the patient to followup with the PCP for evaluation of response to management of acute infection problem and for management of hypertension. I instructed the patient to return to the ER if his condition worsens, if there is any concern for altered mental status, difficulty breathing, dehydration or loss of function. ED Medicationsadministered this visit:    Medications   bacitracin ointment ( Topical Given 5/20/21 7806)       New Prescriptions from this visit:    Discharge Medication List as of 5/20/2021  3:20 PM      START taking these medications    Details   cephALEXin (KEFLEX) 250 MG capsule Take 1 capsule by mouth 4 times daily for 10 days, Disp-40 capsule, R-0Normal      sulfamethoxazole-trimethoprim (BACTRIM DS) 800-160 MG per tablet Take 1 tablet by mouth 2 times daily for 10 days, Disp-20 tablet, R-0Normal             Follow-up:  [facility] ED  [address] [facility] [address]  [phone]  Go to   As needed, If symptoms worsen    Korey Tsang MD  [address] [place] [address]  [phone]    Schedule an appointment as soon as possible for a visit in 4 days  For wound re-check        Final Impression:   1. Cellulitis of left lower extremity               (Please note that portions of this note werecompleted with a voice recognition program.  Efforts were made to edit the dictations but occasionally words are mis-transcribed.)    FINAL IMPRESSION      1.  Cellulitis of left lower extremity          DISPOSITION/PLAN   DISPOSITION Decision To Discharge 05/20/2021 02:44:18 PM      PATIENT REFERRED TO:  [facility] ED  [address] [facility] [address]  [phone]  Go to   As needed, If symptoms worsen    Korey Tsang MD  [address] [place] [address]  [phone]    Schedule an appointment as soon as possible for a visit in 4 days  For wound re-check      DISCHARGE MEDICATIONS:  Discharge Medication List as of 5/20/2021  3:20 PM      START taking these medications    Details   cephALEXin (KEFLEX) 250 MG capsule Take 1 capsule by mouth 4 times daily for 10 days, Disp-40 capsule, R-0Normal      sulfamethoxazole-trimethoprim (BACTRIM DS) 800-160 MG per tablet Take 1 tablet by mouth 2 times daily for 10 days, Disp-20 tablet, R-0Normal                (Please note that portions of this note were completed with a voice recognition program.  Efforts were made to edit the dictations but occasionally words are mis-transcribed.)    Bang Carias MD (electronically signed)  Attending Emergency Physician          Bang Carias MD  05/21/21 1600 Michael Frank MD  05/21/21 0003

## 2021-05-21 ENCOUNTER — TELEPHONE (OUTPATIENT)
Dept: FAMILY MEDICINE CLINIC | Age: 86
End: 2021-05-21

## 2021-05-21 NOTE — TELEPHONE ENCOUNTER
[facility] ([facility]) ED Follow up Call    Reason for ED visit:  Leg pain, cellulitis     5/21/2021         Hi, this message is for Miriam. This is Nasima Pyle from [email or web address] office. Just calling to see how you are doing after your recent visit to the Emergency Room. [email or web address] wants to make sure you were able to fill any prescriptions and that you understand your discharge instructions. Please return our call if you need to make a follow up appointment with your provider or have any further needs. Our phone number is [phone]. Have a great day.

## 2021-05-24 ENCOUNTER — TELEPHONE (OUTPATIENT)
Dept: FAMILY MEDICINE CLINIC | Age: 86
End: 2021-05-24

## 2021-05-24 RX ORDER — DOXYCYCLINE HYCLATE 100 MG/1
100 CAPSULE ORAL 2 TIMES DAILY
Qty: 14 CAPSULE | Refills: 0 | Status: SHIPPED | OUTPATIENT
Start: 2021-05-24 | End: 2021-05-31

## 2021-05-24 NOTE — TELEPHONE ENCOUNTER
Katherine Avila called stating the Keflex you prescribed or Nuzhat Fetch his stomach up\" so she wonders if you can send a milder antibiotic to [address] [facility] please    Health Maintenance   Topic Date Due    Annual Wellness Visit (AWV)  Never done    DTaP/Tdap/Td vaccine (1 - Tdap) 05/28/2021 (Originally 4/3/1954)    Shingles Vaccine (1 of 2) 05/28/2021 (Originally 4/3/1987)    Lipid screen  09/01/2021    Potassium monitoring  09/01/2021    Creatinine monitoring  09/01/2021    Flu vaccine  Completed    Pneumococcal 65+ years Vaccine  Completed    COVID-19 Vaccine  Completed    Hepatitis A vaccine  Aged Out    Hepatitis B vaccine  Aged Out    Hib vaccine  Aged Out    Meningococcal (ACWY) vaccine  Aged Out             (applicable per patient's age: Cancer Screenings, Depression Screening, Fall Risk Screening, Immunizations)    LDL Cholesterol (mg/dL)   Date Value   09/01/2020 72     AST (U/L)   Date Value   09/01/2020 18     ALT (U/L)   Date Value   09/01/2020 17     BUN (mg/dL)   Date Value   09/01/2020 17      (goal A1C is < 7)   (goal LDL is <100) need 30-50% reduction from baseline     BP Readings from Last 3 Encounters:   05/20/21 (!) 164/79   04/01/21 138/86   09/02/20 (!) 70/60    (goal /80)      All Future Testing planned in [facility]:  Lab Frequency Next Occurrence   TSH with Reflex Once 08/30/2021   CBC Auto Differential Once 08/30/2021   Comprehensive Metabolic Panel Once 08/35/6545   Vitamin D 25 Hydroxy Once 08/30/2021   Lipid Panel Once 08/30/2021   Magnesium Once 08/30/2021   EKG 12 Lead Once 04/01/2021   XR CHEST (2 VW) Once 07/01/2021       Next Visit Date:  Future Appointments   Date Time Provider Luz Marina Byrd   7/1/2021  2:30 PM Liegh Horan MD [facility]   9/16/2021 11:00 AM Josefina Zuniga MD [facility]            Patient Active Problem List:     Hyperlipidemia     CAD (coronary artery disease)     Hypertension     Cervical pain     History of stomach ulcers History of partial gastrectomy     Vitamin D deficiency disease     Ureteral stone with hydronephrosis     BPH with obstruction/lower urinary tract symptoms     Acute constipation     Cerebrovascular accident (CVA) due to embolism of right middle cerebral artery (HCC)     Chronic low back pain     Chronic pansinusitis     Idiopathic progressive polyneuropathy     S/P CABG x 3     Gastroesophageal reflux disease without esophagitis     Abdominal wall hernia     Generalized osteoarthritis     Posttraumatic stress disorder     Pulmonary atelectasis     Shoulder pain     Atrial fibrillation ([address]

## 2021-07-01 ENCOUNTER — OFFICE VISIT (OUTPATIENT)
Dept: FAMILY MEDICINE CLINIC | Age: 86
End: 2021-07-01
Payer: MEDICARE

## 2021-07-01 VITALS
DIASTOLIC BLOOD PRESSURE: 72 MMHG | HEART RATE: 66 BPM | WEIGHT: 170.8 LBS | BODY MASS INDEX: 24.51 KG/M2 | TEMPERATURE: 98.2 F | OXYGEN SATURATION: 96 % | SYSTOLIC BLOOD PRESSURE: 122 MMHG

## 2021-07-01 DIAGNOSIS — K21.9 GASTROESOPHAGEAL REFLUX DISEASE WITHOUT ESOPHAGITIS: ICD-10-CM

## 2021-07-01 DIAGNOSIS — E78.5 HYPERLIPIDEMIA, UNSPECIFIED HYPERLIPIDEMIA TYPE: ICD-10-CM

## 2021-07-01 DIAGNOSIS — I48.91 ATRIAL FIBRILLATION, UNSPECIFIED TYPE (HCC): ICD-10-CM

## 2021-07-01 DIAGNOSIS — I10 ESSENTIAL HYPERTENSION: Primary | ICD-10-CM

## 2021-07-01 DIAGNOSIS — Z91.81 AT HIGH RISK FOR FALLS: ICD-10-CM

## 2021-07-01 PROBLEM — K59.00 ACUTE CONSTIPATION: Status: RESOLVED | Noted: 2018-04-10 | Resolved: 2021-07-01

## 2021-07-01 PROBLEM — K85.10 GALL STONE PANCREATITIS: Status: RESOLVED | Noted: 2020-06-16 | Resolved: 2021-07-01

## 2021-07-01 PROBLEM — J98.11 PULMONARY ATELECTASIS: Status: RESOLVED | Noted: 2021-04-01 | Resolved: 2021-07-01

## 2021-07-01 PROBLEM — J90 PLEURAL EFFUSION ON RIGHT: Status: RESOLVED | Noted: 2019-07-15 | Resolved: 2021-07-01

## 2021-07-01 PROBLEM — K85.10 GALL STONE PANCREATITIS: Status: ACTIVE | Noted: 2020-06-16

## 2021-07-01 PROBLEM — K56.7 ILEUS (HCC): Status: RESOLVED | Noted: 2019-06-28 | Resolved: 2021-07-01

## 2021-07-01 PROBLEM — J90 PLEURAL EFFUSION ON RIGHT: Status: ACTIVE | Noted: 2019-07-15

## 2021-07-01 PROBLEM — F43.10 POSTTRAUMATIC STRESS DISORDER: Status: RESOLVED | Noted: 2021-04-01 | Resolved: 2021-07-01

## 2021-07-01 PROBLEM — K56.7 ILEUS (HCC): Status: ACTIVE | Noted: 2019-06-28

## 2021-07-01 PROCEDURE — 99214 OFFICE O/P EST MOD 30 MIN: CPT | Performed by: INTERNAL MEDICINE

## 2021-07-01 PROCEDURE — 4040F PNEUMOC VAC/ADMIN/RCVD: CPT | Performed by: INTERNAL MEDICINE

## 2021-07-01 PROCEDURE — G8427 DOCREV CUR MEDS BY ELIG CLIN: HCPCS | Performed by: INTERNAL MEDICINE

## 2021-07-01 PROCEDURE — 1123F ACP DISCUSS/DSCN MKR DOCD: CPT | Performed by: INTERNAL MEDICINE

## 2021-07-01 PROCEDURE — 1036F TOBACCO NON-USER: CPT | Performed by: INTERNAL MEDICINE

## 2021-07-01 PROCEDURE — G8420 CALC BMI NORM PARAMETERS: HCPCS | Performed by: INTERNAL MEDICINE

## 2021-07-01 SDOH — ECONOMIC STABILITY: FOOD INSECURITY: WITHIN THE PAST 12 MONTHS, YOU WORRIED THAT YOUR FOOD WOULD RUN OUT BEFORE YOU GOT MONEY TO BUY MORE.: NEVER TRUE

## 2021-07-01 SDOH — ECONOMIC STABILITY: FOOD INSECURITY: WITHIN THE PAST 12 MONTHS, THE FOOD YOU BOUGHT JUST DIDN'T LAST AND YOU DIDN'T HAVE MONEY TO GET MORE.: NEVER TRUE

## 2021-07-01 ASSESSMENT — ENCOUNTER SYMPTOMS
SHORTNESS OF BREATH: 0
ABDOMINAL PAIN: 0
NAUSEA: 0
WHEEZING: 0
CONSTIPATION: 0
SORE THROAT: 0
BLOOD IN STOOL: 0
ALLERGIC/IMMUNOLOGIC NEGATIVE: 1
COUGH: 0

## 2021-07-01 ASSESSMENT — SOCIAL DETERMINANTS OF HEALTH (SDOH): HOW HARD IS IT FOR YOU TO PAY FOR THE VERY BASICS LIKE FOOD, HOUSING, MEDICAL CARE, AND HEATING?: NOT HARD AT ALL

## 2021-07-01 NOTE — PATIENT INSTRUCTIONS
SURVEY:    You may be receiving a survey from [employer] regarding your visit today. Please complete the survey to enable us to provide the highest quality of care to you and your family. If you cannot score us a very good on any question, please call the office to discuss how we could of made your experience a very good one. Thank you. You may be receiving a survey from [employer] regarding your visit today. You may get this in the mail, through your MyChart or in your email. Please complete the survey to enable us to provide the highest quality of care to you and your family. If you cannot score us as very good ( 5 Stars) on any question, please feel free to call the office to discuss how we could have made your experience exceptional.     Thank You!       MD Kelli Campbell

## 2021-07-01 NOTE — PROGRESS NOTES
HPI Notes    Name: Mik Rausch  : 1935         Chief Complaint:     Chief Complaint   Patient presents with    Hypertension     Patient presents today for 3 month check up.  Hyperlipidemia    Gastroesophageal Reflux    Leg Swelling     Patient c/o left lower leg swelling and pain. Patient states he fell off a ladder last month and injured that leg. He had x-ray taken at [facility] ER       History of Present Illness:        Mr Yocasta Edmonds presents to office to follow up for HTN, Hyperlipidemia, GERD,   He also c/o swelling in the left lower leg. He fell of the ladder and scraped lateral aspect of the left lower leg  He went to ER for evaluation. X-ray tibia-fibula revealed no fracture. He was discharged home with Anbx for cellulitis  Zachariah Garcia states the redness improved but the leg has a swollen area that won't heal.     Hypertension  This is a chronic problem. The current episode started more than 1 year ago. The problem is unchanged. The problem is controlled. Pertinent negatives include no anxiety, chest pain, headaches, palpitations, peripheral edema or shortness of breath. There are no associated agents to hypertension. Risk factors for coronary artery disease include male gender and dyslipidemia. Past treatments include ACE inhibitors and beta blockers. The current treatment provides significant improvement. There are no compliance problems. Hypertensive end-organ damage includes CAD/MI and CVA. Hyperlipidemia  This is a chronic problem. The current episode started more than 1 year ago. The problem is controlled. Pertinent negatives include no chest pain, myalgias or shortness of breath. Current antihyperlipidemic treatment includes statins. The current treatment provides significant improvement of lipids. There are no compliance problems. Gastroesophageal Reflux  He reports no abdominal pain, no chest pain, no coughing, no nausea, no sore throat or no wheezing. This is a chronic problem.  The current CHOLECYSTECTOMY      COLECTOMY      COLONOSCOPY      COLONOSCOPY  03-    DILATATION, ESOPHAGUS      EYE SURGERY Bilateral     cataracts    HERNIA REPAIR Right     inguinal    JOINT REPLACEMENT Right 1998 and 2012     knee (3rd surgery)    JOINT REPLACEMENT Right 2012    hip    MD ESOPHAGOGASTRODUODENOSCOPY TRANSORAL DIAGNOSTIC N/A 6/14/2017    EGD ESOPHAGOGASTRODUODENOSCOPY; photos; bx's performed by Bob Castillo MD at [address] [place] [address] [facility]  3/9/2011    Dr Tiarra Tejeda. Hyperdynamic left ventricular systolic function, estimated ejection fraction of 70%. 2. Normal left ventricular end-diastolic pressure. 3. Double-vessel coronary artery disease with angiographic disease progression involving the stented segment in the mid right coronary artery consistent with angiographic evidence of restenosis.  STOMACH SURGERY      ulcer, gastric bypass due to \"bleeding ulcer\", partial colectomy due to blockage    UPPER GASTROINTESTINAL ENDOSCOPY          Medications:       Prior to Admission medications    Medication Sig Start Date End Date Taking?  Authorizing Provider   sertraline (ZOLOFT) 25 MG tablet Take 1 tablet by mouth daily 4/1/21  Yes Martha Mcmullen MD   apixaban (ELIQUIS) 5 MG TABS tablet Take by mouth 2 times daily   Yes Historical Provider, MD   lisinopril (PRINIVIL;ZESTRIL) 5 MG tablet Take 5 mg by mouth daily   Yes Historical Provider, MD   metoprolol succinate (TOPROL XL) 25 MG extended release tablet Take 25 mg by mouth daily   Yes Historical Provider, MD   naproxen (NAPROSYN) 375 MG tablet Take 375 mg by mouth 2 times daily as needed for Pain   Yes Historical Provider, MD   atorvastatin (LIPITOR) 80 MG tablet Take 80 mg by mouth daily   Yes Historical Provider, MD   propranolol (INDERAL) 80 MG tablet Take 1 tablet by mouth 3 times daily 3/3/20  Yes Philipp Linn MD   ondansetron (ZOFRAN) 4 MG tablet Take 1 tablet by mouth every 6 hours as needed for Nausea 4/10/18  Yes REMY Franco - CNP   docusate sodium (COLACE) 100 MG capsule Take 1 capsule by mouth 3 times daily as needed for Constipation 4/10/18  Yes REMY Vann CNP   gabapentin (NEURONTIN) 400 MG capsule Take 400 mg by mouth every evening   Yes Historical Provider, MD   omeprazole (PRILOSEC) 20 MG capsule Take 40 mg by mouth Daily    Yes Historical Provider, MD   vitamin D (CHOLECALCIFEROL) 1000 UNIT TABS tablet Take 3,000 Units by mouth daily    Yes Historical Provider, MD   clopidogrel (PLAVIX) 75 MG tablet Take 75 mg by mouth daily. Yes Historical Provider, MD   nitroGLYCERIN (NITROSTAT) 0.4 MG SL tablet Place 0.4 mg under the tongue every 5 minutes as needed. Yes Historical Provider, MD        Allergies:       Pcn [penicillins], Ranolazine, and Penicillamine    Social History:     Tobacco: reports that he quit smoking about 54 years ago. He smoked 10.00 packs per day. He has never used smokeless tobacco.  Alcohol:      reports no history of alcohol use. Drug Use:  reports no history of drug use. Family History:     Family History   Problem Relation Age of Onset    Heart Attack Father     Heart Attack Brother        Review of Systems:         Review of Systems   Constitutional: Negative for activity change, appetite change and unexpected weight change. HENT: Negative for congestion, ear discharge, ear pain and sore throat. Eyes: Negative for visual disturbance. Respiratory: Negative for cough, shortness of breath and wheezing. Cardiovascular: Positive for leg swelling. Negative for chest pain and palpitations. Gastrointestinal: Negative for abdominal pain, blood in stool, constipation and nausea. Endocrine: Negative for cold intolerance, heat intolerance, polydipsia and polyuria. Genitourinary: Negative for difficulty urinating and dysuria. Musculoskeletal: Negative for myalgias. Skin: Negative for rash. Allergic/Immunologic: Negative. Neurological: Negative for weakness and headaches. Psychiatric/Behavioral: Negative for behavioral problems and dysphoric mood. The patient is not nervous/anxious. Physical Exam:     Vitals:  /72 (Site: Right Upper Arm)   Pulse 66   Temp 98.2 °F (36.8 °C)   Wt 170 lb 12.8 oz (77.5 kg)   SpO2 96%   BMI 24.51 kg/m²       Physical Exam  Vitals reviewed. Constitutional:       General: He is not in acute distress. Appearance: Normal appearance. He is well-developed. HENT:      Head: Normocephalic and atraumatic. Neck:      Thyroid: No thyromegaly. Cardiovascular:      Rate and Rhythm: Normal rate and regular rhythm. Heart sounds: Normal heart sounds. No murmur heard. Pulmonary:      Effort: Pulmonary effort is normal.      Breath sounds: Normal breath sounds. No wheezing or rales. Abdominal:      General: Bowel sounds are normal. There is no distension. Palpations: Abdomen is soft. There is no mass. Tenderness: There is no abdominal tenderness. Musculoskeletal:         General: Tenderness (left lower leg lateral area with a swollen tender area approx. 2x2 inch, likely hematoma) present. Normal range of motion. Right lower leg: No edema. Left lower leg: Edema present. Lymphadenopathy:      Cervical: No cervical adenopathy. Skin:     General: Skin is warm and dry. Coloration: Skin is not jaundiced or pale. Findings: No rash. Neurological:      General: No focal deficit present. Mental Status: He is alert and oriented to person, place, and time.    Psychiatric:         Mood and Affect: Mood normal.         Behavior: Behavior normal.         Judgment: Judgment normal.               Data:     Lab Results   Component Value Date     09/01/2020    K 4.2 09/01/2020     09/01/2020    CO2 23 09/01/2020    BUN 17 09/01/2020    CREATININE 1.19 09/01/2020    GLUCOSE 150 09/01/2020    GLUCOSE 110 11/15/2011    PROT 6.7 09/01/2020    LABALBU 4.1 09/01/2020    BILITOT 0.57 09/01/2020 ALKPHOS 92 09/01/2020    AST 18 09/01/2020    ALT 17 09/01/2020     Lab Results   Component Value Date    WBC 6.7 09/01/2020    RBC 4.88 09/01/2020    RBC 4.51 11/15/2011    HGB 13.3 09/01/2020    HCT 40.0 09/01/2020    MCV 82.0 09/01/2020    MCH 27.4 09/01/2020    MCHC 33.4 09/01/2020    RDW 15.7 09/01/2020     09/01/2020     11/15/2011    MPV NOT REPORTED 09/01/2020     Lab Results   Component Value Date    TSH 1.49 09/01/2020     Lab Results   Component Value Date    CHOL 130 09/01/2020    HDL 39 09/01/2020    PSA 0.72 07/25/2017          Assessment & Plan        Diagnosis Orders   1. Essential hypertension   Well controlled, Continue on current meds      2. Hyperlipidemia, unspecified hyperlipidemia type   Lipid panel controlled, continue on Lipitor    3. Gastroesophageal reflux disease without esophagitis   Symptoms well controlled, continue on Omeprazole    4. Atrial fibrillation, unspecified type ([address]   On eliquis    5. At high risk for falls                     Completed Refills   Requested Prescriptions      No prescriptions requested or ordered in this encounter     Return in about 3 months (around 10/1/2021) for HTN, depression. No orders of the defined types were placed in this encounter. No orders of the defined types were placed in this encounter. Patient Instructions   SURVEY:    You may be receiving a survey from [employer] regarding your visit today. Please complete the survey to enable us to provide the highest quality of care to you and your family. If you cannot score us a very good on any question, please call the office to discuss how we could of made your experience a very good one. Thank you. You may be receiving a survey from [employer] regarding your visit today. You may get this in the mail, through your MyChart or in your email. Please complete the survey to enable us to provide the highest quality of care to you and your family.     If you cannot score us as very good ( 5 Stars) on any question, please feel free to call the office to discuss how we could have made your experience exceptional.     Thank You! MD Iman Campbell        Electronically signed by Quirino Phillips MD on 7/1/2021 at 9:31 PM           Completed Refills      Requested Prescriptions      No prescriptions requested or ordered in this encounter           On the basis of positive falls risk screening, assessment and plan is as follows: home safety tips provided, patient declines any further evaluation/treatment for increased falls risk.

## 2021-08-30 ENCOUNTER — HOSPITAL ENCOUNTER (OUTPATIENT)
Age: 86
Discharge: HOME | End: 2021-09-01
Payer: MEDICARE

## 2021-08-30 ENCOUNTER — HOSPITAL ENCOUNTER (OUTPATIENT)
Dept: GENERAL RADIOLOGY | Age: 86
Discharge: HOME | End: 2021-09-01
Payer: MEDICARE

## 2021-08-30 ENCOUNTER — HOSPITAL ENCOUNTER (OUTPATIENT)
Age: 86
Discharge: HOME | End: 2021-08-30
Payer: MEDICARE

## 2021-08-30 DIAGNOSIS — E55.9 VITAMIN D DEFICIENCY DISEASE: ICD-10-CM

## 2021-08-30 DIAGNOSIS — I48.91 ATRIAL FIBRILLATION, UNSPECIFIED TYPE (HCC): ICD-10-CM

## 2021-08-30 DIAGNOSIS — I25.10 CORONARY ARTERY DISEASE INVOLVING NATIVE HEART: ICD-10-CM

## 2021-08-30 DIAGNOSIS — I63.9 CEREBROVASCULAR ACCIDENT (CVA), UNSPECIFIED MECHANISM (HCC): ICD-10-CM

## 2021-08-30 DIAGNOSIS — I10 HYPERTENSION, UNSPECIFIED TYPE: ICD-10-CM

## 2021-08-30 LAB
ABSOLUTE EOS #: 0.1 K/UL (ref 0–0.4)
ABSOLUTE IMMATURE GRANULOCYTE: ABNORMAL K/UL (ref 0–0.3)
ABSOLUTE LYMPH #: 1.2 K/UL (ref 1–4.8)
ABSOLUTE MONO #: 0.7 K/UL (ref 0–1)
ALBUMIN SERPL-MCNC: 3.8 G/DL (ref 3.5–5.2)
ALBUMIN/GLOBULIN RATIO: ABNORMAL (ref 1–2.5)
ALP BLD-CCNC: 105 U/L (ref 40–129)
ALT SERPL-CCNC: 85 U/L (ref 5–41)
ANION GAP SERPL CALCULATED.3IONS-SCNC: 10 MMOL/L (ref 9–17)
AST SERPL-CCNC: 34 U/L
BASOPHILS # BLD: 0 % (ref 0–2)
BASOPHILS ABSOLUTE: 0 K/UL (ref 0–0.2)
BILIRUB SERPL-MCNC: 0.36 MG/DL (ref 0.3–1.2)
BUN BLDV-MCNC: 16 MG/DL (ref 8–23)
BUN/CREAT BLD: 14 (ref 9–20)
CALCIUM SERPL-MCNC: 8.8 MG/DL (ref 8.6–10.4)
CHLORIDE BLD-SCNC: 103 MMOL/L (ref 98–107)
CHOLESTEROL/HDL RATIO: 4.2
CHOLESTEROL: 167 MG/DL
CO2: 24 MMOL/L (ref 20–31)
CREAT SERPL-MCNC: 1.12 MG/DL (ref 0.7–1.2)
DIFFERENTIAL TYPE: YES
EOSINOPHILS RELATIVE PERCENT: 2 % (ref 0–5)
GFR AFRICAN AMERICAN: >60 ML/MIN
GFR NON-AFRICAN AMERICAN: >60 ML/MIN
GFR SERPL CREATININE-BSD FRML MDRD: ABNORMAL ML/MIN/{1.73_M2}
GFR SERPL CREATININE-BSD FRML MDRD: ABNORMAL ML/MIN/{1.73_M2}
GLUCOSE BLD-MCNC: 161 MG/DL (ref 70–99)
HCT VFR BLD CALC: 40 % (ref 41–53)
HDLC SERPL-MCNC: 40 MG/DL
HEMOGLOBIN: 13.6 G/DL (ref 13.5–17.5)
IMMATURE GRANULOCYTES: ABNORMAL %
LDL CHOLESTEROL: 88 MG/DL (ref 0–130)
LYMPHOCYTES # BLD: 21 % (ref 13–44)
MAGNESIUM: 2.1 MG/DL (ref 1.6–2.6)
MCH RBC QN AUTO: 28.1 PG (ref 26–34)
MCHC RBC AUTO-ENTMCNC: 34 G/DL (ref 31–37)
MCV RBC AUTO: 82.7 FL (ref 80–100)
MONOCYTES # BLD: 12 % (ref 5–9)
NRBC AUTOMATED: ABNORMAL PER 100 WBC
PATIENT FASTING?: YES
PDW BLD-RTO: 15.1 % (ref 12.1–15.2)
PLATELET # BLD: 259 K/UL (ref 140–450)
PLATELET ESTIMATE: ABNORMAL
PMV BLD AUTO: ABNORMAL FL (ref 6–12)
POTASSIUM SERPL-SCNC: 4.5 MMOL/L (ref 3.7–5.3)
RBC # BLD: 4.84 M/UL (ref 4.5–5.9)
RBC # BLD: ABNORMAL 10*6/UL
SEG NEUTROPHILS: 65 % (ref 39–75)
SEGMENTED NEUTROPHILS ABSOLUTE COUNT: 3.8 K/UL (ref 2.1–6.5)
SODIUM BLD-SCNC: 137 MMOL/L (ref 135–144)
TOTAL PROTEIN: 6.5 G/DL (ref 6.4–8.3)
TRIGL SERPL-MCNC: 193 MG/DL
TSH SERPL DL<=0.05 MIU/L-ACNC: 2.39 MIU/L (ref 0.3–5)
VITAMIN D 25-HYDROXY: 42.1 NG/ML (ref 30–100)
VLDLC SERPL CALC-MCNC: ABNORMAL MG/DL (ref 1–30)
WBC # BLD: 5.9 K/UL (ref 3.5–11)
WBC # BLD: ABNORMAL 10*3/UL

## 2021-08-30 PROCEDURE — 80061 LIPID PANEL: CPT

## 2021-08-30 PROCEDURE — 93005 ELECTROCARDIOGRAM TRACING: CPT

## 2021-08-30 PROCEDURE — 36415 COLL VENOUS BLD VENIPUNCTURE: CPT

## 2021-08-30 PROCEDURE — 85025 COMPLETE CBC W/AUTO DIFF WBC: CPT

## 2021-08-30 PROCEDURE — 84443 ASSAY THYROID STIM HORMONE: CPT

## 2021-08-30 PROCEDURE — 83735 ASSAY OF MAGNESIUM: CPT

## 2021-08-30 PROCEDURE — 80053 COMPREHEN METABOLIC PANEL: CPT

## 2021-08-30 PROCEDURE — 71046 X-RAY EXAM CHEST 2 VIEWS: CPT

## 2021-08-30 PROCEDURE — 82306 VITAMIN D 25 HYDROXY: CPT

## 2021-09-05 LAB
EKG ATRIAL RATE: 61 BPM
EKG P AXIS: 18 DEGREES
EKG P-R INTERVAL: 208 MS
EKG Q-T INTERVAL: 440 MS
EKG QRS DURATION: 148 MS
EKG QTC CALCULATION (BAZETT): 442 MS
EKG R AXIS: -43 DEGREES
EKG T AXIS: 2 DEGREES
EKG VENTRICULAR RATE: 61 BPM

## 2021-09-16 ENCOUNTER — HOSPITAL ENCOUNTER (OUTPATIENT)
Age: 86
Discharge: HOME | End: 2021-09-16
Payer: MEDICARE

## 2021-09-16 ENCOUNTER — OFFICE VISIT (OUTPATIENT)
Dept: CARDIOLOGY CLINIC | Age: 86
End: 2021-09-16
Payer: MEDICARE

## 2021-09-16 VITALS
HEART RATE: 70 BPM | BODY MASS INDEX: 24.68 KG/M2 | OXYGEN SATURATION: 97 % | DIASTOLIC BLOOD PRESSURE: 70 MMHG | SYSTOLIC BLOOD PRESSURE: 120 MMHG | WEIGHT: 172 LBS

## 2021-09-16 DIAGNOSIS — I48.91 ATRIAL FIBRILLATION, UNSPECIFIED TYPE (HCC): ICD-10-CM

## 2021-09-16 DIAGNOSIS — R73.09 ELEVATED GLUCOSE: ICD-10-CM

## 2021-09-16 DIAGNOSIS — I63.9 CEREBROVASCULAR ACCIDENT (CVA), UNSPECIFIED MECHANISM (HCC): Primary | ICD-10-CM

## 2021-09-16 DIAGNOSIS — I10 HYPERTENSION, UNSPECIFIED TYPE: ICD-10-CM

## 2021-09-16 DIAGNOSIS — E55.9 VITAMIN D DEFICIENCY DISEASE: ICD-10-CM

## 2021-09-16 PROCEDURE — 4040F PNEUMOC VAC/ADMIN/RCVD: CPT | Performed by: INTERNAL MEDICINE

## 2021-09-16 PROCEDURE — 1036F TOBACCO NON-USER: CPT | Performed by: INTERNAL MEDICINE

## 2021-09-16 PROCEDURE — 36415 COLL VENOUS BLD VENIPUNCTURE: CPT

## 2021-09-16 PROCEDURE — G8420 CALC BMI NORM PARAMETERS: HCPCS | Performed by: INTERNAL MEDICINE

## 2021-09-16 PROCEDURE — 1123F ACP DISCUSS/DSCN MKR DOCD: CPT | Performed by: INTERNAL MEDICINE

## 2021-09-16 PROCEDURE — G8427 DOCREV CUR MEDS BY ELIG CLIN: HCPCS | Performed by: INTERNAL MEDICINE

## 2021-09-16 PROCEDURE — 83036 HEMOGLOBIN GLYCOSYLATED A1C: CPT

## 2021-09-16 PROCEDURE — 99214 OFFICE O/P EST MOD 30 MIN: CPT | Performed by: INTERNAL MEDICINE

## 2021-09-16 NOTE — LETTER
Natalie Echevarria M.D. [address] [place] [address]  [phone]          2021          Jacobo Trinidad MD  [address] [facility], [address] [facility]      RE:   Vida Rogel  :  1935      Dear Dr. Jones Ragland:    CHIEF COMPLAINT:  1. Atypical chest pain. 2.  Coronary artery disease. HISTORY OF PRESENT ILLNESS:  I had the pleasure of seeing Mr. Jesika Ward in our office on 2021. He is a pleasant 28-year-old gentleman with a very complex history. He had a catheterization on 2021, where he had 50% LAD, 80% RCA, unremarkable circumflex, with an EF of 50%, with angioplasty of the posterior ventricular branch of the right coronary artery, placing a Tetra 3.0 x 13 mm bare-metal stent and angioplasty of the PDA with balloon alone. He then had a Bx 3.5 x 18 mm stent placed in the mid right coronary artery and a 3.5 x 18 mm Velocity stent placed in the proximal right coronary artery. He had multiple episodes of restenosis with stenting of his LAD in , with restenosis and brachytherapy. On 2007, a catheterization by Dr. Marla Mccarthy showed severe disease in the right coronary artery, in which a 3.5 x 24 mm Taxus stent was placed with also angioplasty of the LAD placing a 2.75 x 12 mm Taxus stent. On 2011, he had 80% in the right coronary artery, with brachytherapy and placement of a 4.0 x 28 mm Promus stent. On 2016, he had 50% in-stent stenosis of the right coronary artery, 50% LAD, 80% mid LAD, with LAD stented with a 3.5 x 38 mm Resolute, and on 10/02/2018, he had a 4.0 x 22 mm Synergy stent in the LAD. On 2019, a catheterization showed severe critical left main trunk disease, with 75% LAD and ostial circumflex, right coronary artery had 80% disease, EF of 65%.     This resulted in open heart surgery by Dr. Rosetta Medley on 2019, with a LIMA to the LAD, a vein graft to the PDA, and a vein graft to the OM branch to the circumflex. He had postoperative atrial fibrillation with RVR, treated with amiodarone. On 05/11/2020, he had weakness in his left arm and lower extremity and a CTA showed occlusion of the distal intradural segment of the right ventricular lobe with a right middle cerebral artery territory stroke with ischemic infarct. It was felt his embolic was occult atrial fibrillation and he was placed on Eliquis 5 mg b.i.d. along with Plavix and aspirin. He was asked to wear an event recorder, which he did not wish to do because of sensitivity to patches. On 06/03/2020, he was walking with a cane, although he was gradually increasing strength. There was a recommendation to remain on Plavix and Eliquis. We did talk about implantable loop recorder to monitor for atrial fibrillation, however, he developed pancreatitis with subsequent three endoscopies and CBD dilatation and stone removal.    I last saw him on 09/02/2020, and at that time, he was gradually improving and we decided not to do a loop recorder. We decided to continue Eliquis and Plavix even though there was not a clear indication with atrial fibrillation. He has done well over this past year. He has some atypical sharp discomfort on his right side of his chest.  This lasts for seconds. He has some shortness of breath with exertion but he feels that overall he is doing well. He has some left ankle edema after he fell when he was on the ladder. He stopped Imdur several months ago because of dizziness. He had cancer spots removed from his left ear and right side of his face by Dr. Yonis Hernandez. He stays busy at home as Wesley has osteoarthritis and she also needs a toe removed. He denies any exertional chest pain or chest discomfort. No unusual shortness of breath. Energy level has been good. He did have, what appears to be a, keloid on his sternal wound.   He has tried multiple creams although has not really helped and it remains quite sensitive. He did have cellulitis in May of his left lower extremity, which has healed. CARDIAC RISK FACTORS:  Known CAD:  Positive. PTCA:  Positive. Open Heart Surgery:  Positive. Hypertension:  Positive. Hyperlipidemia:  Positive. Other Family Members:  Positive. Peripheral Vascular Disease:  Negative. Diabetes:  Negative. Smoking:  Negative. MEDICATIONS AT HOME:  He is currently on Eliquis 5 mg b.i.d., Lipitor 80 mg daily, Plavix 75 mg daily, Neurontin 400 mg daily, lisinopril 5 mg daily, Toprol-XL 25 mg daily, Prilosec 40 mg daily, Zofran 4 mg every 6 hours p.r.n., Inderal 80 mg t.i.d., Zoloft 25 mg daily, vitamin D 3000 units daily. PAST MEDICAL AND SURGICAL HISTORY:  1. Cardiac as above. 2.  Right knee surgery on 2013. 4.  Hypertension. 5.  Hyperlipidemia. 6.  Right knee replacement. 7.  Ureteral stones. 8.  Bilateral inguinal hernia repair. 9.  Large ventral hernia, painful, not repaired. 10.  Cervical disk disease. 11.  CVA on 2020, with mild residual left-sided weakness. 12.  Status post common bile duct dilatation secondary to pancreatitis. FAMILY HISTORY:  Father  of MI at 76. Brother  of MI at 54. Sister  of MI at 67. SOCIAL HISTORY:  He is 82years old, . Three sons, one is a  in [place], another has traumatic stress syndrome along with parkinsonism, and one son is an advisor to a company. He does not smoke or drink alcohol. His wife needs surgery on her foot again to remove a toe. He walks with a cane. He has somebody mowing his lawn although he is not doing a good job, therefore, he is going to take over the mowing duties this coming year. REVIEW OF SYSTEMS:  Cardiac as above.   Other systems reviewed including constitutional, eyes, ears, nose and throat, cardiovascular, respiratory, GI, , musculoskeletal, integumentary, neurologic, endocrine, hematologic and Embolic CVA in multiple sites in the brain on 05/11/2020, with no significant carotid disease, with a recommendation for Eliquis 5 mg b.i.d. along with Plavix 75 mg daily at [facility] even though atrial fibrillation had not been documented. 3.  Unable to wear a 30-day event recorder because of sensitivity to patches. 4.  Scheduled to have an implantable loop recorder, which we canceled because of acute pancreatitis. 5.  Hospitalization from 06/16/2020 to 06/23/2020, with three ERCPs and dilatation of his CBD with resolution of his pancreatitis. 6.  Catheterization by Dr. Munira Groves on 06/17/2019, showed 50% left main trunk, 75% LAD, 80% RCA, 75% OM, with an EF of 65%. 7.  Open heart on 06/24/2019, by Dr. Olinda Moss, with a LIMA to the LAD, vein graft to the OM, and vein graft to the right coronary artery. 8.  First catheterization done on 05/20/2001 at [facility] [place], with stenting of the proximal and mid right coronary artery with bare-metal stent. 9.  Stenting of the LAD in [place] in 2004. 10.  Brachytherapy of the LAD in 2004. 11.  Restenosis of the right coronary artery on 05/04/2007, placing a 3.5 x 24 mm Taxus stent in the right coronary artery and a 2.75 x 12 mm Taxus stent in the LAD. 12.  A 4.0 x 28 mm Promus stent in the right coronary artery on 03/09/2011. 13.  Catheterization on 06/29/2016, with 80% in-stent stenosis of the mid LAD, 50% RCA, unremarkable circumflex, EF of 70%, with stenting of the mid LAD with a 3.5 x 38 mm Resolute stent. 14.  Hyperlipidemia. 15.  Hypertension, well controlled. 16.  Basal cell carcinoma removed by Dr. Dilan Stewart. 17.  Elevated glucose at 161. PLAN:  1. Decrease Plavix to every other day. 2.  Continue Eliquis 5 mg b.i.d.  3.  We will plan on seeing in 6 months. 4.  We will do hemoglobin A1c today. DISCUSSION:  Mr. Mei Wright overall is doing well. He has had no further CVAs. His risk factors are nicely modified.   I did note that his fasting glucose was elevated at 161 and we will do an A1c on his way out today and call him with the results. He does see Dr. Fransisca Kim in one month. Thank you very much for allowing me the privilege of seeing . Chon Esposito. If you have any questions on my thoughts, please do not hesitate to contact me.      Sincerely,        Roxanna Coy    D: 09/16/2021 12:01:14     T: 09/17/2021 4:00:29     [email or web address]  Job#: [id number]   Doc#: [id number]

## 2021-09-17 LAB
ESTIMATED AVERAGE GLUCOSE: 117 MG/DL
HBA1C MFR BLD: 5.7 % (ref 4–6)

## 2021-09-17 NOTE — PROGRESS NOTES
Jeana Cartwright M.D. [address] [place] [address] [facility] [phone]          2021          Phill Del Rosario MD  [address] [facility], [address] [facility]      RE:   Brandan Riley  :  1935      Dear Dr. Michel Morales:    CHIEF COMPLAINT:  1. Atypical chest pain. 2.  Coronary artery disease. HISTORY OF PRESENT ILLNESS:  I had the pleasure of seeing Mr. Sukhwinder Malhotra in our office on 2021. He is a pleasant 28-year-old gentleman with a very complex history. He had a catheterization on 2021, where he had 50% LAD, 80% RCA, unremarkable circumflex, with an EF of 50%, with angioplasty of the posterior ventricular branch of the right coronary artery, placing a Tetra 3.0 x 13 mm bare-metal stent and angioplasty of the PDA with balloon alone. He then had a Bx 3.5 x 18 mm stent placed in the mid right coronary artery and a 3.5 x 18 mm Velocity stent placed in the proximal right coronary artery. He had multiple episodes of restenosis with stenting of his LAD in , with restenosis and brachytherapy. On 2007, a catheterization by Dr. Da Law showed severe disease in the right coronary artery, in which a 3.5 x 24 mm Taxus stent was placed with also angioplasty of the LAD placing a 2.75 x 12 mm Taxus stent. On 2011, he had 80% in the right coronary artery, with brachytherapy and placement of a 4.0 x 28 mm Promus stent. On 2016, he had 50% in-stent stenosis of the right coronary artery, 50% LAD, 80% mid LAD, with LAD stented with a 3.5 x 38 mm Resolute, and on 10/02/2018, he had a 4.0 x 22 mm Synergy stent in the LAD. On 2019, a catheterization showed severe critical left main trunk disease, with 75% LAD and ostial circumflex, right coronary artery had 80% disease, EF of 65%.     This resulted in open heart surgery by Dr. Calos Sanchez on 2019, with a LIMA to the LAD, a vein graft to the PDA, and a really helped and it remains quite sensitive. He did have cellulitis in May of his left lower extremity, which has healed. CARDIAC RISK FACTORS:  Known CAD:  Positive. PTCA:  Positive. Open Heart Surgery:  Positive. Hypertension:  Positive. Hyperlipidemia:  Positive. Other Family Members:  Positive. Peripheral Vascular Disease:  Negative. Diabetes:  Negative. Smoking:  Negative. MEDICATIONS AT HOME:  He is currently on Eliquis 5 mg b.i.d., Lipitor 80 mg daily, Plavix 75 mg daily, Neurontin 400 mg daily, lisinopril 5 mg daily, Toprol-XL 25 mg daily, Prilosec 40 mg daily, Zofran 4 mg every 6 hours p.r.n., Inderal 80 mg t.i.d., Zoloft 25 mg daily, vitamin D 3000 units daily. PAST MEDICAL AND SURGICAL HISTORY:  1. Cardiac as above. 2.  Right knee surgery on 2013. 4.  Hypertension. 5.  Hyperlipidemia. 6.  Right knee replacement. 7.  Ureteral stones. 8.  Bilateral inguinal hernia repair. 9.  Large ventral hernia, painful, not repaired. 10.  Cervical disk disease. 11.  CVA on 2020, with mild residual left-sided weakness. 12.  Status post common bile duct dilatation secondary to pancreatitis. FAMILY HISTORY:  Father  of MI at 76. Brother  of MI at 54. Sister  of MI at 67. SOCIAL HISTORY:  He is 82years old, . Three sons, one is a  in [address] [place] [address], another has traumatic stress syndrome along with parkinsonism, and one son is an advisor to a company. He does not smoke or drink alcohol. His wife needs surgery on her foot again to remove a toe. He walks with a cane. He has somebody mowing his lawn although he is not doing a good job, therefore, he is going to take over the mowing duties this coming year. REVIEW OF SYSTEMS:  Cardiac as above.   Other systems reviewed including constitutional, eyes, ears, nose and throat, cardiovascular, respiratory, GI, , musculoskeletal, integumentary, neurologic, endocrine, hematologic and allergic/immunologic are negative except for what is described above. No weight loss or weight gain. No change in bowel habits, no blood in stools. No fevers, sweats or chills. PHYSICAL EXAMINATION:  VITAL SIGNS:  His blood pressure was 120/70 in both arms with a heart rate of 70 and regular. Respiratory rate 18. O2 saturation 97%. Weight 172 pounds. GENERAL:  He is a pleasant 82-year-old gentleman. Denied pain. He was oriented to person, place and time. Answered questions appropriately. SKIN:  He does have keloid on his sternum from his open heart surgery. This is very sensitive and somewhat painful. HEENT:  The pupils are equally round and intact. Mucous membranes were dry. NECK:  No JVD. Good carotid pulses. No carotid bruits. No lymphadenopathy or thyromegaly. CARDIOVASCULAR EXAM:  S1 and S2 were normal.  No S3 or S4. Soft systolic blowing type murmur. No diastolic murmur. PMI was normal.  No lift, thrust, or pericardial friction rub. LUNGS:  Quite clear to auscultation and percussion. ABDOMEN:  Soft and nontender. Good bowel sounds. EXTREMITIES:  Good femoral pulses. Good pedal pulses. No pedal edema. Skin was warm and dry. No calf tenderness. Nail beds pink. Good cap refill. PULSES:  Bilateral symmetrical radial, brachial and carotid pulses. No carotid bruits. Good femoral and pedal pulses. NEUROLOGIC EXAM:  Within normal limits. PSYCHIATRIC EXAM:  Within normal limits. LABORATORY DATA:  Sodium 137, potassium 4.5, BUN 16, creatinine 1.1, GFR was 60. Magnesium 2.1. His glucose was very elevated at 161. Cholesterol 167, HDL 40, LDL 88, triglycerides 193. ALT was 85, AST was 34. His TSH was 2.39. Vitamin D 42.1. White count was 5.9 with hemoglobin of 13.6 with a platelet count of 958,225. EKG showed normal sinus rhythm and had a right bundle-branch block, which is chronic. Chest x-ray was unremarkable. IMPRESSION:  1. Coronary artery disease.   2. 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 along with Plavix 75 mg daily at [facility] even though atrial fibrillation had not been documented. 3.  Unable to wear a 30-day event recorder because of sensitivity to patches. 4.  Scheduled to have an implantable loop recorder, which we canceled because of acute pancreatitis. 5.  Hospitalization from 06/16/2020 to 06/23/2020, with three ERCPs and dilatation of his CBD with resolution of his pancreatitis. 6.  Catheterization by Dr. Jorden Ugalde on 06/17/2019, showed 50% left main trunk, 75% LAD, 80% RCA, 75% OM, with an EF of 65%. 7.  Open heart on 06/24/2019, by Dr. Jennifer Chaudhry, with a LIMA to the LAD, vein graft to the OM, and vein graft to the right coronary artery. 8.  First catheterization done on 05/20/2001 at [facility], with stenting of the proximal and mid right coronary artery with bare-metal stent. 9.  Stenting of the LAD in [facility] in 2004. 10.  Brachytherapy of the LAD in 2004. 11.  Restenosis of the right coronary artery on 05/04/2007, placing a 3.5 x 24 mm Taxus stent in the right coronary artery and a 2.75 x 12 mm Taxus stent in the LAD. 12.  A 4.0 x 28 mm Promus stent in the right coronary artery on 03/09/2011. 13.  Catheterization on 06/29/2016, with 80% in-stent stenosis of the mid LAD, 50% RCA, unremarkable circumflex, EF of 70%, with stenting of the mid LAD with a 3.5 x 38 mm Resolute stent. 14.  Hyperlipidemia. 15.  Hypertension, well controlled. 16.  Basal cell carcinoma removed by Dr. Alexandria Ceron. 17.  Elevated glucose at 161. PLAN:  1. Decrease Plavix to every other day. 2.  Continue Eliquis 5 mg b.i.d.  3.  We will plan on seeing in 6 months. 4.  We will do hemoglobin A1c today. DISCUSSION:  Mr. Yakov Nur overall is doing well. He has had no further CVAs. His risk factors are nicely modified.   I did note that his fasting glucose was elevated at 161 and we will do an A1c on his way out today and call him with the results. He does see Dr. Denny Jones in one month. Thank you very much for allowing me the privilege of seeing Mr. Ryan Isaac. If you have any questions on my thoughts, please do not hesitate to contact me.      Sincerely,        Kenney Rowe    D: 09/16/2021 12:01:14     T: 09/17/2021 4:00:29     [email or web address]  Job#: [id number]   Doc#: [id number]

## 2021-09-23 ENCOUNTER — APPOINTMENT (OUTPATIENT)
Dept: GENERAL RADIOLOGY | Age: 86
End: 2021-09-23
Payer: MEDICARE

## 2021-09-23 ENCOUNTER — HOSPITAL ENCOUNTER (EMERGENCY)
Age: 86
Discharge: HOME | End: 2021-09-23
Attending: FAMILY MEDICINE
Payer: MEDICARE

## 2021-09-23 VITALS
SYSTOLIC BLOOD PRESSURE: 163 MMHG | DIASTOLIC BLOOD PRESSURE: 76 MMHG | HEART RATE: 70 BPM | TEMPERATURE: 97.6 F | RESPIRATION RATE: 14 BRPM | BODY MASS INDEX: 24.34 KG/M2 | WEIGHT: 170 LBS | OXYGEN SATURATION: 98 % | HEIGHT: 70 IN

## 2021-09-23 DIAGNOSIS — I20.8 STABLE ANGINA PECTORIS (HCC): Primary | ICD-10-CM

## 2021-09-23 LAB
ABSOLUTE EOS #: 0.1 K/UL (ref 0–0.4)
ABSOLUTE IMMATURE GRANULOCYTE: ABNORMAL K/UL (ref 0–0.3)
ABSOLUTE LYMPH #: 1.6 K/UL (ref 1–4.8)
ABSOLUTE MONO #: 0.7 K/UL (ref 0–1)
ALBUMIN SERPL-MCNC: 3.9 G/DL (ref 3.5–5.2)
ALBUMIN/GLOBULIN RATIO: ABNORMAL (ref 1–2.5)
ALP BLD-CCNC: 92 U/L (ref 40–129)
ALT SERPL-CCNC: 18 U/L (ref 5–41)
ANION GAP SERPL CALCULATED.3IONS-SCNC: 12 MMOL/L (ref 9–17)
AST SERPL-CCNC: 17 U/L
BASOPHILS # BLD: 0 % (ref 0–2)
BASOPHILS ABSOLUTE: 0 K/UL (ref 0–0.2)
BILIRUB SERPL-MCNC: 0.54 MG/DL (ref 0.3–1.2)
BILIRUBIN URINE: NEGATIVE
BUN BLDV-MCNC: 13 MG/DL (ref 8–23)
BUN/CREAT BLD: 12 (ref 9–20)
CALCIUM SERPL-MCNC: 9.2 MG/DL (ref 8.6–10.4)
CHLORIDE BLD-SCNC: 99 MMOL/L (ref 98–107)
CO2: 24 MMOL/L (ref 20–31)
COLOR: YELLOW
COMMENT UA: ABNORMAL
CREAT SERPL-MCNC: 1.13 MG/DL (ref 0.7–1.2)
D-DIMER QUANTITATIVE: 0.28 MG/L FEU (ref 0–0.59)
DIFFERENTIAL TYPE: YES
EOSINOPHILS RELATIVE PERCENT: 2 % (ref 0–5)
GFR AFRICAN AMERICAN: >60 ML/MIN
GFR NON-AFRICAN AMERICAN: >60 ML/MIN
GFR SERPL CREATININE-BSD FRML MDRD: ABNORMAL ML/MIN/{1.73_M2}
GFR SERPL CREATININE-BSD FRML MDRD: ABNORMAL ML/MIN/{1.73_M2}
GLUCOSE BLD-MCNC: 207 MG/DL (ref 70–99)
GLUCOSE URINE: ABNORMAL
HCT VFR BLD CALC: 43.8 % (ref 41–53)
HEMOGLOBIN: 14.9 G/DL (ref 13.5–17.5)
IMMATURE GRANULOCYTES: ABNORMAL %
KETONES, URINE: NEGATIVE
LEUKOCYTE ESTERASE, URINE: NEGATIVE
LYMPHOCYTES # BLD: 28 % (ref 13–44)
MCH RBC QN AUTO: 27.9 PG (ref 26–34)
MCHC RBC AUTO-ENTMCNC: 34 G/DL (ref 31–37)
MCV RBC AUTO: 82 FL (ref 80–100)
MONOCYTES # BLD: 13 % (ref 5–9)
NITRITE, URINE: NEGATIVE
NRBC AUTOMATED: ABNORMAL PER 100 WBC
PDW BLD-RTO: 15 % (ref 12.1–15.2)
PH UA: 6 (ref 5–8)
PLATELET # BLD: 256 K/UL (ref 140–450)
PLATELET ESTIMATE: ABNORMAL
PMV BLD AUTO: ABNORMAL FL (ref 6–12)
POTASSIUM SERPL-SCNC: 4.1 MMOL/L (ref 3.7–5.3)
PROTEIN UA: NEGATIVE
RBC # BLD: 5.34 M/UL (ref 4.5–5.9)
RBC # BLD: ABNORMAL 10*6/UL
SEG NEUTROPHILS: 57 % (ref 39–75)
SEGMENTED NEUTROPHILS ABSOLUTE COUNT: 3.2 K/UL (ref 2.1–6.5)
SODIUM BLD-SCNC: 135 MMOL/L (ref 135–144)
SPECIFIC GRAVITY UA: 1.01 (ref 1–1.03)
TOTAL PROTEIN: 6.8 G/DL (ref 6.4–8.3)
TROPONIN INTERP: ABNORMAL
TROPONIN INTERP: ABNORMAL
TROPONIN INTERP: NORMAL
TROPONIN T: ABNORMAL NG/ML
TROPONIN T: ABNORMAL NG/ML
TROPONIN T: NORMAL NG/ML
TROPONIN, HIGH SENSITIVITY: 22 NG/L (ref 0–22)
TROPONIN, HIGH SENSITIVITY: 26 NG/L (ref 0–22)
TROPONIN, HIGH SENSITIVITY: 32 NG/L (ref 0–22)
TURBIDITY: CLEAR
URINE HGB: NEGATIVE
UROBILINOGEN, URINE: NORMAL
WBC # BLD: 5.7 K/UL (ref 3.5–11)
WBC # BLD: ABNORMAL 10*3/UL

## 2021-09-23 PROCEDURE — 84484 ASSAY OF TROPONIN QUANT: CPT

## 2021-09-23 PROCEDURE — 80053 COMPREHEN METABOLIC PANEL: CPT

## 2021-09-23 PROCEDURE — 71045 X-RAY EXAM CHEST 1 VIEW: CPT

## 2021-09-23 PROCEDURE — 81003 URINALYSIS AUTO W/O SCOPE: CPT

## 2021-09-23 PROCEDURE — 93005 ELECTROCARDIOGRAM TRACING: CPT | Performed by: FAMILY MEDICINE

## 2021-09-23 PROCEDURE — 85025 COMPLETE CBC W/AUTO DIFF WBC: CPT

## 2021-09-23 PROCEDURE — 85379 FIBRIN DEGRADATION QUANT: CPT

## 2021-09-23 PROCEDURE — 36415 COLL VENOUS BLD VENIPUNCTURE: CPT

## 2021-09-23 PROCEDURE — 99284 EMERGENCY DEPT VISIT MOD MDM: CPT

## 2021-09-23 RX ORDER — ASPIRIN 81 MG/1
162 TABLET, CHEWABLE ORAL ONCE
Status: DISCONTINUED | OUTPATIENT
Start: 2021-09-23 | End: 2021-09-23 | Stop reason: HOSPADM

## 2021-09-23 RX ORDER — ONDANSETRON 2 MG/ML
4 INJECTION INTRAMUSCULAR; INTRAVENOUS EVERY 30 MIN PRN
Status: DISCONTINUED | OUTPATIENT
Start: 2021-09-23 | End: 2021-09-23 | Stop reason: HOSPADM

## 2021-09-23 RX ORDER — ISOSORBIDE MONONITRATE 30 MG/1
15 TABLET, EXTENDED RELEASE ORAL DAILY
Qty: 30 TABLET | Refills: 0 | Status: SHIPPED | OUTPATIENT
Start: 2021-09-23 | End: 2021-09-27 | Stop reason: DRUGHIGH

## 2021-09-23 ASSESSMENT — PAIN DESCRIPTION - DESCRIPTORS: DESCRIPTORS: SQUEEZING

## 2021-09-23 ASSESSMENT — PAIN DESCRIPTION - LOCATION: LOCATION: CHEST

## 2021-09-23 ASSESSMENT — PAIN SCALES - GENERAL: PAINLEVEL_OUTOF10: 0

## 2021-09-23 ASSESSMENT — PAIN DESCRIPTION - PAIN TYPE: TYPE: ACUTE PAIN

## 2021-09-23 NOTE — ED PROVIDER NOTES
eMERGENCY dEPARTMENT eNCOUnter        [facility]    Chief Complaint   Patient presents with    Chest Pain     Pt brought in by [facility] for Chest pain that started around 930 this morning. HPI    Jacquie Rangel is a 82 y.o. male who presents by squad because of sudden onset of chest pain at 9:30 AM this morning. The patient was not doing any activity. Occurred at rest.  911 is called. In route he received 3 nitroglycerin which relieved the pain. EKG is undertaken showing significant bundle branch blocks which are old. Stable vital signs in route. Not hypoxic. No recent fever. Does have history of CAD patient did spend day at the hospital yesterday for his wife's toe amputation. Recently saw my cardiologist who \"gave me a clean bill of health\". REVIEW OF SYSTEMS    All body systems reviewed. Pertinent positive and negative findings are mentioned in the HPI. PAST MEDICAL HISTORY    Past Medical History:   Diagnosis Date    Anginal pain ([address]     CAD (coronary artery disease)     CVA (cerebral vascular accident) ([address] 05/11/2020    CVA (cerebral vascular accident) ([address] 05/11/2020    Diverticula of colon     Hyperlipidemia     Hypertension     Kidney stone        SURGICAL HISTORY    Past Surgical History:   Procedure Laterality Date    ABDOMEN SURGERY      partial gastrectomy    ANGIOPLASTY  06/29/2016    Dr. Mariah Singer @ [place] [address]  10/02/2018    Dr. Jeneen Cooks @ [facility]-- Stent x 1    APPENDECTOMY      CARDIAC CATHETERIZATION Left 12/24/14    Dr. Sonu Thacker -2 vessel CAD-    CARDIAC CATHETERIZATION  5/4/2007    Dr Jeneen Cooks: 1. Normal left ventricular systolic function with an estimated ejection fraction of 70%. 2. Elevated left ventricular end-diastolic pressure following angiographic dye load.  3. Double-vessel coronary artery disease with angiographic edivence of restenosis within the intervened-upon segment in the right coronary artery and moderately severe 90 tablet 11    docusate sodium (COLACE) 100 MG capsule Take 1 capsule by mouth 3 times daily as needed for Constipation 45 capsule 0    gabapentin (NEURONTIN) 400 MG capsule Take 400 mg by mouth every evening      omeprazole (PRILOSEC) 20 MG capsule Take 40 mg by mouth Daily       vitamin D (CHOLECALCIFEROL) 1000 UNIT TABS tablet Take 3,000 Units by mouth daily       clopidogrel (PLAVIX) 75 MG tablet Take 75 mg by mouth every other day       nitroGLYCERIN (NITROSTAT) 0.4 MG SL tablet Place 0.4 mg under the tongue every 5 minutes as needed.       naproxen (NAPROSYN) 375 MG tablet Take 375 mg by mouth 2 times daily as needed for Pain      ondansetron (ZOFRAN) 4 MG tablet Take 1 tablet by mouth every 6 hours as needed for Nausea 30 tablet 0       ALLERGIES    Allergies   Allergen Reactions    Pcn [Penicillins]      Large red spots    Ranolazine Other (See Comments)     dizziness  Other reaction(s): Unknown    Penicillamine Other (See Comments) and Rash       FAMILY HISTORY    Family History   Problem Relation Age of Onset    Heart Attack Father     Heart Attack Brother        SOCIAL HISTORY    Social History     Socioeconomic History    Marital status:      Spouse name: Kwaku Moe    Number of children: 3    Years of education: None    Highest education level: None   Occupational History    None   Tobacco Use    Smoking status: Former Smoker     Packs/day: 10.00     Quit date: 3/20/1967     Years since quittin.5    Smokeless tobacco: Never Used   Vaping Use    Vaping Use: Never used   Substance and Sexual Activity    Alcohol use: No    Drug use: No    Sexual activity: None   Other Topics Concern    None   Social History Narrative    None     Social Determinants of Health     Financial Resource Strain: Low Risk     Difficulty of Paying Living Expenses: Not hard at all   Food Insecurity: No Food Insecurity    Worried About Running Out of Food in the Last Year: Never true   [employer] tachycardia with RBBB and LAFB. Consider LVH. Consider T wave inversion in anterior leads. RADIOLOGY/PROCEDURES    This x-ray is considered nonacute    ED COURSE & MEDICAL DECISION MAKING    Pertinent Labs & Imaging studies reviewed. (See chart for details)    Summation      Patient Course: 82-year-old male with sudden onset of chest pain this a.m. History of CAD. He received 3 nitro in route which relieved the pain. Stable in the emergency room. Initial EKG shows T wave inversion and significant bundle branch block patterns which are on prior EKG. Initial troponin negative. Second troponin pending. Follow-up EKG. ED Medications administered this visit:    Medications   ondansetron (ZOFRAN) injection 4 mg (has no administration in time range)   aspirin chewable tablet 162 mg (162 mg Oral Not Given 9/23/21 1026)       New Prescriptions from this visit:    New Prescriptions    ISOSORBIDE MONONITRATE (IMDUR) 30 MG EXTENDED RELEASE TABLET    Take 0.5 tablets by mouth daily       Follow-up: Discussed patient with Dr. Azam Swift to assume care. Final Impression: Chest pain in adult         (Please note that portions of this note were completed with a voice recognition program.  Efforts were made to edit the dictations but occasionally words are mis-transcribed.)          Noemiyanet ChongDO jerry  09/23/21 1232    Addendum:    Patient was signed out to me by Dr. Isha Lala pending his troponins. Patient's troponin initially was 22 and then 26 on repeat. His EKG was unremarkable. I did speak with his cardiologist who stated that he felt he could go home on Imdur and follow-up on Monday in the office. Patient has been chest pain-free since being here in the ED. I did run a third high-sensitivity troponin which was 32. I discussed these results with the patient and told him given the increase in his high-sensitivity troponins I felt that it would be best if we kept him in the hospital for observation.   Patient

## 2021-09-24 LAB
EKG ATRIAL RATE: 102 BPM
EKG P AXIS: 65 DEGREES
EKG P-R INTERVAL: 192 MS
EKG Q-T INTERVAL: 382 MS
EKG QRS DURATION: 128 MS
EKG QTC CALCULATION (BAZETT): 497 MS
EKG R AXIS: -63 DEGREES
EKG T AXIS: 62 DEGREES
EKG VENTRICULAR RATE: 102 BPM

## 2021-09-24 PROCEDURE — 93010 ELECTROCARDIOGRAM REPORT: CPT | Performed by: INTERNAL MEDICINE

## 2021-09-24 RX ORDER — NITROGLYCERIN 0.4 MG/1
0.4 TABLET SUBLINGUAL EVERY 5 MIN PRN
Qty: 25 TABLET | Refills: 2 | Status: SHIPPED | OUTPATIENT
Start: 2021-09-24

## 2021-09-27 ENCOUNTER — OFFICE VISIT (OUTPATIENT)
Dept: CARDIOLOGY CLINIC | Age: 86
End: 2021-09-27
Payer: MEDICARE

## 2021-09-27 VITALS
SYSTOLIC BLOOD PRESSURE: 140 MMHG | BODY MASS INDEX: 24.25 KG/M2 | OXYGEN SATURATION: 93 % | DIASTOLIC BLOOD PRESSURE: 80 MMHG | WEIGHT: 169 LBS | HEART RATE: 108 BPM

## 2021-09-27 DIAGNOSIS — I20.8 STABLE ANGINA PECTORIS (HCC): Primary | ICD-10-CM

## 2021-09-27 PROCEDURE — G8420 CALC BMI NORM PARAMETERS: HCPCS | Performed by: INTERNAL MEDICINE

## 2021-09-27 PROCEDURE — 99214 OFFICE O/P EST MOD 30 MIN: CPT | Performed by: INTERNAL MEDICINE

## 2021-09-27 PROCEDURE — 1036F TOBACCO NON-USER: CPT | Performed by: INTERNAL MEDICINE

## 2021-09-27 PROCEDURE — 1123F ACP DISCUSS/DSCN MKR DOCD: CPT | Performed by: INTERNAL MEDICINE

## 2021-09-27 PROCEDURE — 4040F PNEUMOC VAC/ADMIN/RCVD: CPT | Performed by: INTERNAL MEDICINE

## 2021-09-27 PROCEDURE — G8428 CUR MEDS NOT DOCUMENT: HCPCS | Performed by: INTERNAL MEDICINE

## 2021-09-27 RX ORDER — PROPRANOLOL HYDROCHLORIDE 160 MG/1
160 CAPSULE, EXTENDED RELEASE ORAL DAILY
Qty: 60 CAPSULE | Refills: 11 | Status: SHIPPED | OUTPATIENT
Start: 2021-09-27 | End: 2022-05-26 | Stop reason: ALTCHOICE

## 2021-09-27 RX ORDER — AMLODIPINE BESYLATE 5 MG/1
5 TABLET ORAL DAILY
Qty: 30 TABLET | Refills: 11 | Status: SHIPPED | OUTPATIENT
Start: 2021-09-27 | End: 2022-01-04 | Stop reason: SDUPTHER

## 2021-09-27 RX ORDER — ISOSORBIDE MONONITRATE 30 MG/1
30 TABLET, EXTENDED RELEASE ORAL 2 TIMES DAILY
Qty: 60 TABLET | Refills: 11 | Status: SHIPPED | OUTPATIENT
Start: 2021-09-27 | End: 2022-01-11

## 2021-09-27 RX ORDER — ISOSORBIDE MONONITRATE 30 MG/1
30 TABLET, EXTENDED RELEASE ORAL 2 TIMES DAILY
COMMUNITY
End: 2021-09-27 | Stop reason: SDUPTHER

## 2021-09-27 RX ORDER — AMLODIPINE BESYLATE 5 MG/1
5 TABLET ORAL DAILY
COMMUNITY
End: 2021-09-27 | Stop reason: SDUPTHER

## 2021-09-27 NOTE — PROGRESS NOTES
Ov Dr. Osker Boeck for er follow up for   Chest pain   Was given Imdur in ER   \"seems to be working\"   No chest pain today   Will have chest discomfort 'not pain\"  Comes and goes - goes into   Back and arms   Will get tingling in fingers  Has had 2 episodes this past month   Feels like same sx as when he   Had a stroke   Will have chest squeezing with   Any activity   Bedside echo done      Will INCREASE Imdur 30 mg to one full tablet twice a day     Will INCREASE Propranolol (inderal) [id number]   One tablet twice a day   (may take 2 of the 80 mg tablets twice a day until gone then new rx will be for the 160 mg tablet to   Take ONE twice a day)    Will ADD Norvasc 5 mg one tablet daily     Monitor bp/hr and bring records with you to your   Next appt    Follow up in 2 weeks

## 2021-09-27 NOTE — LETTER
Queen Harper M.D. [address] [place] [address]  [phone]    2021    Amy Mccarthy MD  6060 [facility], [address] [facility]      RE:   Mik Rausch  :  1935      Dear Dr. Malena Hester:    CHIEF COMPLAINT:  New onset of chest pain with exertion consistent with previous angina. HISTORY OF PRESENT ILLNESS:  I had the pleasure of seeing Mr. Yocasta Edmonds in the office on 2021. I trust you received my dictation from 2021. At that time, he was doing well with no chest pain or chest discomfort. He had no syncope, near syncope, lightheaded, or dizziness and overall is feeling well. Approximately 1 week ago he began developing chest pain and chest discomfort. He would have it when he attempted to do any activity. He went to the emergency room on , with his chest pain. It was more severe at that time and he took 3 nitroglycerin along with 5 baby aspirin and it subsided entirely. His workup was negative in the emergency room. I am seeing him today in followup. He was placed on Imdur 30 mg half a tablet daily in the emergency room. He states that his pressure is better, but is still present if he does any activity. When he walked in from his car, he did develop some chest pressure consistent with his angina. He has had no syncope, near syncope, lightheaded, or dizziness. His medications today are Eliquis 5 mg b.i.d., Lipitor 80 mg daily, Plavix 75 mg daily, Neurontin 400 mg daily, Imdur 30 mg half a tablet daily, lisinopril 5 mg daily, Prilosec 20 mg daily, Zofran p.r.n., Inderal 80 mg t.i.d., Zoloft 25 mg, vitamin D 3000 units daily. PHYSICAL EXAMINATION:  VITAL SIGNS:  His blood pressure was 140/80 with a heart rate of 108 and regular. Respiratory rate 18. O2 saturation 92%. Weight 169 pounds. GENERAL:  He is a pleasant 82-year-old gentleman. He denied pain.   He was oriented to person, place and time. Answered questions appropriately. SKIN:  No unusual skin changes. HEENT:  The pupils are equally round and intact. Mucous membranes were dry. NECK:  No JVD. Good carotid pulses. No carotid bruits. No lymphadenopathy or thyromegaly. CARDIOVASCULAR EXAM:  S1 and S2 were normal.  No S3 or S4. Soft systolic blowing type murmur. No diastolic murmur. PMI was normal.  No lift, thrust, or pericardial friction rub. LUNGS:  Quite clear to auscultation and percussion. ABDOMEN:  Soft and nontender. Good bowel sounds. EXTREMITIES:  Good femoral pulses. Good pedal pulses. No pedal edema. IMPRESSION:  1.  New onset of chest heaviness with activity consistent with angina starting approximately a week ago. 2.  Hypertension with mild tachycardia. 3.  Status post multiple angioplasties. 4.  Catheterization on 06/17/2019, showing 50% left main trunk, 75% LAD, 80% RCA, 75% OM with an EF of 65%. 5.  Open heart surgery on 06/24/2019, by Dr. Roxanna Quintana with a LIMA to the LAD, a vein graft to the OM, and a vein graft to the right coronary artery. PLAN:  1. Increase Imdur to 30 mg b.i.d.  2.  Increase Inderal to 160 mg b.i.d. from 80 mg t.i.d.  3.  Start amlodipine 5 mg daily. 5.  We will see in 2 weeks with him taking his blood pressure and heart rate daily to get follow up on his chest pain. DISCUSSION:  Mr. Evangelista Medina has developed anginal-type symptoms typical of what he had previously. Again, he had bypass surgery with grafts to the LAD, OM, and right coronary artery. I suspect that he has had progression of his disease. We will place him on full medical therapy and bring him back in 2 weeks for reassessment. If he continues to have discomfort on full medical therapy, then we need to consider repeating a cardiac catheterization. Thank you very much for allowing me the privilege of seeing Mr. Evangelista Medina.   If you have any questions on my thoughts, please do not hesitate to contact me.    Sincerely,        Patricia Ross    D: 09/27/2021 13:46:38     T: 09/27/2021 13:49:42     [email or web address]  Job#: [id number]   Doc#: [id number]

## 2021-09-27 NOTE — PATIENT INSTRUCTIONS
Will INCREASE Imdur 30 mg to one full tablet twice a day     Will INCREASE Propranolol (inderal) [id number]   One tablet twice a day   (may take 2 of the 80 mg tablets twice a day until gone then new rx will be for the 160 mg tablet to   Take ONE twice a day)    Will ADD Norvasc 5 mg one tablet daily     Monitor bp/hr and bring records with you to your   Next appt    Follow up in 2 weeks

## 2021-09-28 NOTE — PROGRESS NOTES
Atif Bo M.D. [address] [place] [address], [place] [address]  [phone]    2021    Loida Javed MD  [address] [facility], [address] [place] [address]      RE:   [facility]  :  1935      Dear Dr. Jacqueline Ceballos:    CHIEF COMPLAINT:  New onset of chest pain with exertion consistent with previous angina. HISTORY OF PRESENT ILLNESS:  I had the pleasure of seeing Mr. Olga Billingsley in the office on 2021. I trust you received my dictation from 2021. At that time, he was doing well with no chest pain or chest discomfort. He had no syncope, near syncope, lightheaded, or dizziness and overall is feeling well. Approximately 1 week ago he began developing chest pain and chest discomfort. He would have it when he attempted to do any activity. He went to the emergency room on , with his chest pain. It was more severe at that time and he took 3 nitroglycerin along with 5 baby aspirin and it subsided entirely. His workup was negative in the emergency room. I am seeing him today in followup. He was placed on Imdur 30 mg half a tablet daily in the emergency room. He states that his pressure is better, but is still present if he does any activity. When he walked in from his car, he did develop some chest pressure consistent with his angina. He has had no syncope, near syncope, lightheaded, or dizziness. His medications today are Eliquis 5 mg b.i.d., Lipitor 80 mg daily, Plavix 75 mg daily, Neurontin 400 mg daily, Imdur 30 mg half a tablet daily, lisinopril 5 mg daily, Prilosec 20 mg daily, Zofran p.r.n., Inderal 80 mg t.i.d., Zoloft 25 mg, vitamin D 3000 units daily. PHYSICAL EXAMINATION:  VITAL SIGNS:  His blood pressure was 140/80 with a heart rate of 108 and regular. Respiratory rate 18. O2 saturation 92%. Weight 169 pounds. GENERAL:  He is a pleasant 27-year-old gentleman. He denied pain.   He was oriented to person, place and time. Answered questions appropriately. SKIN:  No unusual skin changes. HEENT:  The pupils are equally round and intact. Mucous membranes were dry. NECK:  No JVD. Good carotid pulses. No carotid bruits. No lymphadenopathy or thyromegaly. CARDIOVASCULAR EXAM:  S1 and S2 were normal.  No S3 or S4. Soft systolic blowing type murmur. No diastolic murmur. PMI was normal.  No lift, thrust, or pericardial friction rub. LUNGS:  Quite clear to auscultation and percussion. ABDOMEN:  Soft and nontender. Good bowel sounds. EXTREMITIES:  Good femoral pulses. Good pedal pulses. No pedal edema. IMPRESSION:  1.  New onset of chest heaviness with activity consistent with angina starting approximately a week ago. 2.  Hypertension with mild tachycardia. 3.  Status post multiple angioplasties. 4.  Catheterization on 06/17/2019, showing 50% left main trunk, 75% LAD, 80% RCA, 75% OM with an EF of 65%. 5.  Open heart surgery on 06/24/2019, by Dr. Jesus Daniel with a LIMA to the LAD, a vein graft to the OM, and a vein graft to the right coronary artery. PLAN:  1. Increase Imdur to 30 mg b.i.d.  2.  Increase Inderal to 160 mg b.i.d. from 80 mg t.i.d.  3.  Start amlodipine 5 mg daily. 5.  We will see in 2 weeks with him taking his blood pressure and heart rate daily to get follow up on his chest pain. DISCUSSION:  Mr. Reggie Irene has developed anginal-type symptoms typical of what he had previously. Again, he had bypass surgery with grafts to the LAD, OM, and right coronary artery. I suspect that he has had progression of his disease. We will place him on full medical therapy and bring him back in 2 weeks for reassessment. If he continues to have discomfort on full medical therapy, then we need to consider repeating a cardiac catheterization. Thank you very much for allowing me the privilege of seeing Mr. Reggie Irene.   If you have any questions on my thoughts, please do not hesitate to contact me.    Sincerely,        Luis Eduardo Hope    D: 09/27/2021 13:46:38     T: 09/27/2021 13:49:42     [email or web address]  Job#: [id number]   Doc#: [id number]

## 2021-10-05 ENCOUNTER — OFFICE VISIT (OUTPATIENT)
Dept: FAMILY MEDICINE CLINIC | Age: 86
End: 2021-10-05
Payer: MEDICARE

## 2021-10-05 VITALS
HEART RATE: 65 BPM | DIASTOLIC BLOOD PRESSURE: 64 MMHG | OXYGEN SATURATION: 96 % | RESPIRATION RATE: 18 BRPM | WEIGHT: 170 LBS | HEIGHT: 70 IN | BODY MASS INDEX: 24.34 KG/M2 | SYSTOLIC BLOOD PRESSURE: 102 MMHG

## 2021-10-05 DIAGNOSIS — E78.5 HYPERLIPIDEMIA, UNSPECIFIED HYPERLIPIDEMIA TYPE: ICD-10-CM

## 2021-10-05 DIAGNOSIS — K21.9 GASTROESOPHAGEAL REFLUX DISEASE WITHOUT ESOPHAGITIS: ICD-10-CM

## 2021-10-05 DIAGNOSIS — I25.10 CORONARY ARTERY DISEASE INVOLVING NATIVE HEART, UNSPECIFIED VESSEL OR LESION TYPE, UNSPECIFIED WHETHER ANGINA PRESENT: ICD-10-CM

## 2021-10-05 DIAGNOSIS — I10 PRIMARY HYPERTENSION: Primary | ICD-10-CM

## 2021-10-05 PROCEDURE — 4040F PNEUMOC VAC/ADMIN/RCVD: CPT | Performed by: INTERNAL MEDICINE

## 2021-10-05 PROCEDURE — 99214 OFFICE O/P EST MOD 30 MIN: CPT | Performed by: INTERNAL MEDICINE

## 2021-10-05 PROCEDURE — 1123F ACP DISCUSS/DSCN MKR DOCD: CPT | Performed by: INTERNAL MEDICINE

## 2021-10-05 PROCEDURE — G8427 DOCREV CUR MEDS BY ELIG CLIN: HCPCS | Performed by: INTERNAL MEDICINE

## 2021-10-05 PROCEDURE — G8420 CALC BMI NORM PARAMETERS: HCPCS | Performed by: INTERNAL MEDICINE

## 2021-10-05 PROCEDURE — 1036F TOBACCO NON-USER: CPT | Performed by: INTERNAL MEDICINE

## 2021-10-05 PROCEDURE — G8484 FLU IMMUNIZE NO ADMIN: HCPCS | Performed by: INTERNAL MEDICINE

## 2021-10-05 ASSESSMENT — ENCOUNTER SYMPTOMS
NAUSEA: 0
CONSTIPATION: 0
BLOOD IN STOOL: 0
SHORTNESS OF BREATH: 0
COUGH: 0
ALLERGIC/IMMUNOLOGIC NEGATIVE: 1
WHEEZING: 0
ABDOMINAL PAIN: 0
SORE THROAT: 0

## 2021-10-05 NOTE — PROGRESS NOTES
HPI Notes    Name: Leigha Collins  : 1935         Chief Complaint:     Chief Complaint   Patient presents with    3 Month Follow-Up     No complaints       History of Present Illness:        Leigha Collins presents to office to follow-up for hypertension, GERD, hyperlipidemia    He has a history of coronary artery disease. Went to [facility], ER on  complaining of chest pain with activities. Had elevated troponin level and advised admission for observation. He refused and decided to go home. He was prescribed Imdur 30 mg half a tablet daily. He followed up with Dr. Natalie Mansfield on . Dr. Natalie Mansfield felt that his chest pain was possibly related to progression of underlying CAD. Patient was started on amlodipine. Inderal dose was changed from 80 mg twice daily to 160 mg bid, Imdur was changed to 30 mg bid. He is supposed to follow-up with Dr. Natalie Mansfield. States doing well today although developed HA from Imdur. Reports  no CP, no SOB   Concerned about low  BP that  went down with SBP around 100-106. Feels weaker and  sometimes feels dizzy. Mir Zarate Hypertension  This is a chronic problem. The current episode started yesterday. The problem is controlled. Pertinent negatives include no chest pain, headaches, palpitations, peripheral edema or shortness of breath. There are no associated agents to hypertension. Past treatments include ACE inhibitors, calcium channel blockers, beta blockers and direct vasodilators. There are no compliance problems. Hypertensive end-organ damage includes CAD/MI. Gastroesophageal Reflux  He reports no abdominal pain, no chest pain, no coughing, no nausea, no sore throat or no wheezing. This is a chronic problem. The current episode started more than 1 year ago. The problem occurs constantly. The problem has been unchanged. The symptoms are aggravated by certain foods. Pertinent negatives include no fatigue. He has tried a PPI for the symptoms.  The treatment provided significant relief. Hyperlipidemia  This is a chronic problem. The current episode started more than 1 year ago. The problem is controlled. Recent lipid tests were reviewed and are variable. Pertinent negatives include no chest pain or shortness of breath. Current antihyperlipidemic treatment includes statins. The current treatment provides significant improvement of lipids. There are no compliance problems. Past Medical History:     Past Medical History:   Diagnosis Date    [facility]     CAD (coronary artery disease)     CVA (cerebral vascular accident) ([facility] 05/11/2020    CVA (cerebral vascular accident) ([facility] 05/11/2020    Diverticula of colon     Hyperlipidemia     Hypertension     Kidney stone       Reviewed all health maintenance requirements and orderedappropriate tests  Health Maintenance Due   Topic Date Due    Annual Wellness Visit (AWV)  Never done    Flu vaccine (1) 09/01/2021       Past Surgical History:     Past Surgical History:   Procedure Laterality Date    ABDOMEN SURGERY      partial gastrectomy    ANGIOPLASTY  06/29/2016    Dr. Tera Olivas @ [place] [id number]  10/02/2018    Dr. Natalie Junior @ [facility]-- Stent x 1    APPENDECTOMY      CARDIAC CATHETERIZATION Left 12/24/14    Dr. Palomo Marcelo -2 vessel CAD-    CARDIAC CATHETERIZATION  5/4/2007    Dr Natalie Junior: 1. Normal left ventricular systolic function with an estimated ejection fraction of 70%. 2. Elevated left ventricular end-diastolic pressure following angiographic dye load. 3. Double-vessel coronary artery disease with angiographic edivence of restenosis within the intervened-upon segment in the right coronary artery and moderately severe disease just proximal     CARDIAC CATHETERIZATION  5/4/07 con't    to the stented segment in the mid left anterior descending.  CARDIAC CATHETERIZATION  6/12/2008    Dr Blanco: 1.  Moderate coronary artery disease, as described, with patent stents with evidence of mild at most in-stent restenosis of the right coronary artery. 2. Normal left ventricular systolic function.  CARDIAC SURGERY      13 stents, aorta and CAD    CHOLECYSTECTOMY      COLECTOMY      COLONOSCOPY      COLONOSCOPY  03-    DILATATION, ESOPHAGUS      EYE SURGERY Bilateral     cataracts    HERNIA REPAIR Right     inguinal    JOINT REPLACEMENT Right 1998 and 2012     knee (3rd surgery)    JOINT REPLACEMENT Right 2012    hip    AL ESOPHAGOGASTRODUODENOSCOPY TRANSORAL DIAGNOSTIC N/A 6/14/2017    EGD ESOPHAGOGASTRODUODENOSCOPY; photos; bx's performed by Roddy Guevara MD at [address] [place] [address] [facility]  3/9/2011    Dr Lupillo Amanda. Hyperdynamic left ventricular systolic function, estimated ejection fraction of 70%. 2. Normal left ventricular end-diastolic pressure. 3. Double-vessel coronary artery disease with angiographic disease progression involving the stented segment in the mid right coronary artery consistent with angiographic evidence of restenosis.  STOMACH SURGERY      ulcer, gastric bypass due to \"bleeding ulcer\", partial colectomy due to blockage    UPPER GASTROINTESTINAL ENDOSCOPY          Medications:       Prior to Admission medications    Medication Sig Start Date End Date Taking?  Authorizing Provider   propranolol (INDERAL LA) 160 MG extended release capsule Take 1 capsule by mouth daily 9/27/21  Yes Jhonatan Rizvi MD   amLODIPine (NORVASC) 5 MG tablet Take 1 tablet by mouth daily 9/27/21  Yes Jhonatan Rizvi MD   isosorbide mononitrate (IMDUR) 30 MG extended release tablet Take 1 tablet by mouth 2 times daily 9/27/21  Yes Jhonatan Rizvi MD   nitroGLYCERIN (NITROSTAT) 0.4 MG SL tablet Place 1 tablet under the tongue every 5 minutes as needed (chest pain) 9/24/21  Yes Jhonatan Rizvi MD   sertraline (ZOLOFT) 25 MG tablet Take 1 tablet by mouth daily 4/1/21  Yes Molly Anderson MD   apixaban (ELIQUIS) 5 MG TABS tablet Take by mouth 2 times daily   Yes Historical Provider, MD   lisinopril (PRINIVIL;ZESTRIL) 5 MG tablet Take 5 mg by mouth daily   Yes Historical Provider, MD   naproxen (NAPROSYN) 375 MG tablet Take 375 mg by mouth 2 times daily as needed for Pain   Yes Historical Provider, MD   atorvastatin (LIPITOR) 80 MG tablet Take 80 mg by mouth daily   Yes Historical Provider, MD   propranolol (INDERAL) 80 MG tablet Take 1 tablet by mouth 3 times daily 3/3/20  Yes Edmond Ford MD   ondansetron (ZOFRAN) 4 MG tablet Take 1 tablet by mouth every 6 hours as needed for Nausea 4/10/18  Yes REMY Roche CNP   docusate sodium (COLACE) 100 MG capsule Take 1 capsule by mouth 3 times daily as needed for Constipation 4/10/18  Yes REMY Roche CNP   omeprazole (PRILOSEC) 20 MG capsule Take 40 mg by mouth Daily    Yes Historical Provider, MD   vitamin D (CHOLECALCIFEROL) 1000 UNIT TABS tablet Take 3,000 Units by mouth daily    Yes Historical Provider, MD   clopidogrel (PLAVIX) 75 MG tablet Take 75 mg by mouth every other day    Yes Historical Provider, MD        Allergies:       Pcn [penicillins], Ranolazine, and Penicillamine    Social History:     Tobacco: reports that he quit smoking about 54 years ago. He smoked 10.00 packs per day. He has never used smokeless tobacco.  Alcohol:      reports no history of alcohol use. Drug Use:  reports no history of drug use. Family History:     Family History   Problem Relation Age of Onset    Heart Attack Father     Heart Attack Brother        Review of Systems:         Review of Systems   Constitutional: Negative for activity change, appetite change, fatigue and unexpected weight change. HENT: Negative for congestion, ear discharge, ear pain and sore throat. Eyes: Negative for visual disturbance. Respiratory: Negative for cough, shortness of breath and wheezing. Cardiovascular: Negative for chest pain, palpitations and leg swelling. Gastrointestinal: Negative for abdominal pain, blood in stool, constipation and nausea. Endocrine: Negative for cold intolerance, heat intolerance, polydipsia and polyuria. Genitourinary: Negative for difficulty urinating and dysuria. Musculoskeletal: Negative. Skin: Negative for rash. Allergic/Immunologic: Negative. Neurological: Positive for dizziness. Negative for weakness and headaches. Psychiatric/Behavioral: Negative for behavioral problems and dysphoric mood. The patient is not nervous/anxious. Physical Exam:     Vitals:  /64   Pulse 65   Resp 18   Ht 5' 10\" (1.778 m)   Wt 170 lb (77.1 kg)   SpO2 96%   BMI 24.39 kg/m²       Physical Exam  Vitals reviewed. Constitutional:       General: He is not in acute distress. Appearance: He is well-developed. HENT:      Head: Normocephalic and atraumatic. Neck:      Thyroid: No thyromegaly. Cardiovascular:      Rate and Rhythm: Normal rate and regular rhythm. Heart sounds: Murmur heard. Pulmonary:      Effort: Pulmonary effort is normal.      Breath sounds: Normal breath sounds. No wheezing or rales. Abdominal:      General: Bowel sounds are normal. There is no distension. Palpations: Abdomen is soft. There is no mass. Tenderness: There is no abdominal tenderness. Musculoskeletal:         General: Normal range of motion. Right lower leg: No edema. Left lower leg: No edema. Lymphadenopathy:      Cervical: No cervical adenopathy. Skin:     General: Skin is warm and dry. Coloration: Skin is not jaundiced or pale. Findings: No rash. Neurological:      General: No focal deficit present. Mental Status: He is alert and oriented to person, place, and time. Mental status is at baseline. Psychiatric:         Mood and Affect: Mood normal.         Behavior: Behavior normal.         Thought Content:  Thought content normal.         Judgment: Judgment normal.               Data:     Lab Results   Component Value Date     09/23/2021    K 4.1 09/23/2021    CL 99 09/23/2021    CO2 24 09/23/2021    BUN 13 09/23/2021    CREATININE 1.13 09/23/2021    GLUCOSE 207 09/23/2021    GLUCOSE 110 11/15/2011    PROT 6.8 09/23/2021    LABALBU 3.9 09/23/2021    BILITOT 0.54 09/23/2021    ALKPHOS 92 09/23/2021    AST 17 09/23/2021    ALT 18 09/23/2021     Lab Results   Component Value Date    WBC 5.7 09/23/2021    RBC 5.34 09/23/2021    RBC 4.51 11/15/2011    HGB 14.9 09/23/2021    HCT 43.8 09/23/2021    MCV 82.0 09/23/2021    MCH 27.9 09/23/2021    MCHC 34.0 09/23/2021    RDW 15.0 09/23/2021     09/23/2021     11/15/2011    MPV NOT REPORTED 09/23/2021     Lab Results   Component Value Date    TSH 2.39 08/30/2021     Lab Results   Component Value Date    CHOL 167 08/30/2021    HDL 40 08/30/2021    PSA 0.72 07/25/2017    LABA1C 5.7 09/16/2021          Assessment & Plan        Diagnosis Orders   1. Primary hypertension   BP relatively low. Was started on Amlodipine, Imdur recently by Dr. Jil Lopez. Also on Imdur, Lopressor, Lisinopril, increased dose Propranolol. Advised to discuss with Dr. Jil Lopez meds adjustment. 2. Gastroesophageal reflux disease without esophagitis   Symptoms controlled, continue on Omeprazole. 3. Hyperlipidemia, unspecified hyperlipidemia type  Lipid panel controlled, continue on  Lipitor    4. Coronary artery disease involving native heart, unspecified vessel or lesion type, unspecified whether angina present   Continue medical management. Follow up with Dr. Jil Lopez. Completed Refills   Requested Prescriptions      No prescriptions requested or ordered in this encounter     No follow-ups on file. No orders of the defined types were placed in this encounter. No orders of the defined types were placed in this encounter. There are no Patient Instructions on file for this visit.     Electronically signed by Acacia Issa MD on 10/5/2021 at 2:31 PM           Completed Refills      Requested Prescriptions      No prescriptions requested or ordered in this encounter

## 2021-10-11 ENCOUNTER — OFFICE VISIT (OUTPATIENT)
Dept: CARDIOLOGY CLINIC | Age: 86
End: 2021-10-11
Payer: MEDICARE

## 2021-10-11 VITALS
SYSTOLIC BLOOD PRESSURE: 122 MMHG | HEART RATE: 65 BPM | OXYGEN SATURATION: 97 % | WEIGHT: 167 LBS | BODY MASS INDEX: 23.96 KG/M2 | DIASTOLIC BLOOD PRESSURE: 76 MMHG

## 2021-10-11 DIAGNOSIS — I20.8 STABLE ANGINA PECTORIS (HCC): Primary | ICD-10-CM

## 2021-10-11 PROCEDURE — G8484 FLU IMMUNIZE NO ADMIN: HCPCS | Performed by: INTERNAL MEDICINE

## 2021-10-11 PROCEDURE — 1036F TOBACCO NON-USER: CPT | Performed by: INTERNAL MEDICINE

## 2021-10-11 PROCEDURE — G8428 CUR MEDS NOT DOCUMENT: HCPCS | Performed by: INTERNAL MEDICINE

## 2021-10-11 PROCEDURE — 4040F PNEUMOC VAC/ADMIN/RCVD: CPT | Performed by: INTERNAL MEDICINE

## 2021-10-11 PROCEDURE — G8420 CALC BMI NORM PARAMETERS: HCPCS | Performed by: INTERNAL MEDICINE

## 2021-10-11 PROCEDURE — 1123F ACP DISCUSS/DSCN MKR DOCD: CPT | Performed by: INTERNAL MEDICINE

## 2021-10-11 PROCEDURE — 99214 OFFICE O/P EST MOD 30 MIN: CPT | Performed by: INTERNAL MEDICINE

## 2021-10-11 NOTE — PROGRESS NOTES
Ov Dr. Aaliyah Gunn for 2 week follow up   Last visit : Will INCREASE Imdur 30 mg to one full tablet twice a day    Will INCREASE Propranolol (inderal) [id number]   One tablet twice a day   Will ADD Norvasc 5 mg one tablet daily   States since changes  bp on low side   And getting dizzy  Chest discomfort is better   But now dizzy - low pt   And no energy       STOP Lisinopril (Prinivil;Zestril)    Follow up in 2 weeks

## 2021-10-11 NOTE — LETTER
Lindsay Adam M.D. [address] [place] [address]  [phone]          2021          Selene Patel MD  [address] [facility], [address] [facility]      RE:   Steven Lindsey  :  1935      Dear Dr. Wero Jacobson:    CHIEF COMPLAINT:  1. Chest pain consistent with angina. 2.  Hypotension. HISTORY OF PRESENT ILLNESS:  I had the pleasure of seeing Mr. Steven Lindsey in the office on 10/11/2021. As you know, he had redeveloped chest pain and chest discomfort. I had seen him on 2021, and at that time, he was doing well with no chest pain or chest discomfort. On , he started developing chest pain and chest discomfort when he would do activity. I therefore saw him on 2021. I increased his Imdur to 30 mg b.i.d. and Inderal to 160 mg b.i.d. from 80 mg t.i.d. We also started amlodipine 5 mg daily and brought him in today for evaluation. His chest pain is better; however, he has now developed dizziness because of his pressure being low in the 100 range. He has had no syncope or near syncope. If Mr. Laly Corona quits or decreases his any of his medications, he develops more chest pain. His medications today are Norvasc 5 mg daily, Eliquis 5 mg b.i.d., Lipitor 80 mg daily, Plavix 75 mg daily, Imdur 30 mg b.i.d., Prilosec 20 mg daily, Inderal  mg b.i.d., Zoloft 25 mg daily, and lisinopril 5 mg daily. His blood pressure was 100/60 with a heart rate of 65 and regular, respiratory rate 18, O2 sat 97%, weight 167 pounds. Exam was unremarkable. IMPRESSION:  Angina, better on the medications, although now hypotensive. PLAN:  1. Stop lisinopril. 2.  Continue other medications and see him back in 2 weeks. 3.  He will take his blood pressure daily. DISCUSSION:  Mr. Frannie Clifton angina is better on the increased dose of medications, but he has developed hypotension.   Therefore, we will stop his lisinopril which should not affect his chest pain. I suspect again that we are headed for cardiac catheterization. However, at this time, catheterizations are only being done on emergent cases and no elective cases. Therefore, at this time, we cannot do a catheterization. We will try to get by with medications. Thank you very much.     Sincerely,        Carina Dupree    D: 10/11/2021 13:17:20     T: 10/11/2021 13:19:39     [id number]  Job#: [id number]   Doc#: [id number]

## 2021-10-12 NOTE — PROGRESS NOTES
Brian White M.D. [address] [place] [address]  [phone]          2021          Evelio Gonzalez MD  [address] [facility], [address] [facility]      RE:   Harper Cam  :  1935      Dear Dr. Samantha Manzano:    CHIEF COMPLAINT:  1. Chest pain consistent with angina. 2.  Hypotension. HISTORY OF PRESENT ILLNESS:  I had the pleasure of seeing Mr. Harper Cam in the office on 10/11/2021. As you know, he had redeveloped chest pain and chest discomfort. I had seen him on 2021, and at that time, he was doing well with no chest pain or chest discomfort. On , he started developing chest pain and chest discomfort when he would do activity. I therefore saw him on 2021. I increased his Imdur to 30 mg b.i.d. and Inderal to 160 mg b.i.d. from 80 mg t.i.d. We also started amlodipine 5 mg daily and brought him in today for evaluation. His chest pain is better; however, he has now developed dizziness because of his pressure being low in the 100 range. He has had no syncope or near syncope. If Mr. Dilip Nguyen quits or decreases his any of his medications, he develops more chest pain. His medications today are Norvasc 5 mg daily, Eliquis 5 mg b.i.d., Lipitor 80 mg daily, Plavix 75 mg daily, Imdur 30 mg b.i.d., Prilosec 20 mg daily, Inderal  mg b.i.d., Zoloft 25 mg daily, and lisinopril 5 mg daily. His blood pressure was 100/60 with a heart rate of 65 and regular, respiratory rate 18, O2 sat 97%, weight 167 pounds. Exam was unremarkable. IMPRESSION:  Angina, better on the medications, although now hypotensive. PLAN:  1. Stop lisinopril. 2.  Continue other medications and see him back in 2 weeks. 3.  He will take his blood pressure daily. DISCUSSION:  Mr. Marta Bro angina is better on the increased dose of medications, but he has developed hypotension.   Therefore, we will stop his lisinopril which should not affect his chest pain. I suspect again that we are headed for cardiac catheterization. However, at this time, catheterizations are only being done on emergent cases and no elective cases. Therefore, at this time, we cannot do a catheterization. We will try to get by with medications. Thank you very much.     Sincerely,        Garret Ridley    D: 10/11/2021 13:17:20     T: 10/11/2021 13:19:39     [id number]  Job#: [id number]   Doc#: [id number]

## 2021-10-25 ENCOUNTER — APPOINTMENT (OUTPATIENT)
Dept: CT IMAGING | Age: 86
End: 2021-10-25
Payer: MEDICARE

## 2021-10-25 ENCOUNTER — OFFICE VISIT (OUTPATIENT)
Dept: CARDIOLOGY CLINIC | Age: 86
End: 2021-10-25
Payer: MEDICARE

## 2021-10-25 ENCOUNTER — HOSPITAL ENCOUNTER (EMERGENCY)
Age: 86
Discharge: HOME | End: 2021-10-25
Attending: EMERGENCY MEDICINE
Payer: MEDICARE

## 2021-10-25 VITALS
DIASTOLIC BLOOD PRESSURE: 85 MMHG | BODY MASS INDEX: 24.02 KG/M2 | HEIGHT: 70 IN | TEMPERATURE: 97.9 F | WEIGHT: 167.8 LBS | RESPIRATION RATE: 17 BRPM | OXYGEN SATURATION: 97 % | SYSTOLIC BLOOD PRESSURE: 132 MMHG | HEART RATE: 77 BPM

## 2021-10-25 VITALS
SYSTOLIC BLOOD PRESSURE: 120 MMHG | OXYGEN SATURATION: 98 % | HEART RATE: 97 BPM | WEIGHT: 167 LBS | DIASTOLIC BLOOD PRESSURE: 80 MMHG | BODY MASS INDEX: 23.96 KG/M2

## 2021-10-25 DIAGNOSIS — R51.9 NONINTRACTABLE EPISODIC HEADACHE, UNSPECIFIED HEADACHE TYPE: Primary | ICD-10-CM

## 2021-10-25 DIAGNOSIS — R06.02 SOB (SHORTNESS OF BREATH): Primary | ICD-10-CM

## 2021-10-25 DIAGNOSIS — J11.1 INFLUENZA-LIKE ILLNESS: ICD-10-CM

## 2021-10-25 LAB
ABSOLUTE EOS #: 0.1 K/UL (ref 0–0.4)
ABSOLUTE IMMATURE GRANULOCYTE: ABNORMAL K/UL (ref 0–0.3)
ABSOLUTE LYMPH #: 0.9 K/UL (ref 1–4.8)
ABSOLUTE MONO #: 1.5 K/UL (ref 0–1)
ANION GAP SERPL CALCULATED.3IONS-SCNC: 10 MMOL/L (ref 9–17)
BASOPHILS # BLD: 0 % (ref 0–2)
BASOPHILS ABSOLUTE: 0 K/UL (ref 0–0.2)
BUN BLDV-MCNC: 15 MG/DL (ref 8–23)
BUN/CREAT BLD: 14 (ref 9–20)
CALCIUM SERPL-MCNC: 8.8 MG/DL (ref 8.6–10.4)
CHLORIDE BLD-SCNC: 101 MMOL/L (ref 98–107)
CO2: 22 MMOL/L (ref 20–31)
CREAT SERPL-MCNC: 1.07 MG/DL (ref 0.7–1.2)
DIFFERENTIAL TYPE: YES
EOSINOPHILS RELATIVE PERCENT: 1 % (ref 0–5)
GFR AFRICAN AMERICAN: >60 ML/MIN
GFR NON-AFRICAN AMERICAN: >60 ML/MIN
GFR SERPL CREATININE-BSD FRML MDRD: ABNORMAL ML/MIN/{1.73_M2}
GFR SERPL CREATININE-BSD FRML MDRD: ABNORMAL ML/MIN/{1.73_M2}
GLUCOSE BLD-MCNC: 125 MG/DL (ref 70–99)
HCT VFR BLD CALC: 41.1 % (ref 41–53)
HEMOGLOBIN: 13.8 G/DL (ref 13.5–17.5)
IMMATURE GRANULOCYTES: ABNORMAL %
LACTIC ACID: 1 MMOL/L (ref 0.5–2.2)
LYMPHOCYTES # BLD: 9 % (ref 13–44)
MCH RBC QN AUTO: 27.4 PG (ref 26–34)
MCHC RBC AUTO-ENTMCNC: 33.6 G/DL (ref 31–37)
MCV RBC AUTO: 81.5 FL (ref 80–100)
MONOCYTES # BLD: 15 % (ref 5–9)
NRBC AUTOMATED: ABNORMAL PER 100 WBC
PDW BLD-RTO: 14.2 % (ref 12.1–15.2)
PLATELET # BLD: 231 K/UL (ref 140–450)
PLATELET ESTIMATE: ABNORMAL
PMV BLD AUTO: ABNORMAL FL (ref 6–12)
POTASSIUM SERPL-SCNC: 4 MMOL/L (ref 3.7–5.3)
RBC # BLD: 5.04 M/UL (ref 4.5–5.9)
RBC # BLD: ABNORMAL 10*6/UL
SARS-COV-2, RAPID: NOT DETECTED
SEG NEUTROPHILS: 75 % (ref 39–75)
SEGMENTED NEUTROPHILS ABSOLUTE COUNT: 7.6 K/UL (ref 2.1–6.5)
SODIUM BLD-SCNC: 133 MMOL/L (ref 135–144)
SPECIMEN DESCRIPTION: NORMAL
WBC # BLD: 10 K/UL (ref 3.5–11)
WBC # BLD: ABNORMAL 10*3/UL

## 2021-10-25 PROCEDURE — 80048 BASIC METABOLIC PNL TOTAL CA: CPT

## 2021-10-25 PROCEDURE — 83605 ASSAY OF LACTIC ACID: CPT

## 2021-10-25 PROCEDURE — 99283 EMERGENCY DEPT VISIT LOW MDM: CPT

## 2021-10-25 PROCEDURE — 6360000002 HC RX W HCPCS: Performed by: EMERGENCY MEDICINE

## 2021-10-25 PROCEDURE — G8484 FLU IMMUNIZE NO ADMIN: HCPCS | Performed by: INTERNAL MEDICINE

## 2021-10-25 PROCEDURE — 1036F TOBACCO NON-USER: CPT | Performed by: INTERNAL MEDICINE

## 2021-10-25 PROCEDURE — 36415 COLL VENOUS BLD VENIPUNCTURE: CPT

## 2021-10-25 PROCEDURE — 99214 OFFICE O/P EST MOD 30 MIN: CPT | Performed by: INTERNAL MEDICINE

## 2021-10-25 PROCEDURE — G8428 CUR MEDS NOT DOCUMENT: HCPCS | Performed by: INTERNAL MEDICINE

## 2021-10-25 PROCEDURE — 96374 THER/PROPH/DIAG INJ IV PUSH: CPT

## 2021-10-25 PROCEDURE — 85025 COMPLETE CBC W/AUTO DIFF WBC: CPT

## 2021-10-25 PROCEDURE — 2580000003 HC RX 258: Performed by: EMERGENCY MEDICINE

## 2021-10-25 PROCEDURE — G8420 CALC BMI NORM PARAMETERS: HCPCS | Performed by: INTERNAL MEDICINE

## 2021-10-25 PROCEDURE — 4040F PNEUMOC VAC/ADMIN/RCVD: CPT | Performed by: INTERNAL MEDICINE

## 2021-10-25 PROCEDURE — 87635 SARS-COV-2 COVID-19 AMP PRB: CPT

## 2021-10-25 PROCEDURE — C9803 HOPD COVID-19 SPEC COLLECT: HCPCS

## 2021-10-25 PROCEDURE — 1123F ACP DISCUSS/DSCN MKR DOCD: CPT | Performed by: INTERNAL MEDICINE

## 2021-10-25 PROCEDURE — 70450 CT HEAD/BRAIN W/O DYE: CPT

## 2021-10-25 RX ORDER — PROCHLORPERAZINE MALEATE 10 MG
10 TABLET ORAL EVERY 6 HOURS PRN
Qty: 20 TABLET | Refills: 0 | Status: SHIPPED | OUTPATIENT
Start: 2021-10-25 | End: 2022-09-11

## 2021-10-25 RX ORDER — DIPHENHYDRAMINE HYDROCHLORIDE 50 MG/ML
50 INJECTION INTRAMUSCULAR; INTRAVENOUS ONCE
Status: COMPLETED | OUTPATIENT
Start: 2021-10-25 | End: 2021-10-25

## 2021-10-25 RX ORDER — IBUPROFEN 400 MG/1
400 TABLET ORAL EVERY 6 HOURS PRN
Qty: 20 TABLET | Refills: 0 | Status: SHIPPED | OUTPATIENT
Start: 2021-10-25 | End: 2022-09-13

## 2021-10-25 RX ORDER — 0.9 % SODIUM CHLORIDE 0.9 %
1000 INTRAVENOUS SOLUTION INTRAVENOUS ONCE
Status: COMPLETED | OUTPATIENT
Start: 2021-10-25 | End: 2021-10-25

## 2021-10-25 RX ORDER — KETOROLAC TROMETHAMINE 30 MG/ML
15 INJECTION, SOLUTION INTRAMUSCULAR; INTRAVENOUS ONCE
Status: DISCONTINUED | OUTPATIENT
Start: 2021-10-25 | End: 2021-10-25 | Stop reason: HOSPADM

## 2021-10-25 RX ORDER — PROCHLORPERAZINE EDISYLATE 5 MG/ML
10 INJECTION INTRAMUSCULAR; INTRAVENOUS EVERY 6 HOURS PRN
Status: DISCONTINUED | OUTPATIENT
Start: 2021-10-25 | End: 2021-10-25 | Stop reason: HOSPADM

## 2021-10-25 RX ADMIN — SODIUM CHLORIDE 1000 ML: 9 INJECTION, SOLUTION INTRAVENOUS at 14:18

## 2021-10-25 RX ADMIN — DIPHENHYDRAMINE HYDROCHLORIDE 50 MG: 50 INJECTION, SOLUTION INTRAMUSCULAR; INTRAVENOUS at 14:23

## 2021-10-25 ASSESSMENT — PAIN SCALES - GENERAL
PAINLEVEL_OUTOF10: 10
PAINLEVEL_OUTOF10: 2

## 2021-10-25 ASSESSMENT — PAIN DESCRIPTION - PAIN TYPE
TYPE: ACUTE PAIN
TYPE: ACUTE PAIN

## 2021-10-25 ASSESSMENT — PAIN DESCRIPTION - ORIENTATION: ORIENTATION: RIGHT

## 2021-10-25 ASSESSMENT — PAIN DESCRIPTION - DESCRIPTORS: DESCRIPTORS: ACHING

## 2021-10-25 ASSESSMENT — PAIN DESCRIPTION - LOCATION
LOCATION: HEAD
LOCATION: HEAD

## 2021-10-25 NOTE — LETTER
Licha Stallworth M.D. [address] [place] [address]  [phone]          2021          Alisha Segundo MD  [address] [facility], [address] [place] [address]      RE:   Rosmery Peters  :  1935      Dear Dr. Refugio Elizabeth:    CHIEF COMPLAINT:  1. Severe headache that started on 10/23/2021, at 7 o'clock in the evening, more severe than any other pain with cough and nausea. 2.  Chest pain consistent with angina that is improved with medication. HISTORY OF PRESENT ILLNESS:  I met with Mr. Rosmery Peters on 10/25/2021. As you know, I saw him on , and at that time, he was doing well with no chest pain or chest discomfort. He then on , was seen in the emergency room for angina and I saw him in follow up on 2021. We started him on Imdur and brought him back on 10/11/2021. At that time, his chest pain was improved, but he was hypotensive when I stopped his lisinopril, brought him back for reevaluation. He developed a very severe pain in his head approximately 2 days ago 7 o'clock in the evening. He has been \"shaking\" for 2 days with his \"body jumping. \"  He also developed some nausea, but no vomiting. He feels somewhat nauseated. He still has a severe pain in his head. He does complain of shortness of breath with activity which he has had now since the beginning of his angina. He has had no syncope or near syncope. He denies any palpitations. He did develop a cough 2 days ago, that is getting worse associated with also some wheezing. MEDICATIONS:  His medications are Norvasc 5 mg daily, Eliquis 5 mg b.i.d., Lipitor 80 mg daily, Plavix 75 mg daily, Imdur 30 mg b.i.d., Prilosec 20 mg daily, Zofran 4 mg p.r.n., Inderal LA 80 mg t.i.d. and Zoloft 25 mg daily. PHYSICAL EXAMINATION:  VITAL SIGNS:  His blood pressure was 120/80 with a heart rate of 97 and regular. Respiratory rate 18. O2 saturation was 98%. Weight 167 pounds. HEENT:  The pupils are equally round and intact. Mucous membranes were dry. NECK:  No JVD. Good carotid pulses. No carotid bruits. No lymphadenopathy or thyromegaly. CARDIOVASCULAR EXAM:  S1 and S2 were normal.  No S3 or S4. Soft systolic blowing type murmur. No diastolic murmur. PMI was normal.  No lift, thrust, or pericardial friction rub. LUNGS:  Quite clear to auscultation and percussion. ABDOMEN:  Soft and nontender. Good bowel sounds. EXTREMITIES:  Good femoral pulses. Good pedal pulses. No pedal edema. IMPRESSION:  1. Angina, improved on full medical therapy with less chest pressure. 2.  Shortness of breath which is worse in the last several days; although, no evidence of CHF. 3.  Severe headache which is the most severe that he has had in his life starting on Saturday, 10/23, at 7 o'clock in the evening and which is continuing, question new CNS event. 4.  Increased shortness of breath with a cough, question early pneumonia versus COVID. PLAN:  We will take to ER now for full set of blood work as well as CT scan of his head, chest x-ray, etc.    DISCUSSION:  Mr. Gladys Serrano, from a cardiac standpoint, was doing better. He had less chest pain on full medical therapy. He developed, however, a severe headache which is the worse that he has ever had starting on Saturday evening at 7 o'clock. He has also developed a cough now with some nausea. He will be, of course, checked for COVID and he will get a CT scan of his head. He will get a full set of blood work. I talked to Dr. Walter Carlton in the emergency room and we took Mr. Gladys Serrano from my office to the emergency room where he will be worked up by Dr. Walter Carlton. Thank you very much.     Sincerely,        Nai Steinberg    D: 10/25/2021 13:33:52     T: 10/25/2021 13:37:14     [id number]  Job#: [id number]   Doc#: [id number]

## 2021-10-25 NOTE — ED PROVIDER NOTES
HPI:  10/25/21,   Time: 1:44 PM EDT         Felicitas Gonzalez is a 82 y.o. male presenting to the ED for gradual onset of severe right sided headache felt like a knife through his eye and also complained of rigors and chills, beginning 1 day ago. The complaint has been constant, moderate in severity, and worsened by nothing. No alleviating factors. Recently has been vaccinated and received booster shot for Covid. No chest pain or shortness of breath and cough is nonproductive. Complains of malaise as well    ROS:   Pertinent positives and negatives are stated within HPI, all other systems reviewed and are negative.  --------------------------------------------- PAST HISTORY ---------------------------------------------  Past Medical History:  has a past medical history of Anginal pain ([address] CAD (coronary artery disease), CVA (cerebral vascular accident) ([address] CVA (cerebral vascular accident) ([address] Diverticula of colon, Hyperlipidemia, Hypertension, and Kidney stone. Past Surgical History:  has a past surgical history that includes Cardiac surgery; Stomach surgery; Cardiac catheterization (Left, 12/24/14); Cardiac catheterization (5/4/2007); Cardiac catheterization (5/4/07 con't); Cardiac catheterization (6/12/2008); Percutaneous Transluminal Coronary Angio (3/9/2011); joint replacement (Right, 1998 and 2012 ); joint replacement (Right, 2012); Cholecystectomy; Dilatation, esophagus; Abdomen surgery; colectomy; hernia repair (Right); eye surgery (Bilateral); Appendectomy; Colonoscopy; Colonoscopy (03-); angioplasty (06/29/2016); pr esophagogastroduodenoscopy transoral diagnostic (N/A, 6/14/2017); Upper gastrointestinal endoscopy; and angioplasty (10/02/2018). Social History:  reports that he quit smoking about 54 years ago. He smoked 10.00 packs per day. He has never used smokeless tobacco. He reports that he does not drink alcohol and does not use drugs.     Family History: family history includes Heart American >60 >60 mL/min    GFR African American >60 >60 mL/min    GFR Comment          GFR Staging NOT REPORTED    Lactic Acid   Result Value Ref Range    Lactic Acid 1.0 0.5 - 2.2 mmol/L       RADIOLOGY:  Interpreted by Radiologist.  CT Head WO Contrast   Final Result      Symmetric atrophy. Negative.                   ------------------------- NURSING NOTES AND VITALS REVIEWED ---------------------------   The nursing notes within the ED encounter and vital signs as below have been reviewed. /85   Pulse 77   Temp 97.9 °F (36.6 °C) (Oral)   Resp 17   Ht 5' 10\" (1.778 m)   Wt 167 lb 12.8 oz (76.1 kg)   SpO2 97%   BMI 24.08 kg/m²   Oxygen Saturation Interpretation: Normal      ---------------------------------------------------PHYSICAL EXAM--------------------------------------      Constitutional/General: Alert and oriented x3, mildly ill appearing, non toxic in NAD  Head: NC/AT  Eyes: PERRL, EOMI  Mouth: Oropharynx clear, handling secretions, no trismus  Neck: Supple, full ROM, no meningeal signs  Pulmonary: Lungs clear to auscultation bilaterally, no wheezes, rales, or rhonchi. Not in respiratory distress  Cardiovascular:  Regular rate and rhythm, no murmurs, gallops, or rubs. 2+ distal pulses  Abdomen: Soft, non tender, non distended,   Extremities: Moves all extremities x 4.  Warm and well perfused  Skin: warm and dry without rash  Neurologic: GCS 15, no focal deficits  Psych: Normal Affect      ------------------------------ ED COURSE/MEDICAL DECISION MAKING----------------------  Medications   0.9 % sodium chloride bolus (1,000 mLs IntraVENous New Bag 10/25/21 1418)   prochlorperazine (COMPAZINE) injection 10 mg (has no administration in time range)   ketorolac (TORADOL) injection 15 mg (has no administration in time range)   diphenhydrAMINE (BENADRYL) injection 50 mg (50 mg IntraVENous Given 10/25/21 1429)         Medical Decision Making:    Headache and flulike symptoms ED course: Patient feeling significantly better after medications and fluids    Counseling: The emergency provider has spoken with the patient and discussed todays results, in addition to providing specific details for the plan of care and counseling regarding the diagnosis and prognosis. Questions are answered at this time and they are agreeable with the plan.      --------------------------------- IMPRESSION AND DISPOSITION ---------------------------------    IMPRESSION  1. Nonintractable episodic headache, unspecified headache type Controlled   2.  Influenza-like illness Stable       DISPOSITION  Disposition: Discharge to home  Patient condition is stable                  Orin Tarango MD  10/25/21 4407

## 2021-10-25 NOTE — PROGRESS NOTES
Ov Dr. Tera Olivas for follow up   Has been in bed x 2 days   Because of \"pain in head\"   Has been shaking x 2 days   Body jumping/shaking bad   Didn't take meds today   Stomach upset - no bowel movement  X 2 days/not new  Still some nauseated   Chest pain is better   Sob is little worse   Was taking shower and was   \"huffing and puffing' which is new   Prior to 2 days ago no chest pressure  Able to lay flat at night  C/o cough getting worse/wheezing       Sending to ER ASAP

## 2021-10-28 NOTE — PROGRESS NOTES
Jean Enciso M.D. [address] [place] [address]  [phone]          2021          Griselda Ahle, MD  [facility], [address] [facility]      RE:   Anders Escalante  :  1935      Dear Dr. Мария Hernández:    CHIEF COMPLAINT:  1. Severe headache that started on 10/23/2021, at 7 o'clock in the evening, more severe than any other pain with cough and nausea. 2.  Chest pain consistent with angina that is improved with medication. HISTORY OF PRESENT ILLNESS:  I met with Mr. Anders Escalante on 10/25/2021. As you know, I saw him on , and at that time, he was doing well with no chest pain or chest discomfort. He then on , was seen in the emergency room for angina and I saw him in follow up on 2021. We started him on Imdur and brought him back on 10/11/2021. At that time, his chest pain was improved, but he was hypotensive when I stopped his lisinopril, brought him back for reevaluation. He developed a very severe pain in his head approximately 2 days ago 7 o'clock in the evening. He has been \"shaking\" for 2 days with his \"body jumping. \"  He also developed some nausea, but no vomiting. He feels somewhat nauseated. He still has a severe pain in his head. He does complain of shortness of breath with activity which he has had now since the beginning of his angina. He has had no syncope or near syncope. He denies any palpitations. He did develop a cough 2 days ago, that is getting worse associated with also some wheezing. MEDICATIONS:  His medications are Norvasc 5 mg daily, Eliquis 5 mg b.i.d., Lipitor 80 mg daily, Plavix 75 mg daily, Imdur 30 mg b.i.d., Prilosec 20 mg daily, Zofran 4 mg p.r.n., Inderal LA 80 mg t.i.d. and Zoloft 25 mg daily. PHYSICAL EXAMINATION:  VITAL SIGNS:  His blood pressure was 120/80 with a heart rate of 97 and regular. Respiratory rate 18. O2 saturation was 98%. Weight 167 pounds. HEENT:  The pupils are equally round and intact. Mucous membranes were dry. NECK:  No JVD. Good carotid pulses. No carotid bruits. No lymphadenopathy or thyromegaly. CARDIOVASCULAR EXAM:  S1 and S2 were normal.  No S3 or S4. Soft systolic blowing type murmur. No diastolic murmur. PMI was normal.  No lift, thrust, or pericardial friction rub. LUNGS:  Quite clear to auscultation and percussion. ABDOMEN:  Soft and nontender. Good bowel sounds. EXTREMITIES:  Good femoral pulses. Good pedal pulses. No pedal edema. IMPRESSION:  1. Angina, improved on full medical therapy with less chest pressure. 2.  Shortness of breath which is worse in the last several days; although, no evidence of CHF. 3.  Severe headache which is the most severe that he has had in his life starting on Saturday, 10/23, at 7 o'clock in the evening and which is continuing, question new CNS event. 4.  Increased shortness of breath with a cough, question early pneumonia versus COVID. PLAN:  We will take to ER now for full set of blood work as well as CT scan of his head, chest x-ray, etc.    DISCUSSION:  Mr. Mortimer Primer, from a cardiac standpoint, was doing better. He had less chest pain on full medical therapy. He developed, however, a severe headache which is the worse that he has ever had starting on Saturday evening at 7 o'clock. He has also developed a cough now with some nausea. He will be, of course, checked for COVID and he will get a CT scan of his head. He will get a full set of blood work. I talked to Dr. Rosaline Costello in the emergency room and we took Mr. Mortimer Primer from my office to the emergency room where he will be worked up by Dr. Rosaline Costello. Thank you very much.     Sincerely,        Anastasia Farrell    D: 10/25/2021 13:33:52     T: 10/25/2021 13:37:14     [email or web address]  Job#: [id number]   Doc#: [id number]

## 2021-12-30 ENCOUNTER — HOSPITAL ENCOUNTER (EMERGENCY)
Age: 86
Discharge: HOME | End: 2021-12-30
Attending: EMERGENCY MEDICINE
Payer: MEDICARE

## 2021-12-30 ENCOUNTER — APPOINTMENT (OUTPATIENT)
Dept: GENERAL RADIOLOGY | Age: 86
End: 2021-12-30
Payer: MEDICARE

## 2021-12-30 VITALS
HEIGHT: 70 IN | RESPIRATION RATE: 18 BRPM | WEIGHT: 160 LBS | HEART RATE: 69 BPM | DIASTOLIC BLOOD PRESSURE: 88 MMHG | TEMPERATURE: 97.7 F | OXYGEN SATURATION: 95 % | SYSTOLIC BLOOD PRESSURE: 148 MMHG | BODY MASS INDEX: 22.9 KG/M2

## 2021-12-30 DIAGNOSIS — R07.9 CHEST PAIN, UNSPECIFIED TYPE: Primary | ICD-10-CM

## 2021-12-30 LAB
ANION GAP SERPL CALCULATED.3IONS-SCNC: 15 MMOL/L (ref 9–17)
BNP INTERPRETATION: NORMAL
BUN BLDV-MCNC: 16 MG/DL (ref 8–23)
BUN/CREAT BLD: 15 (ref 9–20)
CALCIUM SERPL-MCNC: 9.8 MG/DL (ref 8.6–10.4)
CHLORIDE BLD-SCNC: 102 MMOL/L (ref 98–107)
CO2: 22 MMOL/L (ref 20–31)
CREAT SERPL-MCNC: 1.05 MG/DL (ref 0.7–1.2)
D-DIMER QUANTITATIVE: 0.28 MG/L FEU (ref 0–0.59)
EKG ATRIAL RATE: 118 BPM
EKG ATRIAL RATE: 73 BPM
EKG ATRIAL RATE: 88 BPM
EKG P AXIS: 20 DEGREES
EKG P AXIS: 67 DEGREES
EKG P AXIS: 80 DEGREES
EKG P-R INTERVAL: 176 MS
EKG P-R INTERVAL: 184 MS
EKG P-R INTERVAL: 196 MS
EKG Q-T INTERVAL: 282 MS
EKG Q-T INTERVAL: 366 MS
EKG Q-T INTERVAL: 388 MS
EKG QRS DURATION: 78 MS
EKG QRS DURATION: 80 MS
EKG QRS DURATION: 88 MS
EKG QTC CALCULATION (BAZETT): 395 MS
EKG QTC CALCULATION (BAZETT): 427 MS
EKG QTC CALCULATION (BAZETT): 442 MS
EKG R AXIS: -21 DEGREES
EKG R AXIS: -22 DEGREES
EKG R AXIS: -45 DEGREES
EKG T AXIS: 13 DEGREES
EKG T AXIS: 39 DEGREES
EKG T AXIS: 73 DEGREES
EKG VENTRICULAR RATE: 118 BPM
EKG VENTRICULAR RATE: 73 BPM
EKG VENTRICULAR RATE: 88 BPM
GFR AFRICAN AMERICAN: >60 ML/MIN
GFR NON-AFRICAN AMERICAN: >60 ML/MIN
GFR SERPL CREATININE-BSD FRML MDRD: ABNORMAL ML/MIN/{1.73_M2}
GFR SERPL CREATININE-BSD FRML MDRD: ABNORMAL ML/MIN/{1.73_M2}
GLUCOSE BLD-MCNC: 125 MG/DL (ref 70–99)
HCT VFR BLD CALC: 43.7 % (ref 41–53)
HEMOGLOBIN: 14.8 G/DL (ref 13.5–17.5)
INR BLD: 1.1
MCH RBC QN AUTO: 27.7 PG (ref 26–34)
MCHC RBC AUTO-ENTMCNC: 33.8 G/DL (ref 31–37)
MCV RBC AUTO: 82 FL (ref 80–100)
NRBC AUTOMATED: ABNORMAL PER 100 WBC
PARTIAL THROMBOPLASTIN TIME: 34.5 SEC (ref 23.9–33.8)
PDW BLD-RTO: 15.9 % (ref 12.1–15.2)
PLATELET # BLD: 315 K/UL (ref 140–450)
PMV BLD AUTO: ABNORMAL FL (ref 6–12)
POTASSIUM SERPL-SCNC: 4.2 MMOL/L (ref 3.7–5.3)
PRO-BNP: 207 PG/ML
PROTHROMBIN TIME: 13.5 SEC (ref 11.5–14.2)
RBC # BLD: 5.34 M/UL (ref 4.5–5.9)
SODIUM BLD-SCNC: 139 MMOL/L (ref 135–144)
TROPONIN INTERP: NORMAL
TROPONIN INTERP: NORMAL
TROPONIN T: NORMAL NG/ML
TROPONIN T: NORMAL NG/ML
TROPONIN, HIGH SENSITIVITY: 21 NG/L (ref 0–22)
TROPONIN, HIGH SENSITIVITY: 22 NG/L (ref 0–22)
WBC # BLD: 7.4 K/UL (ref 3.5–11)

## 2021-12-30 PROCEDURE — 6360000002 HC RX W HCPCS: Performed by: EMERGENCY MEDICINE

## 2021-12-30 PROCEDURE — 96374 THER/PROPH/DIAG INJ IV PUSH: CPT

## 2021-12-30 PROCEDURE — 85610 PROTHROMBIN TIME: CPT

## 2021-12-30 PROCEDURE — 2500000003 HC RX 250 WO HCPCS: Performed by: EMERGENCY MEDICINE

## 2021-12-30 PROCEDURE — 36415 COLL VENOUS BLD VENIPUNCTURE: CPT

## 2021-12-30 PROCEDURE — 85027 COMPLETE CBC AUTOMATED: CPT

## 2021-12-30 PROCEDURE — 99284 EMERGENCY DEPT VISIT MOD MDM: CPT

## 2021-12-30 PROCEDURE — 85379 FIBRIN DEGRADATION QUANT: CPT

## 2021-12-30 PROCEDURE — 71045 X-RAY EXAM CHEST 1 VIEW: CPT

## 2021-12-30 PROCEDURE — 6370000000 HC RX 637 (ALT 250 FOR IP): Performed by: EMERGENCY MEDICINE

## 2021-12-30 PROCEDURE — 93010 ELECTROCARDIOGRAM REPORT: CPT | Performed by: INTERNAL MEDICINE

## 2021-12-30 PROCEDURE — 80048 BASIC METABOLIC PNL TOTAL CA: CPT

## 2021-12-30 PROCEDURE — 93005 ELECTROCARDIOGRAM TRACING: CPT | Performed by: EMERGENCY MEDICINE

## 2021-12-30 PROCEDURE — 84484 ASSAY OF TROPONIN QUANT: CPT

## 2021-12-30 PROCEDURE — 96375 TX/PRO/DX INJ NEW DRUG ADDON: CPT

## 2021-12-30 PROCEDURE — 85730 THROMBOPLASTIN TIME PARTIAL: CPT

## 2021-12-30 PROCEDURE — 83880 ASSAY OF NATRIURETIC PEPTIDE: CPT

## 2021-12-30 RX ORDER — LORAZEPAM 1 MG/1
1 TABLET ORAL DAILY PRN
Qty: 12 TABLET | Refills: 0 | Status: SHIPPED | OUTPATIENT
Start: 2021-12-30 | End: 2022-01-29

## 2021-12-30 RX ORDER — FENTANYL CITRATE 50 UG/ML
50 INJECTION, SOLUTION INTRAMUSCULAR; INTRAVENOUS ONCE
Status: COMPLETED | OUTPATIENT
Start: 2021-12-30 | End: 2021-12-30

## 2021-12-30 RX ORDER — LORAZEPAM 0.5 MG/1
1 TABLET ORAL ONCE
Status: DISCONTINUED | OUTPATIENT
Start: 2021-12-30 | End: 2021-12-30 | Stop reason: HOSPADM

## 2021-12-30 RX ORDER — METOPROLOL TARTRATE 5 MG/5ML
5 INJECTION INTRAVENOUS ONCE
Status: COMPLETED | OUTPATIENT
Start: 2021-12-30 | End: 2021-12-30

## 2021-12-30 RX ORDER — NITROGLYCERIN 0.4 MG/1
0.4 TABLET SUBLINGUAL ONCE
Status: COMPLETED | OUTPATIENT
Start: 2021-12-30 | End: 2021-12-30

## 2021-12-30 RX ORDER — FENTANYL CITRATE 50 UG/ML
100 INJECTION, SOLUTION INTRAMUSCULAR; INTRAVENOUS ONCE
Status: COMPLETED | OUTPATIENT
Start: 2021-12-30 | End: 2021-12-30

## 2021-12-30 RX ADMIN — FENTANYL CITRATE 50 MCG: 50 INJECTION, SOLUTION INTRAMUSCULAR; INTRAVENOUS at 11:53

## 2021-12-30 RX ADMIN — METOROPROLOL TARTRATE 5 MG: 5 INJECTION, SOLUTION INTRAVENOUS at 11:46

## 2021-12-30 RX ADMIN — NITROGLYCERIN 0.4 MG: 0.4 TABLET SUBLINGUAL at 11:46

## 2021-12-30 RX ADMIN — FENTANYL CITRATE 100 MCG: 50 INJECTION, SOLUTION INTRAMUSCULAR; INTRAVENOUS at 12:47

## 2021-12-30 ASSESSMENT — PAIN DESCRIPTION - PAIN TYPE: TYPE: ACUTE PAIN

## 2021-12-30 ASSESSMENT — PAIN SCALES - GENERAL
PAINLEVEL_OUTOF10: 2
PAINLEVEL_OUTOF10: 8
PAINLEVEL_OUTOF10: 10
PAINLEVEL_OUTOF10: 10

## 2021-12-30 ASSESSMENT — PAIN DESCRIPTION - DESCRIPTORS: DESCRIPTORS: SHARP;SHOOTING

## 2021-12-30 ASSESSMENT — PAIN DESCRIPTION - LOCATION: LOCATION: CHEST;ARM;BACK

## 2021-12-30 ASSESSMENT — PAIN DESCRIPTION - ORIENTATION: ORIENTATION: RIGHT;LEFT

## 2021-12-30 NOTE — ED NOTES
C/o mid-sternal chest tightness, rates pain 10/10, states pain is worse now, in the middle of chest that radiates to mid back. Dr. Simmons Daughters aware, new orders received.      Read Ours, RN  12/30/21 0090

## 2021-12-30 NOTE — ED PROVIDER NOTES
EMERGENCY DEPARTMENT ENCOUNTER      CHIEF COMPLAINT    Chief Complaint   Patient presents with    Chest Pain     Pt started having chest pain about 2 hours PTA, pt has significant cardiac history, Pt tooke 2 nitro at home PTA       HPI    Favian Solis is a 82 y.o. male who presentsto ED from home. By car. With complaint of chest pain. Onset x 2 hours prior to arrival.  Intensity of symptoms moderate. Location of symptoms anterior chest.  Patient states that she took a shower at home and started having chest pain. The pain radiates from the anterior chest to his upper back. Patient also has pain radiating to his left arm. Patient has extensive history of coronary artery disease. Patient had a coronary 3 bypass grafts 2 years ago. Patient states that since his bypass surgery in 2017 she has been doing generally well with intermittent episodes of chest pain. PAST MEDICAL HISTORY    Past Medical History:   Diagnosis Date    Anginal pain ([address]     CAD (coronary artery disease)     CVA (cerebral vascular accident) ([address] 05/11/2020    CVA (cerebral vascular accident) ([address] 05/11/2020    Diverticula of colon     Hyperlipidemia     Hypertension     Kidney stone        SURGICAL HISTORY    Past Surgical History:   Procedure Laterality Date    ABDOMEN SURGERY      partial gastrectomy    ANGIOPLASTY  06/29/2016    Dr. Rowan Bergman @ [place] [address]  10/02/2018    Dr. Dominik Mauro @ [facility]-- Stent x 1    APPENDECTOMY      CARDIAC CATHETERIZATION Left 12/24/14    Dr. Juan Luis Gilbert -2 vessel CAD-    CARDIAC CATHETERIZATION  5/4/2007    Dr Dominik Mauro: 1. Normal left ventricular systolic function with an estimated ejection fraction of 70%. 2. Elevated left ventricular end-diastolic pressure following angiographic dye load.  3. Double-vessel coronary artery disease with angiographic edivence of restenosis within the intervened-upon segment in the right coronary artery and moderately severe disease just proximal     CARDIAC CATHETERIZATION  5/4/07 con't    to the stented segment in the mid left anterior descending.  CARDIAC CATHETERIZATION  6/12/2008    Dr Blanco: 1. Moderate coronary artery disease, as described, with patent stents with evidence of mild at most in-stent restenosis of the right coronary artery. 2. Normal left ventricular systolic function.  CARDIAC SURGERY      13 stents, aorta and CAD    CHOLECYSTECTOMY      COLECTOMY      COLONOSCOPY      COLONOSCOPY  03-    DILATATION, ESOPHAGUS      EYE SURGERY Bilateral     cataracts    HERNIA REPAIR Right     inguinal    JOINT REPLACEMENT Right 1998 and 2012     knee (3rd surgery)    JOINT REPLACEMENT Right 2012    hip    DE ESOPHAGOGASTRODUODENOSCOPY TRANSORAL DIAGNOSTIC N/A 6/14/2017    EGD ESOPHAGOGASTRODUODENOSCOPY; photos; bx's performed by Stanton Verdugo MD at [address] [place] [address] [facility]  3/9/2011    Dr Jama Weston. Hyperdynamic left ventricular systolic function, estimated ejection fraction of 70%. 2. Normal left ventricular end-diastolic pressure. 3. Double-vessel coronary artery disease with angiographic disease progression involving the stented segment in the mid right coronary artery consistent with angiographic evidence of restenosis.     STOMACH SURGERY      ulcer, gastric bypass due to \"bleeding ulcer\", partial colectomy due to blockage    UPPER GASTROINTESTINAL ENDOSCOPY         CURRENT MEDICATIONS    Current Outpatient Rx   Medication Sig Dispense Refill    prochlorperazine (COMPAZINE) 10 MG tablet Take 1 tablet by mouth every 6 hours as needed (Headache and nausea) 20 tablet 0    ibuprofen (IBU) 400 MG tablet Take 1 tablet by mouth every 6 hours as needed for Pain 20 tablet 0    propranolol (INDERAL LA) 160 MG extended release capsule Take 1 capsule by mouth daily (Patient not taking: Reported on 10/25/2021) 60 capsule 11    amLODIPine (NORVASC) 5 MG tablet Take 1 tablet by mouth daily 30 tablet 11    isosorbide mononitrate (IMDUR) 30 MG extended release tablet Take 1 tablet by mouth 2 times daily 60 tablet 11    nitroGLYCERIN (NITROSTAT) 0.4 MG SL tablet Place 1 tablet under the tongue every 5 minutes as needed (chest pain) 25 tablet 2    sertraline (ZOLOFT) 25 MG tablet Take 1 tablet by mouth daily 30 tablet 3    apixaban (ELIQUIS) 5 MG TABS tablet Take by mouth 2 times daily      naproxen (NAPROSYN) 375 MG tablet Take 375 mg by mouth 2 times daily as needed for Pain      atorvastatin (LIPITOR) 80 MG tablet Take 80 mg by mouth daily      propranolol (INDERAL) 80 MG tablet Take 1 tablet by mouth 3 times daily 90 tablet 11    ondansetron (ZOFRAN) 4 MG tablet Take 1 tablet by mouth every 6 hours as needed for Nausea 30 tablet 0    docusate sodium (COLACE) 100 MG capsule Take 1 capsule by mouth 3 times daily as needed for Constipation 45 capsule 0    omeprazole (PRILOSEC) 20 MG capsule Take 40 mg by mouth Daily       vitamin D (CHOLECALCIFEROL) 1000 UNIT TABS tablet Take 3,000 Units by mouth daily       clopidogrel (PLAVIX) 75 MG tablet Take 75 mg by mouth every other day          ALLERGIES    Allergies   Allergen Reactions    Pcn [Penicillins]      Large red spots    Ranolazine Other (See Comments)     dizziness  Other reaction(s): Unknown  Other reaction(s): Other (See Comments), Unknown  dizziness  Other reaction(s): Unknown    Penicillamine Other (See Comments) and Rash     Other reaction(s):  Other (See Comments), Unknown       FAMILY HISTORY    Family History   Problem Relation Age of Onset    Heart Attack Father     Heart Attack Brother        SOCIAL HISTORY    Social History     Socioeconomic History    Marital status:      Spouse name: Leon Lindsey Number of children: 3    Years of education: None    Highest education level: None   Occupational History    None   Tobacco Use    Smoking status: Former Smoker     Packs/day: 10.00     Quit date: 3/20/1967     Years since quittin.8    Smokeless tobacco: Never Used   Vaping Use    Vaping Use: Never used   Substance and Sexual Activity    Alcohol use: No    Drug use: No    Sexual activity: None   Other Topics Concern    None   Social History Narrative    None     Social Determinants of Health     Financial Resource Strain: Low Risk     Difficulty of Paying Living Expenses: Not hard at all   Food Insecurity: No Food Insecurity    Worried About Running Out of Food in the Last Year: Never true    Mague of Food in the Last Year: Never true   Transportation Needs:     Lack of Transportation (Medical): Not on file    Lack of Transportation (Non-Medical):  Not on file   Physical Activity:     Days of Exercise per Week: Not on file    Minutes of Exercise per Session: Not on file   Stress:     Feeling of Stress : Not on file   Social Connections:     Frequency of Communication with Friends and Family: Not on file    Frequency of Social Gatherings with Friends and Family: Not on file    Attends Taoist Services: Not on file    Active Member of [address] [place] [address] or Organizations: Not on file    Attends Club or Organization Meetings: Not on file    Marital Status: Not on file   Intimate Partner Violence:     Fear of Current or Ex-Partner: Not on file    Emotionally Abused: Not on file    Physically Abused: Not on file    Sexually Abused: Not on file   Housing Stability:     Unable to Pay for Housing in the Last Year: Not on file    Number of [place] in the Last Year: Not on file    Unstable Housing in the Last Year: Not on file           Review of Systems:  Constitutional: Chief anxiety and shortness of eyes:  Denies photophobia or discharge   HENT:  Denies sore throat or ear pain   Respiratory:  Denies cough or shortness of breath   Cardiovascular: Positive for chest pain GI:  Denies abdominal pain, nausea, vomiting, or diarrhea   Musculoskeletal:  Denies back pain   Skin:  Denies rash   Neurologic:  Denies headache, focal weakness or sensory changes   Endocrine:  Denies polyuria or polydypsia   Lymphatic:  Denies swollen glands   Psychiatric:  Denies depression, suicidal ideation or homicidal ideation   All systems negative except as marked. PHYSICAL EXAM    VITAL SIGNS: BP (!) 146/82   Pulse 64   Temp 97.7 °F (36.5 °C) (Oral)   Resp 11   Ht 5' 10\" (1.778 m)   Wt 160 lb (72.6 kg)   SpO2 94%   BMI 22.96 kg/m²    Constitutional: Elderly male with chest pain and anxiety on arrival HENT:  Normocephalic, Atraumatic, Bilateral external ears normal, Oropharynx moist, No oral exudates, Nose normal. Neck- Normal range of motion, No tenderness, Supple, No stridor. Eyes:  PERRL, EOMI, Conjunctiva normal, No discharge. Respiratory:  Normal breath sounds, No respiratory distress, No wheezing, No chest tenderness. Cardiovascular:  Normal heart rate, Normal rhythm, No murmurs, No rubs, No gallops. GI:  Bowel sounds normal, Soft, No tenderness, No masses, No pulsatile masses. : External genitalia appear normal, No masses or lesions. No discharge. No CVA tenderness. Musculoskeletal:  Intact distal pulses, No edema, No tenderness, No cyanosis, No clubbing. Good range of motion in all major joints. No tenderness to palpation or major deformities noted. Back- No tenderness. Integument:  Warm, Dry, No erythema, No rash. Lymphatic:  No lymphadenopathy noted. Neurologic:  Alert & oriented x 3, Normal motor function, Normal sensory function, No focal deficits noted. Psychiatric:  Affect normal, Judgment normal, Mood normal.     EKG    Sinus rhythm rate of 73 nonspecific ST changes. RADIOLOGY    XR CHEST PORTABLE   Final Result      Prior CABG. No acute change.                     PROCEDURES        Labs  Labs Reviewed   BASIC METABOLIC PANEL - Abnormal; Notable for the following components:       Result Value    Glucose 125 (*)     All other components within normal limits   APTT - Abnormal; Notable for the following components:    PTT 34.5 (*)     All other components within normal limits   CBC - Abnormal; Notable for the following components:    RDW 15.9 (*)     All other components within normal limits   BRAIN NATRIURETIC PEPTIDE   PROTIME-INR   TROPONIN   D-DIMER, QUANTITATIVE   TROPONIN             Summation      Patient Course: Patient has extensive history of coronary disease. Patient has 2 - troponins. EKG with no acute ST findings. Pt was given nitroglycerin and metoprolol in ED. Patient is given fentanyl for the pain. Patient was observed in ED for 3 hours and 30 minutes  Patient feels better. Patient is pain-free. Patient wants to go home. Patient also has anxiety. The warning signs were discussed. Patient is instructed to immediately return to ED for recurrent chest pain. ED Medications administered this visit:    Medications   nitroGLYCERIN (NITROSTAT) SL tablet 0.4 mg (0.4 mg SubLINGual Given 12/30/21 1146)   metoprolol (LOPRESSOR) injection 5 mg (5 mg IntraVENous Given 12/30/21 1146)   fentaNYL (SUBLIMAZE) injection 50 mcg (50 mcg IntraVENous Given 12/30/21 1153)   fentaNYL (SUBLIMAZE) injection 100 mcg (100 mcg IntraVENous Given 12/30/21 1247)       New Prescriptions from this visit:    New Prescriptions    No medications on file       Follow-up:  No follow-up provider specified. Final Impression:   1.  Chest pain, unspecified type               (Please note that portions of this note were completed with a voice recognition program.  Efforts were made to edit the dictations but occasionally words are mis-transcribed.)          David Ruiz MD  12/30/21 5266       David Ruiz MD  12/30/21 7045

## 2021-12-30 NOTE — ED NOTES
DR. Rashard Wyatt AT [facility] SIDE. SR ON MONITOR. COMPLAINS OF PAIN TO HIS CHEST THAT GOES TO HIS BACK.       Luciano Mendez RN  12/30/21 2022

## 2022-01-03 ENCOUNTER — TELEPHONE (OUTPATIENT)
Dept: CARDIOLOGY CLINIC | Age: 87
End: 2022-01-03

## 2022-01-03 NOTE — TELEPHONE ENCOUNTER
Rosette Longoria called to state that he was in the ED last Thursday with CP and wanted to make an appointment. Please advise---how soon and any testing needed.

## 2022-01-04 RX ORDER — AMLODIPINE BESYLATE 5 MG/1
5 TABLET ORAL DAILY
Qty: 30 TABLET | Refills: 11 | Status: SHIPPED | OUTPATIENT
Start: 2022-01-04 | End: 2022-01-11 | Stop reason: ALTCHOICE

## 2022-01-04 NOTE — TELEPHONE ENCOUNTER
Pending medication    Health Maintenance   Topic Date Due    Annual Wellness Visit (AWV)  Never done    DTaP/Tdap/Td vaccine (1 - Tdap) 10/05/2022 (Originally 4/3/1954)    Shingles Vaccine (1 of 2) 10/05/2022 (Originally 4/3/1987)    Depression Monitoring  04/01/2022    Lipid screen  08/30/2022    Flu vaccine  Completed    Pneumococcal 65+ years Vaccine  Completed    COVID-19 Vaccine  Completed    Hepatitis A vaccine  Aged Out    Hepatitis B vaccine  Aged Out    Hib vaccine  Aged Out    Meningococcal (ACWY) vaccine  Aged Out             (applicable per patient's age: Cancer Screenings, Depression Screening, Fall Risk Screening, Immunizations)    Hemoglobin A1C (%)   Date Value   09/16/2021 5.7     LDL Cholesterol (mg/dL)   Date Value   08/30/2021 88     AST (U/L)   Date Value   09/23/2021 17     ALT (U/L)   Date Value   09/23/2021 18     BUN (mg/dL)   Date Value   12/30/2021 16      (goal A1C is < 7)   (goal LDL is <100) need 30-50% reduction from baseline     BP Readings from Last 3 Encounters:   12/30/21 (!) 148/88   10/25/21 132/85   10/25/21 120/80    (goal /80)      All Future Testing planned in [facility]:  Lab Frequency Next Occurrence   TSH with Reflex Once 08/16/2022   CBC Auto Differential Once 08/16/2022   Comprehensive Metabolic Panel Once 45/90/3536   Vitamin D 25 Hydroxy Once 08/16/2022   Lipid Panel Once 08/16/2022   Magnesium Once 08/16/2022   EKG 12 Lead Once 08/16/2022   XR CHEST (2 VW) Once 08/16/2022       Next Visit Date:  Future Appointments   Date Time Provider Luz Marina Byrd   1[facility]2022  2:45 [facility]   1/11/2022 12:30 PM MD Lamar Deng [facility]   3/17/2022 11:30 AM MD Lamar Deng [facility]            Patient Active Problem List:     Hyperlipidemia     CAD (coronary artery disease)     Hypertension     Cervical pain     History of stomach ulcers     History of partial gastrectomy     Vitamin D deficiency disease Ureteral stone with hydronephrosis     BPH with obstruction/lower urinary tract symptoms     Cerebrovascular accident (CVA) due to embolism of right middle cerebral artery (HCC)     Chronic low back pain     Chronic pansinusitis     Idiopathic progressive polyneuropathy     S/P CABG x 3     Gastroesophageal reflux disease without esophagitis     Abdominal wall hernia     Generalized osteoarthritis     Shoulder pain     Atrial fibrillation ([address]

## 2022-01-06 ENCOUNTER — OFFICE VISIT (OUTPATIENT)
Dept: FAMILY MEDICINE CLINIC | Age: 87
End: 2022-01-06
Payer: MEDICARE

## 2022-01-06 VITALS
RESPIRATION RATE: 18 BRPM | OXYGEN SATURATION: 98 % | WEIGHT: 169 LBS | HEIGHT: 70 IN | DIASTOLIC BLOOD PRESSURE: 62 MMHG | SYSTOLIC BLOOD PRESSURE: 106 MMHG | TEMPERATURE: 97.1 F | HEART RATE: 67 BPM | BODY MASS INDEX: 24.2 KG/M2

## 2022-01-06 DIAGNOSIS — I10 PRIMARY HYPERTENSION: Primary | ICD-10-CM

## 2022-01-06 DIAGNOSIS — F32.A DEPRESSION, UNSPECIFIED DEPRESSION TYPE: ICD-10-CM

## 2022-01-06 DIAGNOSIS — Z00.00 ROUTINE GENERAL MEDICAL EXAMINATION AT A HEALTH CARE FACILITY: ICD-10-CM

## 2022-01-06 DIAGNOSIS — E78.5 HYPERLIPIDEMIA, UNSPECIFIED HYPERLIPIDEMIA TYPE: ICD-10-CM

## 2022-01-06 DIAGNOSIS — I20.8 STABLE ANGINA PECTORIS (HCC): ICD-10-CM

## 2022-01-06 DIAGNOSIS — I48.91 ATRIAL FIBRILLATION, UNSPECIFIED TYPE (HCC): ICD-10-CM

## 2022-01-06 PROCEDURE — G8420 CALC BMI NORM PARAMETERS: HCPCS | Performed by: INTERNAL MEDICINE

## 2022-01-06 PROCEDURE — G0438 PPPS, INITIAL VISIT: HCPCS | Performed by: INTERNAL MEDICINE

## 2022-01-06 PROCEDURE — G8427 DOCREV CUR MEDS BY ELIG CLIN: HCPCS | Performed by: INTERNAL MEDICINE

## 2022-01-06 PROCEDURE — 1036F TOBACCO NON-USER: CPT | Performed by: INTERNAL MEDICINE

## 2022-01-06 PROCEDURE — G8484 FLU IMMUNIZE NO ADMIN: HCPCS | Performed by: INTERNAL MEDICINE

## 2022-01-06 PROCEDURE — 4040F PNEUMOC VAC/ADMIN/RCVD: CPT | Performed by: INTERNAL MEDICINE

## 2022-01-06 PROCEDURE — 99214 OFFICE O/P EST MOD 30 MIN: CPT | Performed by: INTERNAL MEDICINE

## 2022-01-06 PROCEDURE — 1123F ACP DISCUSS/DSCN MKR DOCD: CPT | Performed by: INTERNAL MEDICINE

## 2022-01-06 RX ORDER — VENLAFAXINE 100 MG/1
100 TABLET ORAL 2 TIMES DAILY
Qty: 60 TABLET | Refills: 0
Start: 2022-01-06

## 2022-01-06 ASSESSMENT — PATIENT HEALTH QUESTIONNAIRE - PHQ9
SUM OF ALL RESPONSES TO PHQ QUESTIONS 1-9: 0
SUM OF ALL RESPONSES TO PHQ9 QUESTIONS 1 & 2: 0
SUM OF ALL RESPONSES TO PHQ QUESTIONS 1-9: 0
1. LITTLE INTEREST OR PLEASURE IN DOING THINGS: 0
SUM OF ALL RESPONSES TO PHQ QUESTIONS 1-9: 0
2. FEELING DOWN, DEPRESSED OR HOPELESS: 0
SUM OF ALL RESPONSES TO PHQ QUESTIONS 1-9: 0

## 2022-01-06 ASSESSMENT — ENCOUNTER SYMPTOMS
COUGH: 0
SORE THROAT: 0
ABDOMINAL PAIN: 0
ORTHOPNEA: 0
SINUS PAIN: 1
NAUSEA: 0
SHORTNESS OF BREATH: 0

## 2022-01-06 ASSESSMENT — LIFESTYLE VARIABLES: HOW OFTEN DO YOU HAVE A DRINK CONTAINING ALCOHOL: 0

## 2022-01-06 NOTE — PROGRESS NOTES
Medicare Annual Wellness Visit  Name: Mónica Mccall Date: 2022   MRN: [id number] Sex: Male   Age: 82 y.o. Ethnicity: Non- / Non    : 1935 Race: White (non-)      Ck Lim is here for 3 Month Follow-Up (HTN, no concerns. Per patient recent ED visit with chest pains. Patient follows with Dr Pau Middleton) and Medicare AWV    Screenings for behavioral, psychosocial and functional/safety risks, and cognitive dysfunction are all negative except as indicated below. These results, as well as other patient data from the [address] form, are documented in Flowsheets linked to this Encounter. Allergies   Allergen Reactions    Pcn [Penicillins]      Large red spots    Ranolazine Other (See Comments)     dizziness  Other reaction(s): Unknown  Other reaction(s): Other (See Comments), Unknown  dizziness  Other reaction(s): Unknown    Penicillamine Other (See Comments) and Rash     Other reaction(s): Other (See Comments), Unknown       Prior to Visit Medications    Medication Sig Taking? Authorizing Provider   amLODIPine (NORVASC) 5 MG tablet Take 1 tablet by mouth daily Yes Kane Keith MD   LORazepam (ATIVAN) 1 MG tablet Take 1 tablet by mouth daily as needed for Anxiety for up to 30 days.  Yes Yannick Colorado MD   ibuprofen (IBU) 400 MG tablet Take 1 tablet by mouth every 6 hours as needed for Pain Yes Christa Bowling MD   propranolol (INDERAL LA) 160 MG extended release capsule Take 1 capsule by mouth daily Yes Kane Keith MD   isosorbide mononitrate (IMDUR) 30 MG extended release tablet Take 1 tablet by mouth 2 times daily Yes Kane Keith MD   nitroGLYCERIN (NITROSTAT) 0.4 MG SL tablet Place 1 tablet under the tongue every 5 minutes as needed (chest pain) Yes Kane Keith MD   sertraline (ZOLOFT) 25 MG tablet Take 1 tablet by mouth daily Yes Peng Mercado MD   apixaban (ELIQUIS) 5 MG TABS tablet Take by mouth 2 times daily Yes Historical Provider, MD naproxen (NAPROSYN) 375 MG tablet Take 375 mg by mouth 2 times daily as needed for Pain Yes Historical Provider, MD   atorvastatin (LIPITOR) 80 MG tablet Take 80 mg by mouth daily Yes Historical Provider, MD   propranolol (INDERAL) 80 MG tablet Take 1 tablet by mouth 3 times daily Yes Josefina Zuniga MD   ondansetron (ZOFRAN) 4 MG tablet Take 1 tablet by mouth every 6 hours as needed for Nausea Yes REMY Chapa CNP   docusate sodium (COLACE) 100 MG capsule Take 1 capsule by mouth 3 times daily as needed for Constipation Yes REMY Chapa CNP   omeprazole (PRILOSEC) 20 MG capsule Take 40 mg by mouth Daily  Yes Historical Provider, MD   vitamin D (CHOLECALCIFEROL) 1000 UNIT TABS tablet Take 3,000 Units by mouth daily  Yes Historical Provider, MD   clopidogrel (PLAVIX) 75 MG tablet Take 75 mg by mouth every other day  Yes Historical Provider, MD   prochlorperazine (COMPAZINE) 10 MG tablet Take 1 tablet by mouth every 6 hours as needed (Headache and nausea)  Parisa Smith MD       Past Medical History:   Diagnosis Date    [facility]     CAD (coronary artery disease)     CVA (cerebral vascular accident) ([facility] [facility] 05/11/2020    CVA (cerebral vascular accident) ([facility] 05/11/2020    Diverticula of colon     Hyperlipidemia     Hypertension     Kidney stone        Past Surgical History:   Procedure Laterality Date    ABDOMEN SURGERY      partial gastrectomy    ANGIOPLASTY  06/29/2016    Dr. Lj Anthony @ [place] [id number]  10/02/2018    Dr. Elisa Haney @ [facility]-- Stent x 1    APPENDECTOMY      CARDIAC CATHETERIZATION Left 12/24/14    Dr. Stanislaw Sahni -2 vessel CAD-    CARDIAC CATHETERIZATION  5/4/2007    Dr Elisa Haney: 1. Normal left ventricular systolic function with an estimated ejection fraction of 70%. 2. Elevated left ventricular end-diastolic pressure following angiographic dye load.  3. Double-vessel coronary artery disease with angiographic edivence of restenosis within the intervened-upon segment in the right coronary artery and moderately severe disease just proximal     CARDIAC CATHETERIZATION  5/4/07 con't    to the stented segment in the mid left anterior descending.  CARDIAC CATHETERIZATION  6/12/2008    Dr Blanco: 1. Moderate coronary artery disease, as described, with patent stents with evidence of mild at most in-stent restenosis of the right coronary artery. 2. Normal left ventricular systolic function.  CARDIAC SURGERY      13 stents, aorta and CAD    CHOLECYSTECTOMY      COLECTOMY      COLONOSCOPY      COLONOSCOPY  03-    DILATATION, ESOPHAGUS      EYE SURGERY Bilateral     cataracts    HERNIA REPAIR Right     inguinal    JOINT REPLACEMENT Right 1998 and 2012     knee (3rd surgery)    JOINT REPLACEMENT Right 2012    hip    SD ESOPHAGOGASTRODUODENOSCOPY TRANSORAL DIAGNOSTIC N/A 6/14/2017    EGD ESOPHAGOGASTRODUODENOSCOPY; photos; bx's performed by Viki Aase, MD at [address] [place] [address] [facility]  3/9/2011    Dr Rohan Mcclelland. Hyperdynamic left ventricular systolic function, estimated ejection fraction of 70%. 2. Normal left ventricular end-diastolic pressure. 3. Double-vessel coronary artery disease with angiographic disease progression involving the stented segment in the mid right coronary artery consistent with angiographic evidence of restenosis.     STOMACH SURGERY      ulcer, gastric bypass due to \"bleeding ulcer\", partial colectomy due to blockage    UPPER GASTROINTESTINAL ENDOSCOPY         Family History   Problem Relation Age of Onset    Heart Attack Father     Heart Attack Brother        CareTeam (Including outside providers/suppliers regularly involved in providing care):   Patient Care Team:  Leigh Horan MD as PCP - General (Internal Medicine)  Leigh Horan MD as PCP - [facility]    Wt Readings from Last 3 Encounters:   01/06/22 169 lb (76.7 kg)   12/30/21 160 lb (72.6 kg)   10/25/21 167 lb 12.8 oz (76.1 kg)     Vitals: or Anger?: None of These  Do you get the social and emotional support that you need?: Yes  Do you have a Living Will?: Yes  Advance Directives     Power of  Living Will ACP-Advance Directive ACP-Power of     Not on File Not on File Not on File [address] [place] Risk Interventions:  · no issues     Hearing/Vision:  No exam data present  Hearing/Vision  Do you or your family notice any trouble with your hearing that hasn't been managed with hearing aids?: No  Do you have difficulty driving, watching TV, or doing any of your daily activities because of your eyesight?: (!) Yes  Have you had an eye exam within the past year?: Appointment is scheduled  Hearing/Vision Interventions:  · Vision concerns:  patient encouraged to make appointment with his/her eye specialist        Personalized Preventive Plan   Current Health Maintenance Status  Immunization History   Administered Date(s) Administered    COVID-19, Fajardo Peter, PF, 30mcg/0.3mL 01/21/2021, 02/11/2021, 10/06/2021    Influenza Vaccine, unspecified formulation 10/01/2016, 09/24/2018    Influenza Virus Vaccine 10/01/2017, 09/24/2018, 10/03/2019    Influenza, Quadv, IM, PF (6 mo and older Fluzone, Flulaval, Fluarix, and 3 yrs and older Afluria) 09/29/2020    Pneumococcal Conjugate 13-valent (Keri Drain) 10/01/2016    Pneumococcal Polysaccharide ([id number]) 10/01/2017        Health Maintenance   Topic Date Due    Annual Wellness Visit (AWV)  Never done    DTaP/Tdap/Td vaccine (1 - Tdap) 10/05/2022 (Originally 4/3/1954)    Shingles Vaccine (1 of 2) 10/05/2022 (Originally 4/3/1987)    Depression Monitoring  04/01/2022    Lipid screen  08/30/2022    Flu vaccine  Completed    Pneumococcal 65+ years Vaccine  Completed    COVID-19 Vaccine  Completed    Hepatitis A vaccine  Aged Out    Hepatitis B vaccine  Aged Out    Hib vaccine  Aged Out    Meningococcal (ACWY) vaccine  Aged Out     Recommendations for [place] Due: see orders and patient instructions/AVS.  . Recommended screening schedule for the next 5-10 years is provided to the patient in written form: see Patient Instructions/AVS.    There are no diagnoses linked to this encounter.

## 2022-01-06 NOTE — PATIENT INSTRUCTIONS
Personalized Preventive Plan for Rere Butt - 1/6/2022  Medicare offers a range of preventive health benefits. Some of the tests and screenings are paid in full while other may be subject to a deductible, co-insurance, and/or copay. Some of these benefits include a comprehensive review of your medical history including lifestyle, illnesses that may run in your family, and various assessments and screenings as appropriate. After reviewing your medical record and screening and assessments performed today your provider may have ordered immunizations, labs, imaging, and/or referrals for you. A list of these orders (if applicable) as well as your Preventive Care list are included within your After Visit Summary for your review. Other Preventive Recommendations:    · A preventive eye exam performed by an eye specialist is recommended every 1-2 years to screen for glaucoma; cataracts, macular degeneration, and other eye disorders. · A preventive dental visit is recommended every 6 months. · Try to get at least 150 minutes of exercise per week or 10,000 steps per day on a pedometer . · Order or download the FREE \"Exercise & Physical Activity: Your Everyday Guide\" from [employer]. Call 8-103.532.5999 or search [employer] online. · You need 8342-6525 mg of calcium and 1202-7354 IU of vitamin D per day. It is possible to meet your calcium requirement with diet alone, but a vitamin D supplement is usually necessary to meet this goal.  · When exposed to the sun, use a sunscreen that protects against both UVA and UVB radiation with an SPF of 30 or greater. Reapply every 2 to 3 hours or after sweating, drying off with a towel, or swimming. · Always wear a seat belt when traveling in a car. Always wear a helmet when riding a bicycle or motorcycle.

## 2022-01-06 NOTE — PROGRESS NOTES
HPI Notes    Name: Refugio Wheeler  : 1935         Chief Complaint:     Chief Complaint   Patient presents with    3 Month Follow-Up     HTN, no concerns. Per patient recent ED visit with chest pains. Patient follows with Dr Allegra Edmonds       History of Present Illness:        Annamaria Botello presents to office to follow up for HTN, Hyperlipidemia, Depression    Annamaria Botello presents as a follow up on his depression. Current medication for depression includes Effexor. Annamaria Botello has been on this medication for years. .  The medication is  being tolerated well. Since starting the antidepressant the symptoms of depression have improved. Annamaria Botello  is not in counciling. Annamaria Botello denies suicidal ideation. Hypertension  This is a chronic problem. The current episode started more than 1 year ago. The problem is unchanged. The problem is controlled. Associated symptoms include chest pain (intermittent). Pertinent negatives include no anxiety, headaches, orthopnea, palpitations, peripheral edema or shortness of breath. Agents associated with hypertension include NSAIDs. Past treatments include calcium channel blockers, direct vasodilators and beta blockers. The current treatment provides significant improvement. There are no compliance problems. Hypertensive end-organ damage includes CAD/MI and CVA. Hyperlipidemia  This is a chronic problem. The current episode started more than 1 year ago. The problem is controlled. Associated symptoms include chest pain (intermittent). Pertinent negatives include no myalgias or shortness of breath. Current antihyperlipidemic treatment includes ezetimibe and statins. The current treatment provides significant improvement of lipids. There are no compliance problems.             Past Medical History:     Past Medical History:   Diagnosis Date    Anginal pain ([address]     CAD (coronary artery disease)     CVA (cerebral vascular accident) ([address] 2020    CVA (cerebral vascular accident) [facility]) 05/11/2020    Diverticula of colon     Hyperlipidemia     Hypertension     Kidney stone       Reviewed all health maintenance requirements and orderedappropriate tests  Health Maintenance Due   Topic Date Due    Annual Wellness Visit (AWV)  Never done       Past Surgical History:     Past Surgical History:   Procedure Laterality Date    ABDOMEN SURGERY      partial gastrectomy    ANGIOPLASTY  06/29/2016    [place]. Mer[place] [id number]  10/02/2018    Dr. Jovi Ponce @ [facility]-- Stent x 1    APPENDECTOMY      CARDIAC CATHETERIZATION Left 12/24/14    Dr. Da Jovel -2 vessel CAD-    CARDIAC CATHETERIZATION  5/4/2007    Dr Jovi Ponce: 1. Normal left ventricular systolic function with an estimated ejection fraction of 70%. 2. Elevated left ventricular end-diastolic pressure following angiographic dye load. 3. Double-vessel coronary artery disease with angiographic edivence of restenosis within the intervened-upon segment in the right coronary artery and moderately severe disease just proximal     CARDIAC CATHETERIZATION  5/4/07 con't    to the stented segment in the mid left anterior descending.  CARDIAC CATHETERIZATION  6/12/2008    Dr Blanco: 1. Moderate coronary artery disease, as described, with patent stents with evidence of mild at most in-stent restenosis of the right coronary artery. 2. Normal left ventricular systolic function.  CARDIAC SURGERY      13 stents, aorta and CAD    CHOLECYSTECTOMY      COLECTOMY      COLONOSCOPY      COLONOSCOPY  03-    DILATATION, ESOPHAGUS      EYE SURGERY Bilateral     cataracts    HERNIA REPAIR Right     inguinal    JOINT REPLACEMENT Right 1998 and 2012     knee (3rd surgery)    JOINT REPLACEMENT Right 2012    hip    [place] ESOPHAGOGASTRODUODENOSCOPY TRANSORAL DIAGNOSTIC N/A 6/14/2017    EGD ESOPHAGOGASTRODUODENOSCOPY; photos; bx's performed by Jeremy Francois MD at [address] [place] [address] [facility]  3/9/2011    Dr Rebecca Mccracken.  Hyperdynamic left ventricular systolic function, estimated ejection fraction of 70%. 2. Normal left ventricular end-diastolic pressure. 3. Double-vessel coronary artery disease with angiographic disease progression involving the stented segment in the mid right coronary artery consistent with angiographic evidence of restenosis.  STOMACH SURGERY      ulcer, gastric bypass due to \"bleeding ulcer\", partial colectomy due to blockage    UPPER GASTROINTESTINAL ENDOSCOPY          Medications:       Prior to Admission medications    Medication Sig Start Date End Date Taking? Authorizing Provider   venlafaxine (EFFEXOR) 100 MG tablet Take 1 tablet by mouth 2 times daily 1/6/22  Yes Franco Daly MD   amLODIPine (NORVASC) 5 MG tablet Take 1 tablet by mouth daily 1/4/22  Yes Vinayak Thurston MD   LORazepam (ATIVAN) 1 MG tablet Take 1 tablet by mouth daily as needed for Anxiety for up to 30 days.  12/30/21 1/29/22 Yes Yovani Bustamante MD   ibuprofen (IBU) 400 MG tablet Take 1 tablet by mouth every 6 hours as needed for Pain 10/25/21  Yes Dominic Osborne MD   propranolol (INDERAL LA) 160 MG extended release capsule Take 1 capsule by mouth daily 9/27/21  Yes Vinayak Thurston MD   isosorbide mononitrate (IMDUR) 30 MG extended release tablet Take 1 tablet by mouth 2 times daily 9/27/21  Yes Vinayak Thurston MD   nitroGLYCERIN (NITROSTAT) 0.4 MG SL tablet Place 1 tablet under the tongue every 5 minutes as needed (chest pain) 9/24/21  Yes Vinayak Thurston MD   apixaban (ELIQUIS) 5 MG TABS tablet Take by mouth 2 times daily   Yes Historical Provider, MD   atorvastatin (LIPITOR) 80 MG tablet Take 80 mg by mouth daily   Yes Historical Provider, MD   propranolol (INDERAL) 80 MG tablet Take 1 tablet by mouth 3 times daily 3/3/20  Yes Vinayak Thurston MD   ondansetron (ZOFRAN) 4 MG tablet Take 1 tablet by mouth every 6 hours as needed for Nausea 4/10/18  Yes REMY Pacheco CNP   docusate sodium (COLACE) 100 MG capsule Take 1 capsule by mouth 3 times daily as needed for Constipation 4/10/18  Yes REMY Johnson - CNP   omeprazole (PRILOSEC) 20 MG capsule Take 40 mg by mouth Daily    Yes Historical Provider, MD   vitamin D (CHOLECALCIFEROL) 1000 UNIT TABS tablet Take 3,000 Units by mouth daily    Yes Historical Provider, MD   clopidogrel (PLAVIX) 75 MG tablet Take 75 mg by mouth every other day    Yes Historical Provider, MD   prochlorperazine (COMPAZINE) 10 MG tablet Take 1 tablet by mouth every 6 hours as needed (Headache and nausea) 10/25/21 10/30/21  Christa Bowling MD        Allergies:       Pcn [penicillins], Ranolazine, and Penicillamine    Social History:     Tobacco: reports that he quit smoking about 54 years ago. He smoked 10.00 packs per day. He has never used smokeless tobacco.  Alcohol:      reports no history of alcohol use. Drug Use:  reports no history of drug use. Family History:     Family History   Problem Relation Age of Onset    Heart Attack Father     Heart Attack Brother        Review of Systems:         Review of Systems   Constitutional: Negative for activity change, appetite change, chills, fatigue and fever. HENT: Positive for sinus pain. Negative for congestion and sore throat. Respiratory: Negative for cough and shortness of breath. Cardiovascular: Positive for chest pain (intermittent). Negative for palpitations and orthopnea. Gastrointestinal: Negative for abdominal pain and nausea. Genitourinary: Negative for dysuria. Musculoskeletal: Positive for arthralgias. Negative for myalgias. Skin: Negative for rash. Neurological: Negative for dizziness, tremors and headaches. Psychiatric/Behavioral: The patient is not nervous/anxious.           Physical Exam:     Vitals:  /62 (Site: Right Upper Arm, Position: Sitting)   Pulse 67   Temp 97.1 °F (36.2 °C)   Resp 18   Ht 5' 10\" (1.778 m)   Wt 169 lb (76.7 kg)   SpO2 98%   BMI 24.25 kg/m²       Physical Exam          Data:     Lab Results Component Value Date     12/30/2021    K 4.2 12/30/2021     12/30/2021    CO2 22 12/30/2021    BUN 16 12/30/2021    CREATININE 1.05 12/30/2021    GLUCOSE 125 12/30/2021    GLUCOSE 110 11/15/2011    PROT 6.8 09/23/2021    LABALBU 3.9 09/23/2021    BILITOT 0.54 09/23/2021    ALKPHOS 92 09/23/2021    AST 17 09/23/2021    ALT 18 09/23/2021     Lab Results   Component Value Date    WBC 7.4 12/30/2021    RBC 5.34 12/30/2021    RBC 4.51 11/15/2011    HGB 14.8 12/30/2021    HCT 43.7 12/30/2021    MCV 82.0 12/30/2021    MCH 27.7 12/30/2021    MCHC 33.8 12/30/2021    RDW 15.9 12/30/2021     12/30/2021     11/15/2011    MPV NOT REPORTED 12/30/2021     Lab Results   Component Value Date    TSH 2.39 08/30/2021     Lab Results   Component Value Date    CHOL 167 08/30/2021    HDL 40 08/30/2021    PSA 0.72 07/25/2017    LABA1C 5.7 09/16/2021          Assessment & Plan        Diagnosis Orders   1. Primary hypertension   BP well controlled,continue on current meds    2. Hyperlipidemia, unspecified hyperlipidemia type   Lipid panel controlled, continue on Lipitor    3. Depression, unspecified depression type   Mood is stable, continue on Effexor venlafaxine (EFFEXOR) 100 MG tablet   4. Atrial fibrillation, unspecified type (HCC)   Stable, on Eliquis     5. Stable angina pectoris ([facility]     6. Routine general medical examination at a health care facility                     Completed Refills   Requested Prescriptions     Signed Prescriptions Disp Refills    venlafaxine (EFFEXOR) 100 MG tablet 60 tablet 0     Sig: Take 1 tablet by mouth 2 times daily     Return for Medicare Annual Wellness Visit in 1 year. Orders Placed This Encounter   Medications    venlafaxine (EFFEXOR) 100 MG tablet     Sig: Take 1 tablet by mouth 2 times daily     Dispense:  60 tablet     Refill:  0     No orders of the defined types were placed in this encounter.         Patient Instructions     Personalized Preventive Plan for Jane Díaz - 1/6/2022  Medicare offers a range of preventive health benefits. Some of the tests and screenings are paid in full while other may be subject to a deductible, co-insurance, and/or copay. Some of these benefits include a comprehensive review of your medical history including lifestyle, illnesses that may run in your family, and various assessments and screenings as appropriate. After reviewing your medical record and screening and assessments performed today your provider may have ordered immunizations, labs, imaging, and/or referrals for you. A list of these orders (if applicable) as well as your Preventive Care list are included within your After Visit Summary for your review. Other Preventive Recommendations:    · A preventive eye exam performed by an eye specialist is recommended every 1-2 years to screen for glaucoma; cataracts, macular degeneration, and other eye disorders. · A preventive dental visit is recommended every 6 months. · Try to get at least 150 minutes of exercise per week or 10,000 steps per day on a pedometer . · Order or download the FREE \"Exercise & Physical Activity: Your Everyday Guide\" from [employer]. Call 6-251.543.6849 or search [employer] online. · You need 5979-4918 mg of calcium and 0582-3042 IU of vitamin D per day. It is possible to meet your calcium requirement with diet alone, but a vitamin D supplement is usually necessary to meet this goal.  · When exposed to the sun, use a sunscreen that protects against both UVA and UVB radiation with an SPF of 30 or greater. Reapply every 2 to 3 hours or after sweating, drying off with a towel, or swimming. · Always wear a seat belt when traveling in a car. Always wear a helmet when riding a bicycle or motorcycle.       Electronically signed by Lionel Aquino MD on 1/6/2022 at 7:14 PM           Completed Refills      Requested Prescriptions     Signed Prescriptions Disp Refills    venlafaxine (EFFEXOR) 100 MG tablet 60 tablet 0     Sig: Take 1 tablet by mouth 2 times daily

## 2022-01-11 ENCOUNTER — OFFICE VISIT (OUTPATIENT)
Dept: CARDIOLOGY CLINIC | Age: 87
End: 2022-01-11
Payer: MEDICARE

## 2022-01-11 VITALS
OXYGEN SATURATION: 96 % | HEART RATE: 78 BPM | SYSTOLIC BLOOD PRESSURE: 130 MMHG | BODY MASS INDEX: 24.11 KG/M2 | DIASTOLIC BLOOD PRESSURE: 78 MMHG | WEIGHT: 168 LBS

## 2022-01-11 DIAGNOSIS — R07.9 CHEST PAIN, UNSPECIFIED TYPE: Primary | ICD-10-CM

## 2022-01-11 PROCEDURE — G8484 FLU IMMUNIZE NO ADMIN: HCPCS | Performed by: INTERNAL MEDICINE

## 2022-01-11 PROCEDURE — 1036F TOBACCO NON-USER: CPT | Performed by: INTERNAL MEDICINE

## 2022-01-11 PROCEDURE — 1123F ACP DISCUSS/DSCN MKR DOCD: CPT | Performed by: INTERNAL MEDICINE

## 2022-01-11 PROCEDURE — 4040F PNEUMOC VAC/ADMIN/RCVD: CPT | Performed by: INTERNAL MEDICINE

## 2022-01-11 PROCEDURE — G8420 CALC BMI NORM PARAMETERS: HCPCS | Performed by: INTERNAL MEDICINE

## 2022-01-11 PROCEDURE — 99214 OFFICE O/P EST MOD 30 MIN: CPT | Performed by: INTERNAL MEDICINE

## 2022-01-11 PROCEDURE — G8428 CUR MEDS NOT DOCUMENT: HCPCS | Performed by: INTERNAL MEDICINE

## 2022-01-11 RX ORDER — AMLODIPINE BESYLATE 5 MG/1
5 TABLET ORAL 2 TIMES DAILY
COMMUNITY
End: 2022-05-26

## 2022-01-11 RX ORDER — NITROGLYCERIN 0.4 MG/1
0.4 TABLET SUBLINGUAL EVERY 5 MIN PRN
Qty: 25 TABLET | Refills: 3 | Status: SHIPPED | OUTPATIENT
Start: 2022-01-11 | End: 2022-05-26

## 2022-01-11 RX ORDER — PROPRANOLOL HYDROCHLORIDE 80 MG/1
80 TABLET ORAL 2 TIMES DAILY
COMMUNITY
End: 2022-05-26

## 2022-01-11 NOTE — PROGRESS NOTES
Ov DR Opal Cano follow up from ED  Seen on 12/30  Took shower then started having   Chest pain heaviness to back   And down arms. C/o sob which feels Is worse. Has had episodes of chest pain   2-3 times a week since in ED  Sometimes ntg helps sometimes  It doesn't  Been having chest pain on and off  For few months. Goes to ED if pain is worse. Uses inderal 180 mg if has   Pain. Increase norvasc to 5 mg bid  Call on Monday with update. Will do heart cath when [address]  back up for elective surgeries.   Will see him early February

## 2022-01-11 NOTE — LETTER
Alida Razo M.D. [address] [place] [address] [place] [address]  [phone]        2022        Angie Burger MD  [address] [facility], [address] [place] [address]    RE:   Edelmira Taylor  :  1935    Dear Dr. Romano Covert:    CHIEF COMPLAINT:  1. Chest pain and shortness of breath consistent with angina. 2.  Severe coronary artery disease. HISTORY OF PRESENT ILLNESS:  I had the pleasure of seeing Mr. Jose Gabriel in our office on 2022. He is a pleasant 82-year-old gentleman with extensive cardiac history. He had a catheterization by Dr. Bluford Habermann on 2007, that showed severe disease in the right coronary artery, in which a 3.5 x 24 mm Taxus stent was placed, with also angioplasty of the LAD placing a 2.75 x 12 mm Taxus stent. On 2011, he had 80% disease in the right coronary artery, with brachytherapy and placement of a 4.0 x 28 mm Promus stent. On 2016, he had 50% in-stent stenosis of the right coronary artery, 50% LAD, 80% mid LAD, and the LAD was stented with a 3.5 x 38 mm Resolute. On 10/02/2018, he had a 4.0 x 22 mm Synergy stent placed in the LAD. On 2019, a catheterization showed critical left main trunk disease, with 75% LAD and ostial circumflex, right coronary artery had 80% disease, EF of 65%. This resulted in an open heart surgery by Dr. Nidia Baig on 2019, with a LIMA to the LAD, a vein graft to the PDA, and a vein graft to the OM branch to the circumflex. He had postoperative atrial fibrillation with RVR, treated with amiodarone. On 2020, he had weakness in his left arm and left lower extremity and a CTA showed occlusion of the distal intradural segment of the right ventricular lobe with a right middle cerebral artery territory stroke with ischemic infarct. It was felt that this was embolic from possible atrial fibrillation and he was placed on Eliquis 5 mg b.i.d.   He was asked to wear an event recorder, which he did not do. On 2020, he decided to continue Eliquis and Plavix even though there was not a clear indication for atrial fibrillation. He has not had a catheterization since his open heart surgery on 2019, by Dr. Zachary Rudolph. He has developed increasing chest pain and shortness of breath, especially over this past two to three months. He was seen in the emergency room on . His chest pain radiating through to his back and down his arms. He has chest pain two to three times a week since being in the emergency room. Sometimes, nitroglycerin helps and sometimes it does not. If his pain is worse, he goes to the emergency room, although his workup thus far has always been negative for an acute myocardial infarction. He has had no syncope or near syncope. He is very discouraged because of his chest pain. CARDIAC RISK FACTORS:  Known CAD:  Positive. PTCA:  Positive. Open Heart Surgery:  Positive. Hypertension:  Positive. Hyperlipidemia:  Positive. Other Family Members:  Positive. Peripheral Vascular Disease:  Negative. Diabetes:  Negative. Smoking:  Negative. MEDICATIONS AT HOME:  He is currently on Norvasc 5 mg once a day, Eliquis 5 mg b.i.d., Lipitor 80 mg daily, Plavix 75 mg every other day, Norco p.r.n., Ativan 1 mg p.r.n., Prilosec 40 mg daily, Zofran 4 mg p.r.n., Inderal 80 mg b.i.d., Effexor 100 mg b.i.d., vitamin D 3000 units daily. PAST MEDICAL AND SURGICAL HISTORY:  1. Cardiac as above. 2.  Right knee surgery on 2013. 3.  Hypertension. 4.  Hyperlipidemia. 5.  Right knee replacement. 6.  Ureteral stones. 7.  Bilateral inguinal hernia repair. 8.  Ventral hernia, painful, which has not repaired. 9.  Cervical disk disease. 10.  CVA on 2020, with mild residual left-sided weakness. 11.  Status post common bile duct dilatation secondary to pancreatitis. FAMILY HISTORY:  Father  of MI at 76.   Brother  of MI at 54.  Sister  of MI at 67. SOCIAL HISTORY:  He is 82years old, . Three sons, one is a  in [employer], another has traumatic stress syndrome with Parkinson's, and one son is an advisor to a company. Mr. Rae Elliott does not smoke or drink alcohol. He walks with a cane. REVIEW OF SYSTEMS:  Cardiac as above. Other systems reviewed including constitutional, eyes, ears, nose and throat, cardiovascular, respiratory, GI, , musculoskeletal, integumentary, neurologic, endocrine, hematologic and allergic/immunologic are negative except for what is described above. No weight loss or weight gain. No change in bowel habits, no blood in stools. No fevers, sweats or chills. PHYSICAL EXAMINATION:  VITAL SIGNS:  His blood pressure was 130/78 with a heart rate of 78 and regular. Respiratory rate 18. O2 saturation 96%. Weight 168 pounds. GENERAL:  He is a pleasant 73-year-old gentleman. Denied pain. He was oriented to person, place and time. Answered questions appropriately. SKIN:  No unusual skin changes. HEENT:  The pupils are equally round and intact. Mucous membranes were dry. NECK:  No JVD. Good carotid pulses. No carotid bruits. No lymphadenopathy or thyromegaly. CARDIOVASCULAR EXAM:  S1 and S2 were normal.  No S3 or S4. Soft systolic blowing type murmur. No diastolic murmur. PMI was normal.  No lift, thrust, or pericardial friction rub. LUNGS:  Quite clear to auscultation and percussion. ABDOMEN:  Soft and nontender. Good bowel sounds. EXTREMITIES:  Good femoral pulses. Good pedal pulses. No pedal edema. Skin was warm and dry. No calf tenderness. Nail beds pink. Good cap refill. PULSES:  Bilateral symmetrical radial, brachial and carotid pulses. No carotid bruits. Good femoral and pedal pulses. NEUROLOGIC EXAM:  Within normal limits. PSYCHIATRIC EXAM:  Within normal limits.     LABORATORY DATA:  His sodium was 136, potassium 4.3, BUN 17, creatinine 1.08, GFR greater than 60. His calcium was 9.3. White count was 8.0, hemoglobin 13.8 with a platelet count of [phone],409. EKG showed sinus rhythm with nonspecific ST changes, no acute changes. IMPRESSION:  1. Persistent recurrent chest pain along with marked shortness of breath, which I believe is secondary to angina. 2.  Severe coronary artery disease. 3.  Catheterization on 05/20/2001, at [facility] with stenting of the proximal and mid right coronary artery with bare-metal stent. 4.  Stenting of the LAD in [facility] in 2004. 5.  Brachytherapy of the LAD in 2004. 6.  Restenosis of the right coronary artery on 05/04/2007, placing a 3.5 x 24 mm Taxus stent in the right coronary artery and a 2.75 x 12 mm Taxus stent in the LAD. 7.  A 4.0 x 28 mm Promus stent in the right coronary artery on 03/09/2011.  8.  Catheterization on 06/29/2016, showed 80% in-stent stenosis of the mid LAD, 50% RCA, unremarkable circumflex, EF of 70%, with stenting of the mid LAD with a 3.5 x 38 mm Resolute stent. 9.  Catheterization by Dr. Radha Olivas on 06/17/2019, showed 50% left main trunk, 75% LAD, 80% RCA, 75% OM, with an EF of 65%. 10.  Open heart surgery on 06/24/2019, by Dr. Geraldo Park, with a LIMA to the LAD, vein graft to the OM, and vein graft to the right coronary artery. 11.  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 along with Plavix 75 mg every other day even though atrial fibrillation had not been documented, with him wanting to continue Eliquis rather than doing event recorder to monitor for atrial fibrillation. PLAN:  1. Increase Norvasc to 5 mg b.i.d.  2.  Call on Monday with an update. 3.  We will do a cardiac catheterization at [facility] as soon as elective surgeries are cleared again. DISCUSSION:  Mr. Rae Elliott has had increasing chest pain and increasing shortness of breath with any exertion.   He has had multiple ER visits and his troponins have been negative. However, his symptoms are compelling for angina. I will not do a stress test as he will need a catheterization with his chest pain and shortness of breath. Elective catheterizations are on hold at this time at [facility], so unfortunately we have to continue medical therapy until they are cleared. I will see him in early February to hopefully schedule a catheterization at that time. If he has more chest pain in the interim, he will go to the emergency room and I would transfer for early cardiac catheterization. Thank you very much for allowing me the privilege of seeing Mr. Marielle Shi. If you have any questions on my thoughts, please do not hesitate to contact me.      Sincerely,        Camelia Newberry    D: 01/12/2022 21:23:01     T: 01/13/2022 1:17:52     [email or web address]  Job#: [id number]   Doc#: [id number]

## 2022-01-12 ENCOUNTER — APPOINTMENT (OUTPATIENT)
Dept: GENERAL RADIOLOGY | Age: 87
End: 2022-01-12
Payer: MEDICARE

## 2022-01-12 ENCOUNTER — HOSPITAL ENCOUNTER (EMERGENCY)
Age: 87
Discharge: LEFT BEFORE DISCHARGE | End: 2022-01-12
Attending: EMERGENCY MEDICINE
Payer: MEDICARE

## 2022-01-12 VITALS
SYSTOLIC BLOOD PRESSURE: 158 MMHG | BODY MASS INDEX: 23.62 KG/M2 | TEMPERATURE: 97.5 F | OXYGEN SATURATION: 98 % | HEART RATE: 70 BPM | DIASTOLIC BLOOD PRESSURE: 132 MMHG | RESPIRATION RATE: 15 BRPM | WEIGHT: 165 LBS | HEIGHT: 70 IN

## 2022-01-12 DIAGNOSIS — R07.9 CHEST PAIN, UNSPECIFIED TYPE: Primary | ICD-10-CM

## 2022-01-12 LAB
ABSOLUTE EOS #: 0.1 K/UL (ref 0–0.4)
ABSOLUTE IMMATURE GRANULOCYTE: ABNORMAL K/UL (ref 0–0.3)
ABSOLUTE LYMPH #: 1 K/UL (ref 1–4.8)
ABSOLUTE MONO #: 0.8 K/UL (ref 0–1)
ANION GAP SERPL CALCULATED.3IONS-SCNC: 11 MMOL/L (ref 9–17)
BASOPHILS # BLD: 0 % (ref 0–2)
BASOPHILS ABSOLUTE: 0 K/UL (ref 0–0.2)
BNP INTERPRETATION: NORMAL
BUN BLDV-MCNC: 17 MG/DL (ref 8–23)
BUN/CREAT BLD: 16 (ref 9–20)
CALCIUM SERPL-MCNC: 9.3 MG/DL (ref 8.6–10.4)
CHLORIDE BLD-SCNC: 102 MMOL/L (ref 98–107)
CO2: 23 MMOL/L (ref 20–31)
CREAT SERPL-MCNC: 1.08 MG/DL (ref 0.7–1.2)
DIFFERENTIAL TYPE: YES
EOSINOPHILS RELATIVE PERCENT: 1 % (ref 0–5)
GFR AFRICAN AMERICAN: >60 ML/MIN
GFR NON-AFRICAN AMERICAN: >60 ML/MIN
GFR SERPL CREATININE-BSD FRML MDRD: ABNORMAL ML/MIN/{1.73_M2}
GFR SERPL CREATININE-BSD FRML MDRD: ABNORMAL ML/MIN/{1.73_M2}
GLUCOSE BLD-MCNC: 107 MG/DL (ref 70–99)
HCT VFR BLD CALC: 41 % (ref 41–53)
HEMOGLOBIN: 13.8 G/DL (ref 13.5–17.5)
IMMATURE GRANULOCYTES: ABNORMAL %
INR BLD: 1.1
LYMPHOCYTES # BLD: 12 % (ref 13–44)
MCH RBC QN AUTO: 27.6 PG (ref 26–34)
MCHC RBC AUTO-ENTMCNC: 33.7 G/DL (ref 31–37)
MCV RBC AUTO: 82 FL (ref 80–100)
MONOCYTES # BLD: 10 % (ref 5–9)
NRBC AUTOMATED: ABNORMAL PER 100 WBC
PARTIAL THROMBOPLASTIN TIME: 31.2 SEC (ref 23.9–33.8)
PDW BLD-RTO: 15.6 % (ref 12.1–15.2)
PLATELET # BLD: 280 K/UL (ref 140–450)
PLATELET ESTIMATE: ABNORMAL
PMV BLD AUTO: ABNORMAL FL (ref 6–12)
POTASSIUM SERPL-SCNC: 4.3 MMOL/L (ref 3.7–5.3)
PRO-BNP: 219 PG/ML
PROTHROMBIN TIME: 14 SEC (ref 11.5–14.2)
RBC # BLD: 5 M/UL (ref 4.5–5.9)
RBC # BLD: ABNORMAL 10*6/UL
SEG NEUTROPHILS: 77 % (ref 39–75)
SEGMENTED NEUTROPHILS ABSOLUTE COUNT: 6.1 K/UL (ref 2.1–6.5)
SODIUM BLD-SCNC: 136 MMOL/L (ref 135–144)
TROPONIN INTERP: ABNORMAL
TROPONIN INTERP: ABNORMAL
TROPONIN T: ABNORMAL NG/ML
TROPONIN T: ABNORMAL NG/ML
TROPONIN, HIGH SENSITIVITY: 28 NG/L (ref 0–22)
TROPONIN, HIGH SENSITIVITY: 28 NG/L (ref 0–22)
WBC # BLD: 8 K/UL (ref 3.5–11)
WBC # BLD: ABNORMAL 10*3/UL

## 2022-01-12 PROCEDURE — 85730 THROMBOPLASTIN TIME PARTIAL: CPT

## 2022-01-12 PROCEDURE — 2580000003 HC RX 258: Performed by: EMERGENCY MEDICINE

## 2022-01-12 PROCEDURE — 96375 TX/PRO/DX INJ NEW DRUG ADDON: CPT

## 2022-01-12 PROCEDURE — 71045 X-RAY EXAM CHEST 1 VIEW: CPT

## 2022-01-12 PROCEDURE — 93005 ELECTROCARDIOGRAM TRACING: CPT | Performed by: EMERGENCY MEDICINE

## 2022-01-12 PROCEDURE — 36415 COLL VENOUS BLD VENIPUNCTURE: CPT

## 2022-01-12 PROCEDURE — 80048 BASIC METABOLIC PNL TOTAL CA: CPT

## 2022-01-12 PROCEDURE — 83880 ASSAY OF NATRIURETIC PEPTIDE: CPT

## 2022-01-12 PROCEDURE — 96374 THER/PROPH/DIAG INJ IV PUSH: CPT

## 2022-01-12 PROCEDURE — 6360000002 HC RX W HCPCS: Performed by: EMERGENCY MEDICINE

## 2022-01-12 PROCEDURE — 99283 EMERGENCY DEPT VISIT LOW MDM: CPT

## 2022-01-12 PROCEDURE — 84484 ASSAY OF TROPONIN QUANT: CPT

## 2022-01-12 PROCEDURE — 85025 COMPLETE CBC W/AUTO DIFF WBC: CPT

## 2022-01-12 PROCEDURE — 85610 PROTHROMBIN TIME: CPT

## 2022-01-12 RX ORDER — KETOROLAC TROMETHAMINE 15 MG/ML
15 INJECTION, SOLUTION INTRAMUSCULAR; INTRAVENOUS ONCE
Status: COMPLETED | OUTPATIENT
Start: 2022-01-12 | End: 2022-01-12

## 2022-01-12 RX ORDER — FENTANYL CITRATE 50 UG/ML
50 INJECTION, SOLUTION INTRAMUSCULAR; INTRAVENOUS ONCE
Status: COMPLETED | OUTPATIENT
Start: 2022-01-12 | End: 2022-01-12

## 2022-01-12 RX ORDER — ONDANSETRON 2 MG/ML
4 INJECTION INTRAMUSCULAR; INTRAVENOUS ONCE
Status: COMPLETED | OUTPATIENT
Start: 2022-01-12 | End: 2022-01-12

## 2022-01-12 RX ORDER — HYDROCODONE BITARTRATE AND ACETAMINOPHEN 5; 325 MG/1; MG/1
1 TABLET ORAL EVERY 8 HOURS PRN
Qty: 12 TABLET | Refills: 0 | Status: SHIPPED | OUTPATIENT
Start: 2022-01-12 | End: 2022-01-16

## 2022-01-12 RX ORDER — 0.9 % SODIUM CHLORIDE 0.9 %
1000 INTRAVENOUS SOLUTION INTRAVENOUS ONCE
Status: COMPLETED | OUTPATIENT
Start: 2022-01-12 | End: 2022-01-12

## 2022-01-12 RX ADMIN — FENTANYL CITRATE 50 MCG: 50 INJECTION, SOLUTION INTRAMUSCULAR; INTRAVENOUS at 15:13

## 2022-01-12 RX ADMIN — KETOROLAC TROMETHAMINE 15 MG: 15 INJECTION, SOLUTION INTRAMUSCULAR; INTRAVENOUS at 15:13

## 2022-01-12 RX ADMIN — ONDANSETRON 4 MG: 2 INJECTION INTRAMUSCULAR; INTRAVENOUS at 15:13

## 2022-01-12 RX ADMIN — SODIUM CHLORIDE 999 ML: 9 INJECTION, SOLUTION INTRAVENOUS at 15:13

## 2022-01-12 ASSESSMENT — PAIN DESCRIPTION - PAIN TYPE: TYPE: ACUTE PAIN

## 2022-01-12 ASSESSMENT — PAIN SCALES - GENERAL: PAINLEVEL_OUTOF10: 10

## 2022-01-12 ASSESSMENT — PAIN DESCRIPTION - LOCATION: LOCATION: BACK;CHEST;ARM

## 2022-01-12 ASSESSMENT — PAIN DESCRIPTION - FREQUENCY: FREQUENCY: CONTINUOUS

## 2022-01-12 ASSESSMENT — PAIN DESCRIPTION - ONSET: ONSET: GRADUAL

## 2022-01-12 ASSESSMENT — PAIN DESCRIPTION - DESCRIPTORS: DESCRIPTORS: CRUSHING

## 2022-01-12 ASSESSMENT — PAIN DESCRIPTION - ORIENTATION: ORIENTATION: MID

## 2022-01-12 ASSESSMENT — PAIN DESCRIPTION - PROGRESSION: CLINICAL_PROGRESSION: RAPIDLY WORSENING

## 2022-01-12 NOTE — ED PROVIDER NOTES
HPI:  1/12/22,   Time: 2:32 PM RIVER Sumner is a 82 y.o. male presenting to the ED for substernal chest pain described as a pressure and an ache, beginning over the last few hours ago. The complaint has been constant, severe in severity, and worsened by nothing. No relief with 2 nitro sublingual and also patient took Imdur as well without relief. No fever chills night sweats or significant shortness of breath patient does have chronic cough    ROS:   Pertinent positives and negatives are stated within HPI, all other systems reviewed and are negative.  --------------------------------------------- PAST HISTORY ---------------------------------------------  Past Medical History:  has a past medical history of Anginal pain ([facility] CAD (coronary artery disease), CVA (cerebral vascular accident) ([facility] CVA (cerebral vascular accident) ([facility] Diverticula of colon, Hyperlipidemia, Hypertension, and Kidney stone. Past Surgical History:  has a past surgical history that includes Cardiac surgery; Stomach surgery; Cardiac catheterization (Left, 12/24/14); Cardiac catheterization (5/4/2007); Cardiac catheterization (5/4/07 con't); Cardiac catheterization (6/12/2008); Percutaneous Transluminal Coronary Angio (3/9/2011); joint replacement (Right, 1998 and 2012 ); joint replacement (Right, 2012); Cholecystectomy; Dilatation, esophagus; Abdomen surgery; colectomy; hernia repair (Right); eye surgery (Bilateral); Appendectomy; Colonoscopy; Colonoscopy (03-); angioplasty (06/29/2016); pr esophagogastroduodenoscopy transoral diagnostic (N/A, 6/14/2017); Upper gastrointestinal endoscopy; and angioplasty (10/02/2018). Social History:  reports that he quit smoking about 54 years ago. He smoked 10.00 packs per day. He has never used smokeless tobacco. He reports that he does not drink alcohol and does not use drugs. Family History: family history includes Heart Attack in his brother and father.      The patients home medications have been reviewed.     Allergies: Pcn [penicillins], Ranolazine, and Penicillamine    -------------------------------------------------- RESULTS -------------------------------------------------  All laboratory and radiology results have been personally reviewed by myself   LABS:  Results for orders placed or performed during the hospital encounter of 19/80/66   Basic Metabolic Panel   Result Value Ref Range    Glucose 107 (H) 70 - 99 mg/dL    BUN 17 8 - 23 mg/dL    CREATININE 1.08 0.70 - 1.20 mg/dL    Bun/Cre Ratio 16 9 - 20    Calcium 9.3 8.6 - 10.4 mg/dL    Sodium 136 135 - 144 mmol/L    Potassium 4.3 3.7 - 5.3 mmol/L    Chloride 102 98 - 107 mmol/L    CO2 23 20 - 31 mmol/L    Anion Gap 11 9 - 17 mmol/L    GFR Non-African American >60 >60 mL/min    GFR African American >60 >60 mL/min    GFR Comment          GFR Staging NOT REPORTED    APTT   Result Value Ref Range    PTT 31.2 23.9 - 33.8 sec   Brain Natriuretic Peptide   Result Value Ref Range    Pro- <300 pg/mL    BNP Interpretation NOT REPORTED    CBC Auto Differential   Result Value Ref Range    WBC 8.0 3.5 - 11.0 k/uL    RBC 5.00 4.5 - 5.9 m/uL    Hemoglobin 13.8 13.5 - 17.5 g/dL    Hematocrit 41.0 41 - 53 %    MCV 82.0 80 - 100 fL    MCH 27.6 26 - 34 pg    MCHC 33.7 31 - 37 g/dL    RDW 15.6 (H) 12.1 - 15.2 %    Platelets 570 734 - 562 k/uL    MPV NOT REPORTED 6.0 - 12.0 fL    NRBC Automated NOT REPORTED per 100 WBC    Differential Type YES     Seg Neutrophils 77 (H) 39 - 75 %    Lymphocytes 12 (L) 13 - 44 %    Monocytes 10 (H) 5 - 9 %    Eosinophils % 1 0 - 5 %    Basophils 0 0 - 2 %    Immature Granulocytes NOT REPORTED 0 %    Segs Absolute 6.10 2.1 - 6.5 k/uL    Absolute Lymph # 1.00 1.0 - 4.8 k/uL    Absolute Mono # 0.80 0.0 - 1.0 k/uL    Absolute Eos # 0.10 0.0 - 0.4 k/uL    Basophils Absolute 0.00 0.0 - 0.2 k/uL    Absolute Immature Granulocyte NOT REPORTED 0.00 - 0.30 k/uL    WBC Morphology NOT REPORTED     RBC Morphology NOT REPORTED     Platelet Estimate NOT REPORTED    Protime-INR   Result Value Ref Range    Protime 14.0 11.5 - 14.2 sec    INR 1.1    Troponin   Result Value Ref Range    Troponin, High Sensitivity 28 (H) 0 - 22 ng/L    Troponin T NOT REPORTED <0.03 ng/mL    Troponin Interp NOT REPORTED    Troponin   Result Value Ref Range    Troponin, High Sensitivity 28 (H) 0 - 22 ng/L    Troponin T NOT REPORTED <0.03 ng/mL    Troponin Interp NOT REPORTED    EKG 12 Lead   Result Value Ref Range    Ventricular Rate 90 BPM    Atrial Rate 90 BPM    P-R Interval 194 ms    QRS Duration 86 ms    Q-T Interval 382 ms    QTc Calculation (Bazett) 467 ms    P Axis 61 degrees    R Axis -30 degrees    T Axis 28 degrees   EKG 12 Lead   Result Value Ref Range    Ventricular Rate 63 BPM    Atrial Rate 63 BPM    P-R Interval 212 ms    QRS Duration 92 ms    Q-T Interval 426 ms    QTc Calculation (Bazett) 435 ms    P Axis 56 degrees    R Axis -19 degrees    T Axis 10 degrees       RADIOLOGY:  Interpreted by Radiologist.  XR CHEST PORTABLE   Final Result      Stable chest.                      ------------------------- NURSING NOTES AND VITALS REVIEWED ---------------------------   The nursing notes within the ED encounter and vital signs as below have been reviewed. BP (!) 158/132   Pulse 70   Temp 97.5 °F (36.4 °C) (Oral)   Resp 15   Ht 5' 10\" (1.778 m)   Wt 165 lb (74.8 kg)   SpO2 98%   BMI 23.68 kg/m²   Oxygen Saturation Interpretation: Normal      ---------------------------------------------------PHYSICAL EXAM--------------------------------------      Constitutional/General: Alert and oriented x3, well appearing, non toxic in mild distress secondary to pain  Head: NC/AT  Eyes: PERRL, EOMI  Mouth: Oropharynx clear, handling secretions, no trismus  Neck: Supple, full ROM, no meningeal signs  Pulmonary: Lungs clear to auscultation bilaterally, no wheezes, rales, or rhonchi.  Not in respiratory distress  Cardiovascular:  Regular rate and rhythm, no murmurs, gallops, or rubs. 2+ distal pulses  Abdomen: Soft, non tender, non distended,   Extremities: Moves all extremities x 4. Warm and well perfused  Skin: warm and dry without rash  Neurologic: GCS 15,  Psych: Normal Affect      ------------------------------ ED COURSE/MEDICAL DECISION MAKING----------------------  Medications   0.9 % sodium chloride bolus (999 mLs IntraVENous New Bag 1/12/22 1513)   ketorolac (TORADOL) injection 15 mg (15 mg IntraVENous Given 1/12/22 1513)   fentaNYL (SUBLIMAZE) injection 50 mcg (50 mcg IntraVENous Given 1/12/22 1513)   ondansetron (ZOFRAN) injection 4 mg (4 mg IntraVENous Given 1/12/22 1513)         Medical Decision Making:    Chest pain rule out ACS    EKG done at 2:10 PM shows normal sinus rhythm with a rate of 90 bpm, left axis deviation, no clear acute ischemic change or ectopy, patient takes Eliquis therefore does not require heparin and does not tolerate aspirin but is on Plavix    Counseling: The emergency provider has spoken with the patient and discussed todays results, in addition to providing specific details for the plan of care and counseling regarding the diagnosis and prognosis. Questions are answered at this time and they are agreeable with the plan.      --------------------------------- IMPRESSION AND DISPOSITION ---------------------------------    IMPRESSION  1. Chest pain, unspecified type Stable       DISPOSITION  Disposition: Discharge to home  Patient condition is stable    Spoke with cardiologist at [facility] who accepted the patient but no beds available and also spoke with a physician at [facility] who accepted the patient but unfortunately no beds available.   Patient states because no beds are available he wants to go home he understands the risk of missed heart attack or unstable angina but is willing to take that risk states he may drive to a nearby hospital that provides heart catheterizations as part of the service    Critical care time 33 minutes          Con Peterson MD  01/12/22 [id number]

## 2022-01-13 LAB
EKG ATRIAL RATE: 63 BPM
EKG ATRIAL RATE: 90 BPM
EKG P AXIS: 56 DEGREES
EKG P AXIS: 61 DEGREES
EKG P-R INTERVAL: 194 MS
EKG P-R INTERVAL: 212 MS
EKG Q-T INTERVAL: 382 MS
EKG Q-T INTERVAL: 426 MS
EKG QRS DURATION: 86 MS
EKG QRS DURATION: 92 MS
EKG QTC CALCULATION (BAZETT): 435 MS
EKG QTC CALCULATION (BAZETT): 467 MS
EKG R AXIS: -19 DEGREES
EKG R AXIS: -30 DEGREES
EKG T AXIS: 10 DEGREES
EKG T AXIS: 28 DEGREES
EKG VENTRICULAR RATE: 63 BPM
EKG VENTRICULAR RATE: 90 BPM

## 2022-01-13 PROCEDURE — 93010 ELECTROCARDIOGRAM REPORT: CPT | Performed by: INTERNAL MEDICINE

## 2022-01-13 NOTE — PROGRESS NOTES
Zac Nicolas M.D. [address] [place] [address] [address], [place] [address]  [phone]        2022        Lyn Payan MD  [address] [facility], [address] [facility]    RE:   Shanice Vann  :  1935    Dear Dr. Digna Willard:    CHIEF COMPLAINT:  1. Chest pain and shortness of breath consistent with angina. 2.  Severe coronary artery disease. HISTORY OF PRESENT ILLNESS:  I had the pleasure of seeing Mr. Kurtis Dunham in our office on 2022. He is a pleasant 30-year-old gentleman with extensive cardiac history. He had a catheterization by Dr. Nevaeh Mahmood on 2007, that showed severe disease in the right coronary artery, in which a 3.5 x 24 mm Taxus stent was placed, with also angioplasty of the LAD placing a 2.75 x 12 mm Taxus stent. On 2011, he had 80% disease in the right coronary artery, with brachytherapy and placement of a 4.0 x 28 mm Promus stent. On 2016, he had 50% in-stent stenosis of the right coronary artery, 50% LAD, 80% mid LAD, and the LAD was stented with a 3.5 x 38 mm Resolute. On 10/02/2018, he had a 4.0 x 22 mm Synergy stent placed in the LAD. On 2019, a catheterization showed critical left main trunk disease, with 75% LAD and ostial circumflex, right coronary artery had 80% disease, EF of 65%. This resulted in an open heart surgery by Dr. Dominguez Ponce on 2019, with a LIMA to the LAD, a vein graft to the PDA, and a vein graft to the OM branch to the circumflex. He had postoperative atrial fibrillation with RVR, treated with amiodarone. On 2020, he had weakness in his left arm and left lower extremity and a CTA showed occlusion of the distal intradural segment of the right ventricular lobe with a right middle cerebral artery territory stroke with ischemic infarct. It was felt that this was embolic from possible atrial fibrillation and he was placed on Eliquis 5 mg b.i.d.   He was asked to wear an event recorder, which he did not do. On 2020, he decided to continue Eliquis and Plavix even though there was not a clear indication for atrial fibrillation. He has not had a catheterization since his open heart surgery on 2019, by Dr. Sofi Anderson. He has developed increasing chest pain and shortness of breath, especially over this past two to three months. He was seen in the emergency room on . His chest pain radiating through to his back and down his arms. He has chest pain two to three times a week since being in the emergency room. Sometimes, nitroglycerin helps and sometimes it does not. If his pain is worse, he goes to the emergency room, although his workup thus far has always been negative for an acute myocardial infarction. He has had no syncope or near syncope. He is very discouraged because of his chest pain. CARDIAC RISK FACTORS:  Known CAD:  Positive. PTCA:  Positive. Open Heart Surgery:  Positive. Hypertension:  Positive. Hyperlipidemia:  Positive. Other Family Members:  Positive. Peripheral Vascular Disease:  Negative. Diabetes:  Negative. Smoking:  Negative. MEDICATIONS AT HOME:  He is currently on Norvasc 5 mg once a day, Eliquis 5 mg b.i.d., Lipitor 80 mg daily, Plavix 75 mg every other day, Norco p.r.n., Ativan 1 mg p.r.n., Prilosec 40 mg daily, Zofran 4 mg p.r.n., Inderal 80 mg b.i.d., Effexor 100 mg b.i.d., vitamin D 3000 units daily. PAST MEDICAL AND SURGICAL HISTORY:  1. Cardiac as above. 2.  Right knee surgery on 2013. 3.  Hypertension. 4.  Hyperlipidemia. 5.  Right knee replacement. 6.  Ureteral stones. 7.  Bilateral inguinal hernia repair. 8.  Ventral hernia, painful, which has not repaired. 9.  Cervical disk disease. 10.  CVA on 2020, with mild residual left-sided weakness. 11.  Status post common bile duct dilatation secondary to pancreatitis. FAMILY HISTORY:  Father  of MI at 76.   Brother  of MI at 54.  Sister  of MI at 67. SOCIAL HISTORY:  He is 82years old, . Three sons, one is a  in [place], another has traumatic stress syndrome with Parkinson's, and one son is an advisor to a company. Mr. Neal Calhoun does not smoke or drink alcohol. He walks with a cane. REVIEW OF SYSTEMS:  Cardiac as above. Other systems reviewed including constitutional, eyes, ears, nose and throat, cardiovascular, respiratory, GI, , musculoskeletal, integumentary, neurologic, endocrine, hematologic and allergic/immunologic are negative except for what is described above. No weight loss or weight gain. No change in bowel habits, no blood in stools. No fevers, sweats or chills. PHYSICAL EXAMINATION:  VITAL SIGNS:  His blood pressure was 130/78 with a heart rate of 78 and regular. Respiratory rate 18. O2 saturation 96%. Weight 168 pounds. GENERAL:  He is a pleasant 82-year-old gentleman. Denied pain. He was oriented to person, place and time. Answered questions appropriately. SKIN:  No unusual skin changes. HEENT:  The pupils are equally round and intact. Mucous membranes were dry. NECK:  No JVD. Good carotid pulses. No carotid bruits. No lymphadenopathy or thyromegaly. CARDIOVASCULAR EXAM:  S1 and S2 were normal.  No S3 or S4. Soft systolic blowing type murmur. No diastolic murmur. PMI was normal.  No lift, thrust, or pericardial friction rub. LUNGS:  Quite clear to auscultation and percussion. ABDOMEN:  Soft and nontender. Good bowel sounds. EXTREMITIES:  Good femoral pulses. Good pedal pulses. No pedal edema. Skin was warm and dry. No calf tenderness. Nail beds pink. Good cap refill. PULSES:  Bilateral symmetrical radial, brachial and carotid pulses. No carotid bruits. Good femoral and pedal pulses. NEUROLOGIC EXAM:  Within normal limits. PSYCHIATRIC EXAM:  Within normal limits.     LABORATORY DATA:  His sodium was 136, potassium 4.3, BUN 17, creatinine 1.08, GFR greater than 60. His calcium was 9.3. White count was 8.0, hemoglobin 13.8 with a platelet count of 257,269. EKG showed sinus rhythm with nonspecific ST changes, no acute changes. IMPRESSION:  1. Persistent recurrent chest pain along with marked shortness of breath, which I believe is secondary to angina. 2.  Severe coronary artery disease. 3.  Catheterization on 05/20/2001, at [facility] with stenting of the proximal and mid right coronary artery with bare-metal stent. 4.  Stenting of the LAD in [place] in 2004. 5.  Brachytherapy of the LAD in 2004. 6.  Restenosis of the right coronary artery on 05/04/2007, placing a 3.5 x 24 mm Taxus stent in the right coronary artery and a 2.75 x 12 mm Taxus stent in the LAD. 7.  A 4.0 x 28 mm Promus stent in the right coronary artery on 03/09/2011.  8.  Catheterization on 06/29/2016, showed 80% in-stent stenosis of the mid LAD, 50% RCA, unremarkable circumflex, EF of 70%, with stenting of the mid LAD with a 3.5 x 38 mm Resolute stent. 9.  Catheterization by Dr. Bluford Habermann on 06/17/2019, showed 50% left main trunk, 75% LAD, 80% RCA, 75% OM, with an EF of 65%. 10.  Open heart surgery on 06/24/2019, by Dr. Nidia Baig, with a LIMA to the LAD, vein graft to the OM, and vein graft to the right coronary artery. 11.  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 along with Plavix 75 mg every other day even though atrial fibrillation had not been documented, with him wanting to continue Eliquis rather than doing event recorder to monitor for atrial fibrillation. PLAN:  1. Increase Norvasc to 5 mg b.i.d.  2.  Call on Monday with an update. 3.  We will do a cardiac catheterization at [facility] as soon as elective surgeries are cleared again. DISCUSSION:  Mr. Jose Gabriel has had increasing chest pain and increasing shortness of breath with any exertion.   He has had multiple ER visits and his troponins have been negative. However, his symptoms are compelling for angina. I will not do a stress test as he will need a catheterization with his chest pain and shortness of breath. Elective catheterizations are on hold at this time at [facility], so unfortunately we have to continue medical therapy until they are cleared. I will see him in early February to hopefully schedule a catheterization at that time. If he has more chest pain in the interim, he will go to the emergency room and I would transfer for early cardiac catheterization. Thank you very much for allowing me the privilege of seeing Mr. Dewey Nuno. If you have any questions on my thoughts, please do not hesitate to contact me.      Sincerely,        Magdalena Riley    D: 01/12/2022 21:23:01     T: 01/13/2022 1:17:52     [email or web address]  Job#: [id number]   Doc#: [id number]

## 2022-01-25 ENCOUNTER — OFFICE VISIT (OUTPATIENT)
Dept: CARDIOLOGY CLINIC | Age: 87
End: 2022-01-25
Payer: MEDICARE

## 2022-01-25 VITALS
HEART RATE: 56 BPM | OXYGEN SATURATION: 99 % | DIASTOLIC BLOOD PRESSURE: 88 MMHG | BODY MASS INDEX: 24.11 KG/M2 | SYSTOLIC BLOOD PRESSURE: 140 MMHG | WEIGHT: 168 LBS

## 2022-01-25 DIAGNOSIS — E55.9 VITAMIN D DEFICIENCY DISEASE: ICD-10-CM

## 2022-01-25 DIAGNOSIS — I10 HYPERTENSION, UNSPECIFIED TYPE: ICD-10-CM

## 2022-01-25 DIAGNOSIS — I48.91 ATRIAL FIBRILLATION, UNSPECIFIED TYPE (HCC): ICD-10-CM

## 2022-01-25 DIAGNOSIS — R07.9 CHEST PAIN, UNSPECIFIED TYPE: Primary | ICD-10-CM

## 2022-01-25 PROCEDURE — 1036F TOBACCO NON-USER: CPT | Performed by: INTERNAL MEDICINE

## 2022-01-25 PROCEDURE — 4040F PNEUMOC VAC/ADMIN/RCVD: CPT | Performed by: INTERNAL MEDICINE

## 2022-01-25 PROCEDURE — 1123F ACP DISCUSS/DSCN MKR DOCD: CPT | Performed by: INTERNAL MEDICINE

## 2022-01-25 PROCEDURE — G8484 FLU IMMUNIZE NO ADMIN: HCPCS | Performed by: INTERNAL MEDICINE

## 2022-01-25 PROCEDURE — G8420 CALC BMI NORM PARAMETERS: HCPCS | Performed by: INTERNAL MEDICINE

## 2022-01-25 PROCEDURE — G8427 DOCREV CUR MEDS BY ELIG CLIN: HCPCS | Performed by: INTERNAL MEDICINE

## 2022-01-25 PROCEDURE — 99214 OFFICE O/P EST MOD 30 MIN: CPT | Performed by: INTERNAL MEDICINE

## 2022-01-25 NOTE — LETTER
Alida Razo M.D. [address] [place] [address] Marie Frank   [phone]        2022        Angie Burger MD  [address] [facility], [address] [facility]    RE:   Edelmira Ambrose  :  1935    Dear Dr. Romano Covert:    I met with Mr. Jose Gabriel on 2022. I trust you received my full H and P from 2022. At that time, he was having chest pain and I increased his Norvasc to 5 mg b.i.d. He has continued to, however, have chest pain and chest discomfort. He can do very little activity where he develops his discomfort. He was seen in the emergency room again on  and at that time, I recommended that he be sent to [facility] for a catheterization. However, there were no beds available and Mr. Jose Gabriel decided to go home. I have left his medications unchanged. We will, however, proceed with a cardiac catheterization on 2022. I did discuss with him his catheterization and told him that we may or may not find any abnormality. If there is no significant disease present, it will not tell us why he is having his chest pain; however, it will rule out cardiac as his source of chest discomfort. He understands and wishes to proceed with a catheterization. Thank you very much for allowing me the privilege of seeing Mr. Jose Gabriel. If you have any questions on my thoughts, please do not hesitate to contact me.      Sincerely,        Matti Campo    D: 2022 23:41:03     T: 2022 23:43:31     [email or web address]  Job#: [id number]   Doc#: [id number]

## 2022-02-08 ENCOUNTER — HOSPITAL ENCOUNTER (OUTPATIENT)
Age: 87
Discharge: HOME | End: 2022-02-08
Payer: MEDICARE

## 2022-02-08 DIAGNOSIS — R07.9 CHEST PAIN, UNSPECIFIED TYPE: ICD-10-CM

## 2022-02-08 DIAGNOSIS — E55.9 VITAMIN D DEFICIENCY DISEASE: ICD-10-CM

## 2022-02-08 DIAGNOSIS — I48.91 ATRIAL FIBRILLATION, UNSPECIFIED TYPE (HCC): ICD-10-CM

## 2022-02-08 DIAGNOSIS — I10 HYPERTENSION, UNSPECIFIED TYPE: ICD-10-CM

## 2022-02-08 LAB
ABSOLUTE EOS #: 0.1 K/UL (ref 0–0.4)
ABSOLUTE IMMATURE GRANULOCYTE: ABNORMAL K/UL (ref 0–0.3)
ABSOLUTE LYMPH #: 1.2 K/UL (ref 1–4.8)
ABSOLUTE MONO #: 0.6 K/UL (ref 0–1)
ALBUMIN SERPL-MCNC: 4 G/DL (ref 3.5–5.2)
ALBUMIN/GLOBULIN RATIO: ABNORMAL (ref 1–2.5)
ALP BLD-CCNC: 73 U/L (ref 40–129)
ALT SERPL-CCNC: 14 U/L (ref 5–41)
ANION GAP SERPL CALCULATED.3IONS-SCNC: 13 MMOL/L (ref 9–17)
AST SERPL-CCNC: 16 U/L
BASOPHILS # BLD: 0 % (ref 0–2)
BASOPHILS ABSOLUTE: 0 K/UL (ref 0–0.2)
BILIRUB SERPL-MCNC: 0.61 MG/DL (ref 0.3–1.2)
BUN BLDV-MCNC: 18 MG/DL (ref 8–23)
BUN/CREAT BLD: 16 (ref 9–20)
CALCIUM SERPL-MCNC: 9.2 MG/DL (ref 8.6–10.4)
CHLORIDE BLD-SCNC: 103 MMOL/L (ref 98–107)
CHOLESTEROL/HDL RATIO: 5.4
CHOLESTEROL: 217 MG/DL
CO2: 20 MMOL/L (ref 20–31)
CREAT SERPL-MCNC: 1.1 MG/DL (ref 0.7–1.2)
DIFFERENTIAL TYPE: YES
EOSINOPHILS RELATIVE PERCENT: 2 % (ref 0–5)
GFR AFRICAN AMERICAN: >60 ML/MIN
GFR NON-AFRICAN AMERICAN: >60 ML/MIN
GFR SERPL CREATININE-BSD FRML MDRD: ABNORMAL ML/MIN/{1.73_M2}
GFR SERPL CREATININE-BSD FRML MDRD: ABNORMAL ML/MIN/{1.73_M2}
GLUCOSE BLD-MCNC: 184 MG/DL (ref 70–99)
HCT VFR BLD CALC: 42 % (ref 41–53)
HDLC SERPL-MCNC: 40 MG/DL
HEMOGLOBIN: 14.3 G/DL (ref 13.5–17.5)
IMMATURE GRANULOCYTES: ABNORMAL %
LDL CHOLESTEROL: 151 MG/DL (ref 0–130)
LYMPHOCYTES # BLD: 25 % (ref 13–44)
MCH RBC QN AUTO: 27.8 PG (ref 26–34)
MCHC RBC AUTO-ENTMCNC: 34.1 G/DL (ref 31–37)
MCV RBC AUTO: 81.4 FL (ref 80–100)
MONOCYTES # BLD: 11 % (ref 5–9)
NRBC AUTOMATED: ABNORMAL PER 100 WBC
PATIENT FASTING?: YES
PDW BLD-RTO: 15.2 % (ref 12.1–15.2)
PLATELET # BLD: 245 K/UL (ref 140–450)
PLATELET ESTIMATE: ABNORMAL
PMV BLD AUTO: ABNORMAL FL (ref 6–12)
POTASSIUM SERPL-SCNC: 3.9 MMOL/L (ref 3.7–5.3)
RBC # BLD: 5.15 M/UL (ref 4.5–5.9)
RBC # BLD: ABNORMAL 10*6/UL
SEG NEUTROPHILS: 62 % (ref 39–75)
SEGMENTED NEUTROPHILS ABSOLUTE COUNT: 3 K/UL (ref 2.1–6.5)
SODIUM BLD-SCNC: 136 MMOL/L (ref 135–144)
TOTAL PROTEIN: 6.9 G/DL (ref 6.4–8.3)
TRIGL SERPL-MCNC: 132 MG/DL
VLDLC SERPL CALC-MCNC: ABNORMAL MG/DL (ref 1–30)
WBC # BLD: 4.9 K/UL (ref 3.5–11)
WBC # BLD: ABNORMAL 10*3/UL

## 2022-02-08 PROCEDURE — 80061 LIPID PANEL: CPT

## 2022-02-08 PROCEDURE — 85025 COMPLETE CBC W/AUTO DIFF WBC: CPT

## 2022-02-08 PROCEDURE — 36415 COLL VENOUS BLD VENIPUNCTURE: CPT

## 2022-02-08 PROCEDURE — 80053 COMPREHEN METABOLIC PANEL: CPT

## 2022-02-21 ENCOUNTER — TELEPHONE (OUTPATIENT)
Dept: CARDIOLOGY CLINIC | Age: 87
End: 2022-02-21

## 2022-02-22 DIAGNOSIS — R07.9 CHEST PAIN, UNSPECIFIED TYPE: ICD-10-CM

## 2022-02-22 DIAGNOSIS — I10 HYPERTENSION, UNSPECIFIED TYPE: ICD-10-CM

## 2022-02-22 DIAGNOSIS — I48.91 ATRIAL FIBRILLATION, UNSPECIFIED TYPE (HCC): ICD-10-CM

## 2022-02-22 DIAGNOSIS — E55.9 VITAMIN D DEFICIENCY DISEASE: ICD-10-CM

## 2022-04-01 ENCOUNTER — HOSPITAL ENCOUNTER (EMERGENCY)
Age: 87
Discharge: HOME | End: 2022-04-01
Attending: EMERGENCY MEDICINE
Payer: MEDICARE

## 2022-04-01 VITALS
SYSTOLIC BLOOD PRESSURE: 121 MMHG | HEART RATE: 109 BPM | RESPIRATION RATE: 16 BRPM | OXYGEN SATURATION: 98 % | TEMPERATURE: 98.3 F | BODY MASS INDEX: 22.96 KG/M2 | WEIGHT: 160 LBS | DIASTOLIC BLOOD PRESSURE: 90 MMHG

## 2022-04-01 DIAGNOSIS — R07.9 CHEST PAIN, UNSPECIFIED TYPE: Primary | ICD-10-CM

## 2022-04-01 LAB
ABSOLUTE EOS #: 0.2 K/UL (ref 0–0.4)
ABSOLUTE LYMPH #: 1.3 K/UL (ref 1–4.8)
ABSOLUTE MONO #: 0.7 K/UL (ref 0–1)
ALBUMIN SERPL-MCNC: 4.2 G/DL (ref 3.5–5.2)
ALP BLD-CCNC: 86 U/L (ref 40–129)
ALT SERPL-CCNC: 16 U/L (ref 5–41)
ANION GAP SERPL CALCULATED.3IONS-SCNC: 14 MMOL/L (ref 9–17)
AST SERPL-CCNC: 17 U/L
BASOPHILS # BLD: 0 % (ref 0–2)
BASOPHILS ABSOLUTE: 0 K/UL (ref 0–0.2)
BILIRUB SERPL-MCNC: 0.31 MG/DL (ref 0.3–1.2)
BUN BLDV-MCNC: 21 MG/DL (ref 8–23)
BUN/CREAT BLD: 16 (ref 9–20)
CALCIUM SERPL-MCNC: 9.5 MG/DL (ref 8.6–10.4)
CHLORIDE BLD-SCNC: 104 MMOL/L (ref 98–107)
CO2: 19 MMOL/L (ref 20–31)
CREAT SERPL-MCNC: 1.35 MG/DL (ref 0.7–1.2)
DIFFERENTIAL TYPE: YES
EOSINOPHILS RELATIVE PERCENT: 3 % (ref 0–5)
GFR AFRICAN AMERICAN: >60 ML/MIN
GFR NON-AFRICAN AMERICAN: 50 ML/MIN
GFR SERPL CREATININE-BSD FRML MDRD: ABNORMAL ML/MIN/{1.73_M2}
GLUCOSE BLD-MCNC: 200 MG/DL (ref 70–99)
HCT VFR BLD CALC: 44.1 % (ref 41–53)
HEMOGLOBIN: 14.8 G/DL (ref 13.5–17.5)
LYMPHOCYTES # BLD: 23 % (ref 13–44)
MCH RBC QN AUTO: 27.4 PG (ref 26–34)
MCHC RBC AUTO-ENTMCNC: 33.6 G/DL (ref 31–37)
MCV RBC AUTO: 81.5 FL (ref 80–100)
MONOCYTES # BLD: 12 % (ref 5–9)
PDW BLD-RTO: 15.1 % (ref 12.1–15.2)
PLATELET # BLD: 266 K/UL (ref 140–450)
POTASSIUM SERPL-SCNC: 4.1 MMOL/L (ref 3.7–5.3)
RBC # BLD: 5.4 M/UL (ref 4.5–5.9)
SEG NEUTROPHILS: 62 % (ref 39–75)
SEGMENTED NEUTROPHILS ABSOLUTE COUNT: 3.4 K/UL (ref 2.1–6.5)
SODIUM BLD-SCNC: 137 MMOL/L (ref 135–144)
TOTAL PROTEIN: 6.9 G/DL (ref 6.4–8.3)
TROPONIN, HIGH SENSITIVITY: 20 NG/L (ref 0–22)
WBC # BLD: 5.7 K/UL (ref 3.5–11)

## 2022-04-01 PROCEDURE — 85025 COMPLETE CBC W/AUTO DIFF WBC: CPT

## 2022-04-01 PROCEDURE — 80053 COMPREHEN METABOLIC PANEL: CPT

## 2022-04-01 PROCEDURE — 99283 EMERGENCY DEPT VISIT LOW MDM: CPT

## 2022-04-01 PROCEDURE — 84484 ASSAY OF TROPONIN QUANT: CPT

## 2022-04-01 PROCEDURE — 93005 ELECTROCARDIOGRAM TRACING: CPT | Performed by: EMERGENCY MEDICINE

## 2022-04-01 ASSESSMENT — ENCOUNTER SYMPTOMS
VOMITING: 0
NAUSEA: 0
EYE PAIN: 0
COUGH: 0
SHORTNESS OF BREATH: 1
EYE REDNESS: 0
ABDOMINAL PAIN: 0
COLOR CHANGE: 0
BACK PAIN: 0
TROUBLE SWALLOWING: 0
DIARRHEA: 0
SORE THROAT: 0

## 2022-04-01 ASSESSMENT — PAIN DESCRIPTION - LOCATION: LOCATION: CHEST

## 2022-04-01 ASSESSMENT — PAIN DESCRIPTION - DESCRIPTORS: DESCRIPTORS: PRESSURE

## 2022-04-01 ASSESSMENT — PAIN DESCRIPTION - ONSET: ONSET: ON-GOING

## 2022-04-01 ASSESSMENT — PAIN DESCRIPTION - ORIENTATION: ORIENTATION: MID

## 2022-04-01 ASSESSMENT — PAIN DESCRIPTION - FREQUENCY: FREQUENCY: CONTINUOUS

## 2022-04-01 ASSESSMENT — PAIN DESCRIPTION - PAIN TYPE: TYPE: ACUTE PAIN

## 2022-04-01 ASSESSMENT — PAIN SCALES - GENERAL: PAINLEVEL_OUTOF10: 7

## 2022-04-01 NOTE — ED PROVIDER NOTES
[facility] ED  eMERGENCY dEPARTMENT eNCOUnter      Pt Name: Aristeo Clemons  MRN: [id number]  [place] 1935  Date of evaluation: 4/1/2022  Provider: Robb Landa MD    CHIEF COMPLAINT       Chief Complaint   Patient presents with    Chest Pain     Pt C/O chest pain and SOB x 20 min. Patient is an 19-year-old male who presents to the emergency department complaining of chest pain and shortness of breath. Patient has a very extensive history of coronary artery disease and has had multiple stents. He had a CABG following all of those stents in 2019 and had been doing well and then recently began to have intermittent chest pain again. He had a catheterization per the patient about a month ago and this showed no acute disease. Patient is continued on his meds but today again noted some chest pain with shortness of breath. He denies any fever or chills. He states the pain is a pressure type pain which is typical of his pain        Nursing Notes were reviewed. REVIEW OF SYSTEMS    (2-9 systems for level 4, 10 or more for level 5)     Review of Systems   Constitutional: Negative for chills and fever. HENT: Negative for ear pain, sore throat and trouble swallowing. Eyes: Negative for pain and redness. Respiratory: Positive for shortness of breath. Negative for cough. Cardiovascular: Positive for chest pain. Negative for palpitations. Gastrointestinal: Negative for abdominal pain, diarrhea, nausea and vomiting. Genitourinary: Negative for dysuria and frequency. Musculoskeletal: Negative for back pain and neck pain. Skin: Negative for color change and rash. Neurological: Negative for dizziness, syncope and headaches. Psychiatric/Behavioral: Negative for hallucinations and suicidal ideas. Except as noted above the remainder of the review of systems was reviewed and negative.        PAST MEDICAL HISTORY     Past Medical History:   Diagnosis Date    Anginal pain ([address]     CAD (coronary artery disease)     [facility]A [facility]cerebral vascular [facility] 05/11/2020    CVA (cerebral vascular accident) ([facility] [address] [facility] 05/11/2020    Diverticula of colon     Hyperlipidemia     Hypertension     Kidney stone          SURGICAL HISTORY       Past Surgical History:   Procedure Laterality Date    ABDOMEN SURGERY      partial gastrectomy    ANGIOPLASTY  06/29/2016    Dr. Tereso De La Cruz @ [facility] 32  10/02/2018    Dr. Efren Ramirez @ [facility] 19-- Stent x 1    APPENDECTOMY      CARDIAC CATHETERIZATION Left 12/24/2014    Dr. Bryan Hale -2 vessel CAD-    CARDIAC CATHETERIZATION  05/04/2007    Dr Efren Ramirez: 1. Normal left ventricular systolic function with an estimated ejection fraction of 70%. 2. Elevated left ventricular end-diastolic pressure following angiographic dye load. 3. Double-vessel coronary artery disease with angiographic edivence of restenosis within the intervened-upon segment in the right coronary artery and moderately severe disease just proximal     CARDIAC CATHETERIZATION  5/4/07 con't    to the stented segment in the mid left anterior descending.  CARDIAC CATHETERIZATION  06/12/2008    Dr Blanco: 1. Moderate coronary artery disease, as described, with patent stents with evidence of mild at most in-stent restenosis of the right coronary artery. 2. Normal left ventricular systolic function.     CARDIAC CATHETERIZATION Left 02/18/2022    Dr. Tereso De La Cruz @ [facility]--Medical therapy    CARDIAC SURGERY      13 stents, aorta and CAD    CHOLECYSTECTOMY      COLECTOMY      COLONOSCOPY      COLONOSCOPY  03/20/2015    DILATATION, ESOPHAGUS      EYE SURGERY Bilateral     cataracts    HERNIA REPAIR Right     inguinal    JOINT REPLACEMENT Right 1998 and 2012     knee (3rd surgery)    JOINT REPLACEMENT Right 2012    hip    [place] ESOPHAGOGASTRODUODENOSCOPY TRANSORAL DIAGNOSTIC N/A 06/14/2017    EGD ESOPHAGOGASTRODUODENOSCOPY; photos; bx's performed by Everton Brand MD at [address] [place] [address] PTCA 2011    Dr Monica Hercules. Hyperdynamic left ventricular systolic function, estimated ejection fraction of 70%. 2. Normal left ventricular end-diastolic pressure. 3. Double-vessel coronary artery disease with angiographic disease progression involving the stented segment in the mid right coronary artery consistent with angiographic evidence of restenosis.  STOMACH SURGERY      ulcer, gastric bypass due to \"bleeding ulcer\", partial colectomy due to blockage    UPPER GASTROINTESTINAL ENDOSCOPY           ALLERGIES     Pcn [penicillins], Ranolazine, and Penicillamine    FAMILY HISTORY       Family History   Problem Relation Age of Onset    Heart Attack Father     Heart Attack Brother           SOCIAL HISTORY       Social History     Socioeconomic History    Marital status:      Spouse name: Re Stanley Number of children: 3    Years of education: None    Highest education level: None   Occupational History    None   Tobacco Use    Smoking status: Former Smoker     Packs/day: 10.00     Quit date: 3/20/1967     Years since quittin.0    Smokeless tobacco: Never Used   Vaping Use    Vaping Use: Never used   Substance and Sexual Activity    Alcohol use: No    Drug use: No    Sexual activity: None   Other Topics Concern    None   Social History Narrative    None     Social Determinants of Health     Financial Resource Strain: Low Risk     Difficulty of Paying Living Expenses: Not hard at all   Food Insecurity: No Food Insecurity    Worried About Running Out of Food in the Last Year: Never true    Mague of Food in the Last Year: Never true   Transportation Needs:     Lack of Transportation (Medical): Not on file    Lack of Transportation (Non-Medical):  Not on file   Physical Activity:     Days of Exercise per Week: Not on file    Minutes of Exercise per Session: Not on file   Stress:     Feeling of Stress : Not on file   Social Connections:     Frequency of Communication with Friends and Family: Not on file    Frequency of Social Gatherings with Friends and Family: Not on file    Attends Restorationist Services: Not on file    Active Member of Clubs or Organizations: Not on file    Attends Club or Organization Meetings: Not on file    Marital Status: Not on file   Intimate Partner Violence:     Fear of Current or Ex-Partner: Not on file    Emotionally Abused: Not on file    Physically Abused: Not on file    Sexually Abused: Not on file   Housing Stability:     Unable to Pay for Housing in the Last Year: Not on file    Number of [place] in the Last Year: Not on file    Unstable Housing in the Last Year: Not on file           PHYSICAL EXAM    (up to 7 for level 4, 8 ormore for level 5)     ED Triage Vitals [04/01/22 1143]   BP Temp Temp Source Pulse Resp SpO2 Height Weight   (!) 121/90 98.3 °F (36.8 °C) Oral 109 16 98 % -- 160 lb (72.6 kg)       Physical Exam     Physical    Vital signs and nursing notes were reviewed as well as the social, family, and past medical history. Gen. appearance: Patient is alert and oriented and in no acute distress    Head: Atraumatic, normocephalic    Neck: Supple, trachea/thyroid normal    EENT: PERRLA, EOMI, conjunctiva normal.    Skin: Warm and dry with no rash    Cardiovascular: Heart RRR, no gallops or rubs, no aortic enlargement or bruits noted. Respiratory: Lungs clear, no wheezing, no rales, normal breath sounds. Gastrointestinal: Abdomen nontender, bowel sounds normal, no rebound/guarding/distention or mass    Musculoskeletal: No tenderness in the extremities, no back or hip pain. Neurological: Patient is alert and oriented ×3, no focal motor or sensory deficits noted      DIAGNOSTIC RESULTS     EKG: All EKG's are interpreted by the Emergency Department Physicianwho either signs or Co-signs this chart in the absence of a cardiologist.    Normal sinus rhythm with a rate of 98 and no acute ischemic changes noted.     RADIOLOGY: LABS:  Labs Reviewed   CBC WITH AUTO DIFFERENTIAL - Abnormal; Notable for the following components:       Result Value    Monocytes 12 (*)     All other components within normal limits   COMPREHENSIVE METABOLIC PANEL - Abnormal; Notable for the following components:    Glucose 200 (*)     CREATININE 1.35 (*)     CO2 19 (*)     GFR Non- 50 (*)     All other components within normal limits   TROPONIN       All other labs were within normal range or not returned as of this dictation. EMERGENCY DEPARTMENT COURSE and DIFFERENTIAL DIAGNOSIS/MDM:   Vitals:    Vitals:    04/01/22 1143   BP: (!) 121/90   Pulse: 109   Resp: 16   Temp: 98.3 °F (36.8 °C)   TempSrc: Oral   SpO2: 98%   Weight: 160 lb (72.6 kg)                 REASSESSMENT        Here in the ED patient's troponin came back lower than previous. Patient's EKG was unremarkable. I discussed with Dr. Ko Collins is cardiologist who states that he has chronic angina but given his recent negative cath and normal troponin today he feels that patient can be discharged home. He would like him to increase his Norvasc to 3 times daily from twice daily and he will follow-up on Monday or Tuesday. He states there is nothing else to be done with admission as patient is medical management only at this time. PROCEDURES:  Unless otherwise noted below, none     Procedures    FINAL IMPRESSION      1.  Chest pain, unspecified type          DISPOSITION/PLAN   DISPOSITION Decision To Discharge 04/01/2022 01:10:00 PM      PATIENT REFERRED TO:  Beatriz Ely MD  [facility] [address] [id number]  [phone]    In 2 days        DISCHARGE MEDICATIONS:  New Prescriptions    No medications on file          (Please note that portions ofthis note were completed with a voice recognition program.  Efforts were made to edit the dictations but occasionally words are mis-transcribed.)    Jose F Joya MD(electronically signed)  Attending Emergency Physician Mony Louie MD  04/01/22 [id number]

## 2022-04-02 LAB
EKG ATRIAL RATE: 98 BPM
EKG P AXIS: 36 DEGREES
EKG P-R INTERVAL: 176 MS
EKG Q-T INTERVAL: 340 MS
EKG QRS DURATION: 76 MS
EKG QTC CALCULATION (BAZETT): 434 MS
EKG R AXIS: -38 DEGREES
EKG T AXIS: 23 DEGREES
EKG VENTRICULAR RATE: 98 BPM

## 2022-04-02 PROCEDURE — 93010 ELECTROCARDIOGRAM REPORT: CPT | Performed by: INTERNAL MEDICINE

## 2022-04-05 ENCOUNTER — TELEPHONE (OUTPATIENT)
Dept: CARDIOLOGY CLINIC | Age: 87
End: 2022-04-05

## 2022-04-05 NOTE — TELEPHONE ENCOUNTER
Spoke to pt- was never taking norvasc tid- was taking qd and er put him up to bid. (per pt) today bp was 80/50 became lightheaded and had vision loss.  Per Dr. Peter Heart cut back down to qd and monitor bp.call in 2 weeks with readings

## 2022-04-05 NOTE — TELEPHONE ENCOUNTER
Pt was seen in ER 4/1/22 for CP and SOB. He was told to follow up with Dr. Minesh Graham in 2 days. Would you like to see him?  Please advise

## 2022-04-26 ENCOUNTER — OFFICE VISIT (OUTPATIENT)
Dept: FAMILY MEDICINE CLINIC | Age: 87
End: 2022-04-26
Payer: MEDICARE

## 2022-04-26 VITALS
RESPIRATION RATE: 18 BRPM | HEART RATE: 71 BPM | TEMPERATURE: 98.1 F | DIASTOLIC BLOOD PRESSURE: 80 MMHG | SYSTOLIC BLOOD PRESSURE: 132 MMHG | BODY MASS INDEX: 24.68 KG/M2 | OXYGEN SATURATION: 97 % | WEIGHT: 172 LBS

## 2022-04-26 DIAGNOSIS — R73.9 HYPERGLYCEMIA: ICD-10-CM

## 2022-04-26 DIAGNOSIS — G62.9 NEUROPATHY: ICD-10-CM

## 2022-04-26 DIAGNOSIS — H53.9 VISION ABNORMALITIES: Primary | ICD-10-CM

## 2022-04-26 LAB — HBA1C MFR BLD: 6.2 %

## 2022-04-26 PROCEDURE — 1036F TOBACCO NON-USER: CPT | Performed by: INTERNAL MEDICINE

## 2022-04-26 PROCEDURE — 4040F PNEUMOC VAC/ADMIN/RCVD: CPT | Performed by: INTERNAL MEDICINE

## 2022-04-26 PROCEDURE — G8420 CALC BMI NORM PARAMETERS: HCPCS | Performed by: INTERNAL MEDICINE

## 2022-04-26 PROCEDURE — 1123F ACP DISCUSS/DSCN MKR DOCD: CPT | Performed by: INTERNAL MEDICINE

## 2022-04-26 PROCEDURE — 99214 OFFICE O/P EST MOD 30 MIN: CPT | Performed by: INTERNAL MEDICINE

## 2022-04-26 PROCEDURE — 83036 HEMOGLOBIN GLYCOSYLATED A1C: CPT | Performed by: INTERNAL MEDICINE

## 2022-04-26 PROCEDURE — G8427 DOCREV CUR MEDS BY ELIG CLIN: HCPCS | Performed by: INTERNAL MEDICINE

## 2022-04-26 RX ORDER — SERTRALINE HYDROCHLORIDE 25 MG/1
TABLET, FILM COATED ORAL
COMMUNITY
Start: 2022-02-22

## 2022-04-26 RX ORDER — GABAPENTIN 100 MG/1
100 CAPSULE ORAL 2 TIMES DAILY PRN
Qty: 60 CAPSULE | Refills: 1 | Status: ON HOLD | OUTPATIENT
Start: 2022-04-26 | End: 2022-09-13 | Stop reason: HOSPADM

## 2022-04-26 ASSESSMENT — ENCOUNTER SYMPTOMS
EYE PAIN: 1
COLOR CHANGE: 0
BLOOD IN STOOL: 0
ABDOMINAL PAIN: 0
COUGH: 0
ALLERGIC/IMMUNOLOGIC NEGATIVE: 1
NAUSEA: 0
EYE ITCHING: 0
SORE THROAT: 0
CONSTIPATION: 0
WHEEZING: 0
RHINORRHEA: 0
EYE REDNESS: 0
EYE DISCHARGE: 0
SHORTNESS OF BREATH: 0

## 2022-04-26 NOTE — PROGRESS NOTES
HPI Notes    Name: Brandan Riley  : 1935         Chief Complaint:     Chief Complaint   Patient presents with    Eye Problem     patient states that he is having issues with his vision, states that he is having blurred vision, sometimes spotty vision, sometimes points of no vision at all. He has had his glucose checked at the [place] and it is WNL. He is worried about Diabetes, states that he has 2 son's with diabetes. Patient does have Macular Degeneration- treated at the eye doctor. History of Present Illness:        Mr. Sukhwinder Malhotra presents to office for evaluation of blurry vision and concerns with diabetes. Also c/o neuropathy pain in his feet. Follows up with podiatrist Dr. Alex Varner . Has loss of sensation in both feet. Pain is burning, constant, worse at nighttime. Most of the time its mild but sometimes can be moderate to severe  Had no recent falls     States has a h/o macular degeneration in the left eye. Follows up with an eye doctor in [place]. Has a history of cataract surgery both eyes about 20 years ago  His vision is deteriorating. Says \" it comes and goes\". Sometimes completely loses vision and can't see at all for a few minutes. Then it goes away on its own. Has some sharp pains in the right eye but they last a second or 2 and disappeared. Reports no headaches, no dizziness, no earaches, no sore throat    Patient was in ER on 2022 for evaluation of chest pain. During his work-up his glucose was 200. In the past he had hemoglobin A1c of 5.7% on 2021. States both of his sons have diabetes and on insulin and he is concerned that he might have diabetes.           Past Medical History:     Past Medical History:   Diagnosis Date    Anginal pain ([address]     CAD (coronary artery disease)     CVA (cerebral vascular accident) ([address] 2020    CVA (cerebral vascular accident) ([address] 2020    Diverticula of colon     Hyperlipidemia     Hypertension     Kidney stone Reviewed all health maintenance requirements and orderedappropriate tests  There are no preventive care reminders to display for this patient. Past Surgical History:     Past Surgical History:   Procedure Laterality Date    ABDOMEN SURGERY      partial gastrectomy    ANGIOPLASTY  06/29/2016    Dr. Felipe Castillo @ [place] [id number]  10/02/2018    Dr. Mariel De Jesus @ [facility]-- Stent x 1    APPENDECTOMY      CARDIAC CATHETERIZATION Left 12/24/2014    Dr. Britton Boyd -2 vessel CAD-    CARDIAC CATHETERIZATION  05/04/2007    Dr Mariel De Jesus: 1. Normal left ventricular systolic function with an estimated ejection fraction of 70%. 2. Elevated left ventricular end-diastolic pressure following angiographic dye load. 3. Double-vessel coronary artery disease with angiographic edivence of restenosis within the intervened-upon segment in the right coronary artery and moderately severe disease just proximal     CARDIAC CATHETERIZATION  5/4/07 con't    to the stented segment in the mid left anterior descending.  CARDIAC CATHETERIZATION  06/12/2008    Dr Blanco: 1. Moderate coronary artery disease, as described, with patent stents with evidence of mild at most in-stent restenosis of the right coronary artery. 2. Normal left ventricular systolic function.  CARDIAC CATHETERIZATION Left 02/18/2022    Dr. Felipe Castillo @ [facility]--Medical therapy    CARDIAC SURGERY      13 stents, aorta and CAD    CHOLECYSTECTOMY      COLECTOMY      COLONOSCOPY      COLONOSCOPY  03/20/2015    DILATATION, ESOPHAGUS      EYE SURGERY Bilateral     cataracts    HERNIA REPAIR Right     inguinal    JOINT REPLACEMENT Right 1998 and 2012     knee (3rd surgery)    JOINT REPLACEMENT Right 2012    hip    [place] ESOPHAGOGASTRODUODENOSCOPY TRANSORAL DIAGNOSTIC N/A 06/14/2017    EGD ESOPHAGOGASTRODUODENOSCOPY; photos; bx's performed by Mariama Moraes MD at [address] [place] [address] [facility]  03/09/2011    Dr Giuliana Rivas.  Hyperdynamic left ventricular systolic function, estimated ejection fraction of 70%. 2. Normal left ventricular end-diastolic pressure. 3. Double-vessel coronary artery disease with angiographic disease progression involving the stented segment in the mid right coronary artery consistent with angiographic evidence of restenosis.  STOMACH SURGERY      ulcer, gastric bypass due to \"bleeding ulcer\", partial colectomy due to blockage    UPPER GASTROINTESTINAL ENDOSCOPY          Medications:       Prior to Admission medications    Medication Sig Start Date End Date Taking? Authorizing Provider   sertraline (ZOLOFT) 25 MG tablet take 1 tablet by mouth once daily 2/22/22  Yes Historical Provider, MD   gabapentin (NEURONTIN) 100 MG capsule Take 1 capsule by mouth 2 times daily as needed (neuropathy) for up to 180 days.  Intended supply: 90 days 4/26/22 10/23/22 Yes Francisco Ortiz MD   propranolol (INDERAL) 80 MG tablet Take 80 mg by mouth 2 times daily   Yes Historical Provider, MD GIVENS PO Take by mouth   Yes Historical Provider, MD   amLODIPine (NORVASC) 5 MG tablet Take 5 mg by mouth 2 times daily   Yes Historical Provider, MD   nitroGLYCERIN (NITROSTAT) 0.4 MG SL tablet Place 1 tablet under the tongue every 5 minutes as needed for Chest pain 1/11/22  Yes Lauro Reardon MD   venlafaxine [facility]) 100 MG tablet Take 1 tablet by mouth 2 times daily 1/6/22  Yes Francisco Ortiz MD   ibuprofen (IBU) 400 MG tablet Take 1 tablet by mouth every 6 hours as needed for Pain 10/25/21  Yes Rebecca Galaviz MD   propranolol (INDERAL LA) 160 MG extended release capsule Take 1 capsule by mouth daily  Patient taking differently: Take 160 mg by mouth daily Takes this only when needed if having chest pain 9/27/21  Yes Lauro Reardon MD   nitroGLYCERIN (NITROSTAT) 0.4 MG SL tablet Place 1 tablet under the tongue every 5 minutes as needed (chest pain) 9/24/21  Yes Lauro Reardon MD   apixaban (ELIQUIS) 5 MG TABS tablet Take by mouth 2 times daily   Yes Historical Provider, MD atorvastatin (LIPITOR) 80 MG tablet Take 80 mg by mouth daily   Yes Historical Provider, MD   ondansetron (ZOFRAN) 4 MG tablet Take 1 tablet by mouth every 6 hours as needed for Nausea 4/10/18  Yes REMY Castro CNP   docusate sodium (COLACE) 100 MG capsule Take 1 capsule by mouth 3 times daily as needed for Constipation 4/10/18  Yes REMY Castro CNP   omeprazole (PRILOSEC) 20 MG capsule Take 40 mg by mouth Daily    Yes Historical Provider, MD   vitamin D (CHOLECALCIFEROL) 1000 UNIT TABS tablet Take 3,000 Units by mouth daily    Yes Historical Provider, MD   prochlorperazine (COMPAZINE) 10 MG tablet Take 1 tablet by mouth every 6 hours as needed (Headache and nausea) 10/25/21 10/30/21  Michelle Self MD   clopidogrel (PLAVIX) 75 MG tablet Take 75 mg by mouth every other day   Patient not taking: Reported on 4/1/2022    Historical Provider, MD        Allergies:       Pcn [penicillins], Ranolazine, and Penicillamine    Social History:     Tobacco: reports that he quit smoking about 55 years ago. He smoked 10.00 packs per day. He has never used smokeless tobacco.  Alcohol:      reports no history of alcohol use. Drug Use:  reports no history of drug use. Family History:     Family History   Problem Relation Age of Onset    Heart Attack Father     Heart Attack Brother        Review of Systems:         Review of Systems   Constitutional: Negative for activity change, appetite change, diaphoresis, fatigue and unexpected weight change. HENT: Negative for congestion, ear discharge, ear pain, rhinorrhea and sore throat. Eyes: Positive for pain and visual disturbance. Negative for discharge, redness and itching. Respiratory: Negative for cough, shortness of breath and wheezing. Cardiovascular: Negative for chest pain, palpitations and leg swelling. Gastrointestinal: Negative for abdominal pain, blood in stool, constipation and nausea.    Endocrine: Negative for cold intolerance, heat intolerance, polydipsia and polyuria. Genitourinary: Negative for difficulty urinating and dysuria. Musculoskeletal: Negative. Burning pain in feet   Skin: Negative for color change and rash. Allergic/Immunologic: Negative. Neurological: Positive for numbness (feet). Negative for dizziness, syncope, weakness and headaches. Psychiatric/Behavioral: Negative for behavioral problems and dysphoric mood. The patient is not nervous/anxious. Physical Exam:     Vitals:  /80   Pulse 71   Temp 98.1 °F (36.7 °C) (Temporal)   Resp 18   Wt 172 lb (78 kg)   SpO2 97%   BMI 24.68 kg/m²       Physical Exam  Vitals reviewed. Constitutional:       General: He is not in acute distress. Appearance: Normal appearance. He is well-developed. He is not ill-appearing. HENT:      Head: Normocephalic and atraumatic. Right Ear: Tympanic membrane, ear canal and external ear normal.      Left Ear: Tympanic membrane, ear canal and external ear normal.      Nose: Nose normal. No congestion. Mouth/Throat:      Mouth: Mucous membranes are moist.      Pharynx: Oropharynx is clear. Eyes:      General: No scleral icterus. Right eye: No discharge. Left eye: No discharge. Conjunctiva/sclera: Conjunctivae normal.   Neck:      Thyroid: No thyromegaly. Cardiovascular:      Rate and Rhythm: Normal rate and regular rhythm. Heart sounds: Normal heart sounds. No murmur heard. Pulmonary:      Effort: Pulmonary effort is normal.      Breath sounds: Normal breath sounds. No wheezing or rales. Abdominal:      General: Bowel sounds are normal. There is no distension. Palpations: Abdomen is soft. There is no mass. Tenderness: There is no abdominal tenderness. Musculoskeletal:         General: No tenderness. Normal range of motion. Right lower leg: No edema. Left lower leg: No edema. Lymphadenopathy:      Cervical: No cervical adenopathy.    Skin: General: Skin is warm and dry. Coloration: Skin is not jaundiced or pale. Findings: No rash. Neurological:      General: No focal deficit present. Mental Status: He is alert and oriented to person, place, and time. Mental status is at baseline. Psychiatric:         Mood and Affect: Mood normal.         Behavior: Behavior normal.         Thought Content: Thought content normal.               Data:     Lab Results   Component Value Date     04/01/2022    K 4.1 04/01/2022     04/01/2022    CO2 19 04/01/2022    BUN 21 04/01/2022    CREATININE 1.35 04/01/2022    GLUCOSE 200 04/01/2022    GLUCOSE 110 11/15/2011    PROT 6.9 04/01/2022    LABALBU 4.2 04/01/2022    BILITOT 0.31 04/01/2022    ALKPHOS 86 04/01/2022    AST 17 04/01/2022    ALT 16 04/01/2022     Lab Results   Component Value Date    WBC 5.7 04/01/2022    RBC 5.40 04/01/2022    RBC 4.51 11/15/2011    HGB 14.8 04/01/2022    HCT 44.1 04/01/2022    MCV 81.5 04/01/2022    MCH 27.4 04/01/2022    MCHC 33.6 04/01/2022    RDW 15.1 04/01/2022     04/01/2022     11/15/2011    MPV NOT REPORTED 02/08/2022     Lab Results   Component Value Date    TSH 2.39 08/30/2021     Lab Results   Component Value Date    CHOL 217 02/08/2022    HDL 40 02/08/2022    PSA 0.72 07/25/2017    LABA1C 6.2 04/26/2022          Assessment & Plan        Diagnosis Orders   1. Vision abnormalities   Patient advised to follow-up with his ophthalmologist.  I offered her referral to [facility] in [place], however [facility] declined [facility]    2. Hyperglycemia   Patient's hemoglobin A1c is 6.2% today. Advised low-carb diet. Would not initiate medications due to risk with hypoglycemia in his age group. POCT glycosylated hemoglobin (Hb A1C)   3. Neuropathy   Prescribed gabapentin 100 mg twice daily as needed. Patient is educated regarding potential side effects with gabapentin including dizziness and risk for falls.   He stated that he will take it only at bedtime and if does not experience side effects then might try daytime as well.  gabapentin (NEURONTIN) 100 MG capsule                   Completed Refills   Requested Prescriptions     Signed Prescriptions Disp Refills    gabapentin (NEURONTIN) 100 MG capsule 60 capsule 1     Sig: Take 1 capsule by mouth 2 times daily as needed (neuropathy) for up to 180 days. Intended supply: 90 days     No follow-ups on file. Orders Placed This Encounter   Medications    gabapentin (NEURONTIN) 100 MG capsule     Sig: Take 1 capsule by mouth 2 times daily as needed (neuropathy) for up to 180 days. Intended supply: 90 days     Dispense:  60 capsule     Refill:  1     Orders Placed This Encounter   Procedures    POCT glycosylated hemoglobin (Hb A1C)         Patient Instructions     SURVEY:    You may be receiving a survey from [employer] regarding your visit today. Please complete the survey to enable us to provide the highest quality of care to you and your family. If you cannot score us a very good on any question, please call the office to discuss how we could have made your experience a very good one. Thank you. MD Anil Grande MD Meri Arn, MD Cathlean Duster, MD Ramiro Sciara, MD Matt Pluck, AuD [facility] [place], [place]  ADRIEL Buckner [place], [place]        Electronically signed by Amy Mccarthy MD on 4/26/2022 at 11:16 AM           Completed Refills      Requested Prescriptions     Signed Prescriptions Disp Refills    gabapentin (NEURONTIN) 100 MG capsule 60 capsule 1     Sig: Take 1 capsule by mouth 2 times daily as needed (neuropathy) for up to 180 days.  Intended supply: 90 days

## 2022-04-26 NOTE — PATIENT INSTRUCTIONS
SURVEY:    You may be receiving a survey from [email or web address] regarding your visit today. Please complete the survey to enable us to provide the highest quality of care to you and your family. If you cannot score us a very good on any question, please call the office to discuss how we could have made your experience a very good one. Thank you.   [address] [place] [address]  MD Cecy Dempsey MD Earlie Redo, MD Ezzard Caldron, MD Candie Maizes, MD Christeen Abts, Joaquin Gee, [facility] [place]  [facility], [address] [place] [address], [address] [place] [address] ADRIEL Russell, [address] [place] [address] [place]

## 2022-05-26 ENCOUNTER — HOSPITAL ENCOUNTER (OUTPATIENT)
Dept: MRI IMAGING | Age: 87
Discharge: HOME | End: 2022-05-28
Payer: MEDICARE

## 2022-05-26 ENCOUNTER — OFFICE VISIT (OUTPATIENT)
Dept: CARDIOLOGY CLINIC | Age: 87
End: 2022-05-26
Payer: MEDICARE

## 2022-05-26 VITALS
WEIGHT: 171 LBS | DIASTOLIC BLOOD PRESSURE: 70 MMHG | HEART RATE: 67 BPM | OXYGEN SATURATION: 98 % | SYSTOLIC BLOOD PRESSURE: 140 MMHG | BODY MASS INDEX: 24.54 KG/M2

## 2022-05-26 DIAGNOSIS — I10 HYPERTENSION, UNSPECIFIED TYPE: ICD-10-CM

## 2022-05-26 DIAGNOSIS — E55.9 VITAMIN D DEFICIENCY DISEASE: ICD-10-CM

## 2022-05-26 DIAGNOSIS — R09.89 BRUIT: ICD-10-CM

## 2022-05-26 DIAGNOSIS — G45.9 TIA (TRANSIENT ISCHEMIC ATTACK): ICD-10-CM

## 2022-05-26 DIAGNOSIS — R55 SYNCOPE, UNSPECIFIED SYNCOPE TYPE: ICD-10-CM

## 2022-05-26 DIAGNOSIS — I48.91 ATRIAL FIBRILLATION, UNSPECIFIED TYPE (HCC): Primary | ICD-10-CM

## 2022-05-26 PROCEDURE — 70551 MRI BRAIN STEM W/O DYE: CPT

## 2022-05-26 PROCEDURE — 99214 OFFICE O/P EST MOD 30 MIN: CPT | Performed by: INTERNAL MEDICINE

## 2022-05-26 PROCEDURE — 1123F ACP DISCUSS/DSCN MKR DOCD: CPT | Performed by: INTERNAL MEDICINE

## 2022-05-26 PROCEDURE — G8420 CALC BMI NORM PARAMETERS: HCPCS | Performed by: INTERNAL MEDICINE

## 2022-05-26 PROCEDURE — G8427 DOCREV CUR MEDS BY ELIG CLIN: HCPCS | Performed by: INTERNAL MEDICINE

## 2022-05-26 PROCEDURE — 1036F TOBACCO NON-USER: CPT | Performed by: INTERNAL MEDICINE

## 2022-05-26 RX ORDER — METOPROLOL TARTRATE 50 MG/1
50 TABLET, FILM COATED ORAL DAILY
COMMUNITY

## 2022-05-26 NOTE — PROGRESS NOTES
Ov Dr. Igor Renae for 3 month f/u   Cath on 2/18/22  Had episode couple weeks ago   Was at \[facility]\"" with wife  Getting her infusion - stood up and   Passed out- lasted few sec/min   Did not have chest pain or palpitations   Only sx prior was when he was driving   To go get wife- had vision changes of   Blurriness only able to watch white line   Lasted 20-45 min  Did not get nauseated/no sweating  Refused to go to ER- was told   By nurse might have been another TIA  Since then no chest pain   No sob   Had one episode of chest pain about month ago - took imdur 160 mg one and helped   Will use them as needed   C/o having low blood sugars- 40-50  Will eat a piece of candy   Sons lives in [place], [place]  His place is up for sale now   Son in [place] want them to move  [place] the will not live with them   Will just buy something close       Call back with Medications you are taking   (read right off the bottle the Name/mg/how you are taking)    Call Dr. Jessica Barton for appt for low blood sugar     Will set up for carotid U/S and MRI brain with contrast

## 2022-05-26 NOTE — LETTER
Evangelina Ahumada, M.D. [address] [place] [address]  [phone]        May 26, 2022        Shanta Lopez MD  [address] [facility], [address] [facility]    RE:   Rigo Perez  :  1935    Dear Dr. Denny Jones:    CHIEF COMPLAINT:  Syncopal episode. HISTORY OF PRESENT ILLNESS:  I had the pleasure of seeing Mr. Rigo Perez in the office on 2022. He is a pleasant 82-year-old gentleman who had a catheterization by Dr. Edenilson López on 2007, that showed severe disease in the right coronary artery, in which a 3.5 x 24 mm Taxus stent was placed, also angioplasty and stent placement in the LAD using a 2.75 x 12 mm Taxus stent. On 2011, he had 80% disease in the right coronary artery, with brachytherapy and placement of a 4.0 x 28 mm Promus stent. On 2016, he had 50% in-stent stenosis of the right coronary artery, 50% LAD, 80% mid LAD, and the LAD was stented with a 3.5 x 30 mm Resolute stent. On 10/02/2018, he had a 4.0 x 22 mm Synergy stent placed in his LAD. On 2019, a catheterization showed critical left main trunk disease, with 75% LAD with ostial circumflex, the right coronary artery had 80% disease, EF of 65%. This resulted in an open heart surgery by Dr. Reyna Kearney on 2019, with a LIMA to the LAD, vein graft to the PDA, and vein graft to the OM branch of the circumflex. On 2020, he had weakness in his left arm and left lower extremity, and a CTA showed occlusion of the distal intradural segment of the right ventricular lobe with a right middle cerebral artery territory stroke and ischemic infarct. It was felt that this was embolic from possible atrial fibrillation and he was placed on Eliquis 5 mg b.i.d. He was scheduled to have an event recorder, but he did not wish to do this.   Therefore on 2020, we decided to continue Eliquis and Plavix even though there was not a clear indication for atrial fibrillation. On 01/25/2022, he continued to have chest discomfort. We decided to proceed with a cardiac catheterization, which was done on 02/18/2022. This showed a widely patent LIMA to the LAD, widely patent vein graft to the OM, and widely patent vein graft to the PDA of the right coronary artery, his EF was 60%. Medical therapy was recommended. He unfortunately has continued to have intermittent chest discomfort. He did go to the emergency room on 04/01/2021, with chest discomfort and was discharged. We increased his Norvasc to 5 mg t.i.d. from b.i.d. dosing. Several weeks ago, he was at [facility] with his wife. She was getting her infusion for severe arthritis. He stood up and had a syncopal episode. He denied chest pain or palpitations. He had symptoms prior when he was driving to go get his wife, he had vision changes with blurriness, only able to watch the white line. This lasted for 20 to 25 minutes. He did not get nauseated and no sweating. He refused to go to the emergency room. He was told by the nurse that he may have another TIA. He does complain of having \"low blood sugars. \"  When he has those episodes of dizziness and near syncope, his blood sugars are sometimes in the 40 to 50 range. He will eat a piece of candy and his symptoms seem to get better. His son lives in [address]. Mr. Anaid Martinez has his place up for sale now. His son wants him to move in there, but he does not want to live with him but rather just buy something close. He denies any palpitations. He has had no exertional chest pain. He does have weakness. CARDIAC RISK FACTORS:  Known CAD:  Positive. PTCA:  Positive. Open Heart Surgery:  Positive. Hypertension:  Positive. Hyperlipidemia:  Positive. Other Family Members:  Positive. Peripheral Vascular Disease:  Negative. Diabetes:  Negative. Smoking:  Negative.     MEDICATIONS AT HOME:  He is currently on Eliquis 5 mg b.i.d., Lipitor 80 mg daily, Colace 100 mg t.i.d. p.r.n., Neurontin 100 mg b.i.d., Lopressor 50 mg daily, Prilosec 20 mg two tablets daily, Zofran p.r.n., Compazine p.r.n., Zoloft 25 mg daily, Effexor 100 mg b.i.d., vitamin D 3000 units daily. PAST MEDICAL AND SURGICAL HISTORY:  1. Cardiac as above. 2.  Right knee surgery on 2013. 3.  Hypertension. 4.  Hyperlipidemia. 5.  Right knee replacement. 6.  Ureteral stones. 7.  Bilateral inguinal hernia repair. 8.  Ventral hernia, which has not repaired. 9.  Cervical disk disease. 10.  CVA on 2020, with mild residual left-sided weakness. 11.  Status post common bile duct dilatation secondary to pancreatitis. FAMILY HISTORY:  Father  of MI at 76. Brother  of MI at 54. Sister  of MI at 67. SOCIAL HISTORY:  He is 82years old, . Three sons, one is a  in [place], another has traumatic stress syndrome with Parkinson's, and one son is an advisor to a company. Mr. Edenilson Lee does not smoke or drink alcohol. He walks with a cane. He has one son in [place], [place], who wants him to move close by. Mr. Edenilson Lee has his place up for sale now. REVIEW OF SYSTEMS:  Cardiac as above. Other systems reviewed including constitutional, eyes, ears, nose and throat, cardiovascular, respiratory, GI, , musculoskeletal, integumentary, neurologic, endocrine, hematologic and allergic/immunologic are negative except for what is described above. No weight loss or weight gain. No change in bowel habits, no blood in stools. No fevers, sweats or chills. PHYSICAL EXAMINATION:  VITAL SIGNS:  His blood pressure was 140/70 with a heart rate of 67 and regular. Respiratory rate 18. O2 saturation 98%. Weight 171 pounds. GENERAL:  He is a pleasant 53-year-old gentleman. Denied pain. He was oriented to person, place and time. Answered questions appropriately. SKIN:  No unusual skin changes.   HEENT:  The pupils are equally round and intact. Mucous membranes were dry. NECK:  No JVD. Good carotid pulses. No carotid bruits. No lymphadenopathy or thyromegaly. CARDIOVASCULAR EXAM:  S1 and S2 were normal.  No S3 or S4. Soft systolic blowing type murmur. No diastolic murmur. PMI was normal.  No lift, thrust, or pericardial friction rub. LUNGS:  Clear to auscultation and percussion. ABDOMEN:  Soft and nontender. Good bowel sounds. EXTREMITIES:  Good femoral pulses. Good pedal pulses. No pedal edema. Skin was warm and dry. No calf tenderness. Nail beds pink. Good cap refill. PULSES:  Bilateral symmetrical radial, brachial and carotid pulses. No carotid bruits. Good femoral and pedal pulses. NEUROLOGIC EXAM:  Within normal limits. PSYCHIATRIC EXAM:  Within normal limits. LABORATORY DATA:  From 04/01/2022, sodium 137, potassium 4.1, BUN 21, creatinine 1.35, GFR was 50. ALT was 16, AST was 17. Hemoglobin A1c was 6.2. White count 5.7, hemoglobin 14.8 with a platelet count of 582,[phone]. His EKG showed normal sinus rhythm with PACs and LVH. MRI of the brain without contrast because of his episode of syncope, which was felt to possibly be TIA, was negative. Doppler carotid ultrasound has not been done as of yet. IMPRESSION:  1. Episode of syncope when he was at [facility] getting his wife who was getting infusion for rheumatoid arthritis, which I think was most likely due to orthostatic hypotension. 2.  Episode of dizziness and when he checks his blood sugar, he states it is low in the 40 to 50 range. 3.  Chest pain, mainly noncardiac. 4.  Severe CAD. 5.  Last catheterization on 02/18/2022, which showed patent LIMA to the LAD, patent vein graft to the high lateral branch of the circumflex or the first OM, and patent saphenous vein graft to the PDA of the right coronary artery, with severe disease in the native vessels and normal LV function, EF of 60%.   6.  Catheterization on 05/20/2001, at [facility] with stenting of the proximal and mid right coronary artery with bare-metal stents. 7.  Stenting of the LAD in [place] in 2004. 8.  Brachytherapy of the LAD in 2004. 9.  Restenosis of the right coronary artery on 05/04/2007, placing a 3.5 x 24 mm Taxus stent in the right coronary artery and a 2.75 x 12 mm Taxus stent in the LAD. 10.  A 4.0 x 28 mm Promus stent in the right coronary artery on 03/09/2011. 11.  Catheterization on 06/29/2016 showed 80% in-stent stenosis of the mid LAD, 50% RCA, unremarkable circumflex, EF of 70%, with stenting of the mid LAD with a 3.5 x 30 mm Resolute stent. 12.  Catheterization by Dr. Gab Patiño on 06/17/2019, showing 50% left main trunk, 75% LAD, 80% RCA, 75% first OM or high lateral circumflex, with an EF of 65%. 13.  Open heart surgery on 06/24/2019, by Dr. Sulema Mazariegos, with a LIMA to the LAD, a vein graft to the OM, and a vein graft to the right coronary artery. 14.  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 along with Plavix 75 mg every other day even though atrial fibrillation has not been documented, with him rather doing Eliquis rather than doing an implantable loop recorder to monitor for atrial fibrillation. PLAN:  1. No change in medications. 2.  Call Dr. Hal Perea for an appointment for his low blood pressures. 3.  Wait for the carotid ultrasound with his MRI being negative for any recent embolic event. DISCUSSION:  Mr. Oralia Lane had a syncopal episode while he was in [address] up his wife. I suspect that this was orthostatic hypotension, although he was told it was possibly another TIA.  MRI, however, was negative for any CNS event. We are doing carotid ultrasounds. He has been checking his blood sugar when he has been lightheaded and he has had some low blood sugars in the 40 to 50 range.   I asked him to talk to Dr. Hal Perea concerning his hypoglycemia. It is possible that his device is inaccurate. I will plan on seeing him in 6 months unless a problem would develop. His cardiac status appears to be stable. He continues to have chest pain; however, again his catheterization showed he had widely patent grafts. Thank you very much for allowing me the privilege of seeing Mr. Cohn Esposito. If you have any questions on my thoughts, please do not hesitate to contact me.     Sincerely,        Roxanna Coy    D: 05/31/2022 19:51:25     T: 06/01/2022 3:04:41     [email or web address]  Job#: [id number]   Doc#: [id number]

## 2022-06-01 NOTE — PROGRESS NOTES
Nunu Hale M.D. [address] [place] [address]  [phone]        May 26, 2022        Sandra Sage MD  [address] [facility], [address] [place] [address]    RE:   Olu Flores  :  1935    Dear Dr. Freda De Paz:    CHIEF COMPLAINT:  Syncopal episode. HISTORY OF PRESENT ILLNESS:  I had the pleasure of seeing Mr. Olu Flores in the office on 2022. He is a pleasant 82-year-old gentleman who had a catheterization by Dr. Kathleen Reddy on 2007, that showed severe disease in the right coronary artery, in which a 3.5 x 24 mm Taxus stent was placed, also angioplasty and stent placement in the LAD using a 2.75 x 12 mm Taxus stent. On 2011, he had 80% disease in the right coronary artery, with brachytherapy and placement of a 4.0 x 28 mm Promus stent. On 2016, he had 50% in-stent stenosis of the right coronary artery, 50% LAD, 80% mid LAD, and the LAD was stented with a 3.5 x 30 mm Resolute stent. On 10/02/2018, he had a 4.0 x 22 mm Synergy stent placed in his LAD. On 2019, a catheterization showed critical left main trunk disease, with 75% LAD with ostial circumflex, the right coronary artery had 80% disease, EF of 65%. This resulted in an open heart surgery by Dr. Tino Good on 2019, with a LIMA to the LAD, vein graft to the PDA, and vein graft to the OM branch of the circumflex. On 2020, he had weakness in his left arm and left lower extremity, and a CTA showed occlusion of the distal intradural segment of the right ventricular lobe with a right middle cerebral artery territory stroke and ischemic infarct. It was felt that this was embolic from possible atrial fibrillation and he was placed on Eliquis 5 mg b.i.d. He was scheduled to have an event recorder, but he did not wish to do this.   Therefore on 2020, we decided to continue Eliquis and Plavix even though there was not a clear indication for atrial fibrillation. On 01/25/2022, he continued to have chest discomfort. We decided to proceed with a cardiac catheterization, which was done on 02/18/2022. This showed a widely patent LIMA to the LAD, widely patent vein graft to the OM, and widely patent vein graft to the PDA of the right coronary artery, his EF was 60%. Medical therapy was recommended. He unfortunately has continued to have intermittent chest discomfort. He did go to the emergency room on 04/01/2021, with chest discomfort and was discharged. We increased his Norvasc to 5 mg t.i.d. from b.i.d. dosing. Several weeks ago, he was at [facility] with his wife. She was getting her infusion for severe arthritis. He stood up and had a syncopal episode. He denied chest pain or palpitations. He had symptoms prior when he was driving to go get his wife, he had vision changes with blurriness, only able to watch the white line. This lasted for 20 to 25 minutes. He did not get nauseated and no sweating. He refused to go to the emergency room. He was told by the nurse that he may have another TIA. He does complain of having \"low blood sugars. \"  When he has those episodes of dizziness and near syncope, his blood sugars are sometimes in the 40 to 50 range. He will eat a piece of candy and his symptoms seem to get better. His son lives in [address]. Mr. Omega Patton has his place up for sale now. His son wants him to move in there, but he does not want to live with him but rather just buy something close. He denies any palpitations. He has had no exertional chest pain. He does have weakness. CARDIAC RISK FACTORS:  Known CAD:  Positive. PTCA:  Positive. Open Heart Surgery:  Positive. Hypertension:  Positive. Hyperlipidemia:  Positive. Other Family Members:  Positive. Peripheral Vascular Disease:  Negative. Diabetes:  Negative. Smoking:  Negative.     MEDICATIONS AT HOME:  He is currently on Eliquis 5 mg b.i.d., Lipitor 80 mg daily, Colace 100 mg t.i.d. p.r.n., Neurontin 100 mg b.i.d., Lopressor 50 mg daily, Prilosec 20 mg two tablets daily, Zofran p.r.n., Compazine p.r.n., Zoloft 25 mg daily, Effexor 100 mg b.i.d., vitamin D 3000 units daily. PAST MEDICAL AND SURGICAL HISTORY:  1. Cardiac as above. 2.  Right knee surgery on 2013. 3.  Hypertension. 4.  Hyperlipidemia. 5.  Right knee replacement. 6.  Ureteral stones. 7.  Bilateral inguinal hernia repair. 8.  Ventral hernia, which has not repaired. 9.  Cervical disk disease. 10.  CVA on 2020, with mild residual left-sided weakness. 11.  Status post common bile duct dilatation secondary to pancreatitis. FAMILY HISTORY:  Father  of MI at 76. Brother  of MI at 54. Sister  of MI at 67. SOCIAL HISTORY:  He is 82years old, . Three sons, one is a  in [place], another has traumatic stress syndrome with Parkinson's, and one son is an advisor to a company. Mr. Kathie Peter does not smoke or drink alcohol. He walks with a cane. He has one son in [place], [place], who wants him to move close by. Mr. Kathie Peter has his place up for sale now. REVIEW OF SYSTEMS:  Cardiac as above. Other systems reviewed including constitutional, eyes, ears, nose and throat, cardiovascular, respiratory, GI, , musculoskeletal, integumentary, neurologic, endocrine, hematologic and allergic/immunologic are negative except for what is described above. No weight loss or weight gain. No change in bowel habits, no blood in stools. No fevers, sweats or chills. PHYSICAL EXAMINATION:  VITAL SIGNS:  His blood pressure was 140/70 with a heart rate of 67 and regular. Respiratory rate 18. O2 saturation 98%. Weight 171 pounds. GENERAL:  He is a pleasant 82-year-old gentleman. Denied pain. He was oriented to person, place and time. Answered questions appropriately. SKIN:  No unusual skin changes.   HEENT:  The pupils are equally round and intact. Mucous membranes were dry. NECK:  No JVD. Good carotid pulses. No carotid bruits. No lymphadenopathy or thyromegaly. CARDIOVASCULAR EXAM:  S1 and S2 were normal.  No S3 or S4. Soft systolic blowing type murmur. No diastolic murmur. PMI was normal.  No lift, thrust, or pericardial friction rub. LUNGS:  Clear to auscultation and percussion. ABDOMEN:  Soft and nontender. Good bowel sounds. EXTREMITIES:  Good femoral pulses. Good pedal pulses. No pedal edema. Skin was warm and dry. No calf tenderness. Nail beds pink. Good cap refill. PULSES:  Bilateral symmetrical radial, brachial and carotid pulses. No carotid bruits. Good femoral and pedal pulses. NEUROLOGIC EXAM:  Within normal limits. PSYCHIATRIC EXAM:  Within normal limits. LABORATORY DATA:  From 04/01/2022, sodium 137, potassium 4.1, BUN 21, creatinine 1.35, GFR was 50. ALT was 16, AST was 17. Hemoglobin A1c was 6.2. White count 5.7, hemoglobin 14.8 with a platelet count of [phone]. His EKG showed normal sinus rhythm with PACs and LVH. MRI of the brain without contrast because of his episode of syncope, which was felt to possibly be TIA, was negative. Doppler carotid ultrasound has not been done as of yet. IMPRESSION:  1. Episode of syncope when he was at [facility] getting his wife who was getting infusion for rheumatoid arthritis, which I think was most likely due to orthostatic hypotension. 2.  Episode of dizziness and when he checks his blood sugar, he states it is low in the 40 to 50 range. 3.  Chest pain, mainly noncardiac. 4.  Severe CAD. 5.  Last catheterization on 02/18/2022, which showed patent LIMA to the LAD, patent vein graft to the high lateral branch of the circumflex or the first OM, and patent saphenous vein graft to the PDA of the right coronary artery, with severe disease in the native vessels and normal LV function, EF of 60%.   6.  Catheterization on 05/20/2001, at [facility] with stenting of the proximal and mid right coronary artery with bare-metal stents. 7.  Stenting of the LAD in [facility] in 2004. 8.  Brachytherapy of the LAD in 2004. 9.  Restenosis of the right coronary artery on 05/04/2007, placing a 3.5 x 24 mm Taxus stent in the right coronary artery and a 2.75 x 12 mm Taxus stent in the LAD. 10.  A 4.0 x 28 mm Promus stent in the right coronary artery on 03/09/2011. 11.  Catheterization on 06/29/2016 showed 80% in-stent stenosis of the mid LAD, 50% RCA, unremarkable circumflex, EF of 70%, with stenting of the mid LAD with a 3.5 x 30 mm Resolute stent. 12.  Catheterization by Dr. Jim Boas on 06/17/2019, showing 50% left main trunk, 75% LAD, 80% RCA, 75% first OM or high lateral circumflex, with an EF of 65%. 13.  Open heart surgery on 06/24/2019, by Dr. Quinn Roland, with a LIMA to the LAD, a vein graft to the OM, and a vein graft to the right coronary artery. 14.  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 along with Plavix 75 mg every other day even though atrial fibrillation has not been documented, with him rather doing Eliquis rather than doing an implantable loop recorder to monitor for atrial fibrillation. PLAN:  1. No change in medications. 2.  Call Dr. Keyshawn Mayfield for an appointment for his low blood pressures. 3.  Wait for the carotid ultrasound with his MRI being negative for any recent embolic event. DISCUSSION:  Mr. Edenilson Lee had a syncopal episode while he was in [address] up his wife. I suspect that this was orthostatic hypotension, although he was told it was possibly another TIA.  MRI, however, was negative for any CNS event. We are doing carotid ultrasounds. He has been checking his blood sugar when he has been lightheaded and he has had some low blood sugars in the 40 to 50 range.   I asked him to talk to Dr. Keyshawn Mayfield concerning his hypoglycemia. It is possible that his device is inaccurate. I will plan on seeing him in 6 months unless a problem would develop. His cardiac status appears to be stable. He continues to have chest pain; however, again his catheterization showed he had widely patent grafts. Thank you very much for allowing me the privilege of seeing Mr. Reggie Irene. If you have any questions on my thoughts, please do not hesitate to contact me.     Sincerely,        [facility]    D: 05/31/2022 19:51:25     T: 06/01/2022 3:04:41     [email or web address]  Job#: [id number]   Doc#: [id number]

## 2022-06-02 ENCOUNTER — HOSPITAL ENCOUNTER (OUTPATIENT)
Dept: VASCULAR LAB | Age: 87
Discharge: HOME | End: 2022-06-04
Payer: MEDICARE

## 2022-06-02 DIAGNOSIS — R09.89 BRUIT: ICD-10-CM

## 2022-06-02 DIAGNOSIS — R55 SYNCOPE, UNSPECIFIED SYNCOPE TYPE: ICD-10-CM

## 2022-06-02 PROCEDURE — 93880 EXTRACRANIAL BILAT STUDY: CPT

## 2022-06-08 ENCOUNTER — TELEPHONE (OUTPATIENT)
Dept: CARDIOLOGY CLINIC | Age: 87
End: 2022-06-08

## 2022-09-09 ENCOUNTER — HOSPITAL ENCOUNTER (EMERGENCY)
Age: 87
Discharge: HOME | End: 2022-09-09
Attending: EMERGENCY MEDICINE
Payer: MEDICARE

## 2022-09-09 VITALS
DIASTOLIC BLOOD PRESSURE: 60 MMHG | RESPIRATION RATE: 15 BRPM | HEIGHT: 70 IN | WEIGHT: 171 LBS | BODY MASS INDEX: 24.48 KG/M2 | OXYGEN SATURATION: 99 % | TEMPERATURE: 98.1 F | HEART RATE: 85 BPM | SYSTOLIC BLOOD PRESSURE: 102 MMHG

## 2022-09-09 DIAGNOSIS — Z79.01 ANTICOAGULATED: ICD-10-CM

## 2022-09-09 DIAGNOSIS — Z86.79 HISTORY OF ATRIAL FIBRILLATION: ICD-10-CM

## 2022-09-09 DIAGNOSIS — R41.0 DISORIENTATION: Primary | ICD-10-CM

## 2022-09-09 DIAGNOSIS — D50.9 IRON DEFICIENCY ANEMIA, UNSPECIFIED IRON DEFICIENCY ANEMIA TYPE: ICD-10-CM

## 2022-09-09 DIAGNOSIS — K92.2 GASTROINTESTINAL HEMORRHAGE, UNSPECIFIED GASTROINTESTINAL HEMORRHAGE TYPE: ICD-10-CM

## 2022-09-09 LAB
-: ABNORMAL
ABSOLUTE EOS #: 0.1 K/UL (ref 0–0.4)
ABSOLUTE LYMPH #: 0.9 K/UL (ref 1–4.8)
ABSOLUTE MONO #: 0.8 K/UL (ref 0–1)
ALBUMIN SERPL-MCNC: 3.2 G/DL (ref 3.5–5.2)
ALP BLD-CCNC: 105 U/L (ref 40–129)
ALT SERPL-CCNC: 21 U/L (ref 5–41)
ANION GAP SERPL CALCULATED.3IONS-SCNC: 9 MMOL/L (ref 9–17)
AST SERPL-CCNC: 21 U/L
BACTERIA: ABNORMAL
BASOPHILS # BLD: 0 % (ref 0–2)
BASOPHILS ABSOLUTE: 0 K/UL (ref 0–0.2)
BILIRUB SERPL-MCNC: 0.4 MG/DL (ref 0.3–1.2)
BILIRUBIN URINE: NEGATIVE
BUN BLDV-MCNC: 19 MG/DL (ref 8–23)
CALCIUM SERPL-MCNC: 8.4 MG/DL (ref 8.6–10.4)
CHLORIDE BLD-SCNC: 104 MMOL/L (ref 98–107)
CHP ED QC CHECK: NORMAL
CO2: 24 MMOL/L (ref 20–31)
COLOR: YELLOW
COMMENT UA: ABNORMAL
CREAT SERPL-MCNC: 0.96 MG/DL (ref 0.7–1.2)
EOSINOPHILS RELATIVE PERCENT: 1 % (ref 0–5)
GFR AFRICAN AMERICAN: >60 ML/MIN
GFR NON-AFRICAN AMERICAN: >60 ML/MIN
GFR SERPL CREATININE-BSD FRML MDRD: ABNORMAL ML/MIN/{1.73_M2}
GLUCOSE BLD-MCNC: 170 MG/DL (ref 70–99)
GLUCOSE URINE: NEGATIVE
HCT VFR BLD CALC: 20.7 % (ref 41–53)
HCT VFR BLD CALC: 23.8 % (ref 41–53)
HEMOCCULT STL QL: POSITIVE
HEMOGLOBIN: 6.8 G/DL (ref 13.5–17.5)
HEMOGLOBIN: 7.9 G/DL (ref 13.5–17.5)
KETONES, URINE: NEGATIVE
LEUKOCYTE ESTERASE, URINE: NEGATIVE
LYMPHOCYTES # BLD: 16 % (ref 13–44)
MCH RBC QN AUTO: 27.5 PG (ref 26–34)
MCHC RBC AUTO-ENTMCNC: 33 G/DL (ref 31–37)
MCV RBC AUTO: 83.3 FL (ref 80–100)
MONOCYTES # BLD: 15 % (ref 5–9)
MUCUS: ABNORMAL
NITRITE, URINE: NEGATIVE
PDW BLD-RTO: 16.7 % (ref 12.1–15.2)
PH UA: 5 (ref 5–8)
PLATELET # BLD: 486 K/UL (ref 140–450)
POTASSIUM SERPL-SCNC: 4.4 MMOL/L (ref 3.7–5.3)
PROTEIN UA: NEGATIVE
RBC # BLD: 2.48 M/UL (ref 4.5–5.9)
RBC UA: ABNORMAL /HPF (ref 0–2)
SARS-COV-2, RAPID: NOT DETECTED
SEG NEUTROPHILS: 68 % (ref 39–75)
SEGMENTED NEUTROPHILS ABSOLUTE COUNT: 3.8 K/UL (ref 2.1–6.5)
SODIUM BLD-SCNC: 137 MMOL/L (ref 135–144)
SPECIFIC GRAVITY UA: 1.02 (ref 1–1.03)
SPECIMEN DESCRIPTION: NORMAL
TOTAL PROTEIN: 5.7 G/DL (ref 6.4–8.3)
TROPONIN, HIGH SENSITIVITY: 29 NG/L (ref 0–22)
TURBIDITY: CLEAR
URINE HGB: NEGATIVE
UROBILINOGEN, URINE: NORMAL
WBC # BLD: 5.6 K/UL (ref 3.5–11)
WBC UA: ABNORMAL /HPF

## 2022-09-09 PROCEDURE — 85025 COMPLETE CBC W/AUTO DIFF WBC: CPT

## 2022-09-09 PROCEDURE — 81001 URINALYSIS AUTO W/SCOPE: CPT

## 2022-09-09 PROCEDURE — 86900 BLOOD TYPING SEROLOGIC ABO: CPT

## 2022-09-09 PROCEDURE — P9016 RBC LEUKOCYTES REDUCED: HCPCS

## 2022-09-09 PROCEDURE — C9803 HOPD COVID-19 SPEC COLLECT: HCPCS

## 2022-09-09 PROCEDURE — 36430 TRANSFUSION BLD/BLD COMPNT: CPT

## 2022-09-09 PROCEDURE — 84484 ASSAY OF TROPONIN QUANT: CPT

## 2022-09-09 PROCEDURE — 85014 HEMATOCRIT: CPT

## 2022-09-09 PROCEDURE — 99285 EMERGENCY DEPT VISIT HI MDM: CPT

## 2022-09-09 PROCEDURE — 87635 SARS-COV-2 COVID-19 AMP PRB: CPT

## 2022-09-09 PROCEDURE — 86850 RBC ANTIBODY SCREEN: CPT

## 2022-09-09 PROCEDURE — 85018 HEMOGLOBIN: CPT

## 2022-09-09 PROCEDURE — 86901 BLOOD TYPING SEROLOGIC RH(D): CPT

## 2022-09-09 PROCEDURE — 80053 COMPREHEN METABOLIC PANEL: CPT

## 2022-09-09 PROCEDURE — 86920 COMPATIBILITY TEST SPIN: CPT

## 2022-09-09 PROCEDURE — 36415 COLL VENOUS BLD VENIPUNCTURE: CPT

## 2022-09-09 RX ORDER — SODIUM CHLORIDE 9 MG/ML
INJECTION, SOLUTION INTRAVENOUS PRN
Status: DISCONTINUED | OUTPATIENT
Start: 2022-09-09 | End: 2022-09-09 | Stop reason: HOSPADM

## 2022-09-09 ASSESSMENT — PAIN - FUNCTIONAL ASSESSMENT: PAIN_FUNCTIONAL_ASSESSMENT: NONE - DENIES PAIN

## 2022-09-09 NOTE — ED NOTES
Lunch tray ordered for patient, patient is able to feed self, wife is at bedside, patient no complaints at this time.      Roland Amaral RN  09/09/22 [id number]

## 2022-09-09 NOTE — ED NOTES
Dr. Nirmala Newberry updates patient on plan of care, Dr. Nirmala Newberry states patient should be transferred to [facility] to see Gi specialist for GI bleed, patient refuses, patient states he wants to go home and wife and patient will monitor stools. Patient states he does not want to go back to [facility], in case that he feels that lightheaded, dizzy, black tarry stool, ground coffee vomit he will return to ED. Patient encouraged by Dr. Nirmala Newberry for patient to be transferred but patient refuses.       Shane Cox, RN  09/09/22 0923

## 2022-09-09 NOTE — ED PROVIDER NOTES
EMERGENCY DEPARTMENT ENCOUNTER      CHIEF COMPLAINT    Chief Complaint   Patient presents with    Altered Mental Status     Brought in due to [facility] aide concern for UTI. DI Santos is a 82 y.o. male who presentsto ED from home. By car. With complaint of confusion. Onset this am.  Patient is awake and alert in ED. Patient is asymptomatic in ED. Patient had recent hospitalization at [facility]. Patient had ERCP. Patient had GI bleed. Patient was sent home couple days ago patient's hemoglobin was 8.3 prior to discharge from [facility].  Patient is currently on Eliquis atrial fibrillation. PAST MEDICAL HISTORY    Past Medical History:   Diagnosis Date    Anginal pain ([address]     CAD (coronary artery disease)     CVA (cerebral vascular accident) ([address] 05/11/2020    CVA (cerebral vascular accident) ([facility] 05/11/2020    Diverticula of colon     Hyperlipidemia     Hypertension     Kidney stone        SURGICAL HISTORY    Past Surgical History:   Procedure Laterality Date    ABDOMEN SURGERY      partial gastrectomy    ANGIOPLASTY  06/29/2016    Dr. Chinyere Mauro @ [address]  10/02/2018    Dr. Mahnaz Gilbert @ [facility] 19-- Stent x 1    APPENDECTOMY      CARDIAC CATHETERIZATION Left 12/24/2014    Dr. Perry List -2 vessel CAD-    CARDIAC CATHETERIZATION  05/04/2007    Dr Mahnaz Gilbert: 1. Normal left ventricular systolic function with an estimated ejection fraction of 70%. 2. Elevated left ventricular end-diastolic pressure following angiographic dye load. 3. Double-vessel coronary artery disease with angiographic edivence of restenosis within the intervened-upon segment in the right coronary artery and moderately severe disease just proximal     CARDIAC CATHETERIZATION  5/4/07 con't    to the stented segment in the mid left anterior descending. CARDIAC CATHETERIZATION  06/12/2008    Dr Blanco: 1.  Moderate coronary artery disease, as described, with patent stents with evidence of mild at most in-stent restenosis of the right coronary artery. 2. Normal left ventricular systolic function. CARDIAC CATHETERIZATION Left 02/18/2022    Dr. Quintin Wooten @ [facility]--Medical therapy    CARDIAC SURGERY      13 stents, aorta and CAD    CHOLECYSTECTOMY      COLECTOMY      COLONOSCOPY      COLONOSCOPY  03/20/2015    DILATATION, ESOPHAGUS      EYE SURGERY Bilateral     cataracts    HERNIA REPAIR Right     inguinal    JOINT REPLACEMENT Right 1998 and 2012     knee (3rd surgery)    JOINT REPLACEMENT Right 2012    hip    [place] ESOPHAGOGASTRODUODENOSCOPY TRANSORAL DIAGNOSTIC N/A 06/14/2017    EGD ESOPHAGOGASTRODUODENOSCOPY; photos; bx's performed by Glenys Wright MD at [address] [place] [address]  03/09/2011    Dr Jase Parry. Hyperdynamic left ventricular systolic function, estimated ejection fraction of 70%. 2. Normal left ventricular end-diastolic pressure. 3. Double-vessel coronary artery disease with angiographic disease progression involving the stented segment in the mid right coronary artery consistent with angiographic evidence of restenosis. STOMACH SURGERY      ulcer, gastric bypass due to \"bleeding ulcer\", partial colectomy due to blockage    UPPER GASTROINTESTINAL ENDOSCOPY         CURRENT MEDICATIONS    Current Outpatient Rx   Medication Sig Dispense Refill    metoprolol tartrate (LOPRESSOR) 50 MG tablet Take 50 mg by mouth daily      sertraline (ZOLOFT) 25 MG tablet take 1 tablet by mouth once daily      gabapentin (NEURONTIN) 100 MG capsule Take 1 capsule by mouth 2 times daily as needed (neuropathy) for up to 180 days.  Intended supply: 90 days 60 capsule 1    ELDERBERRY PO Take by mouth      venlafaxine (EFFEXOR) 100 MG tablet Take 1 tablet by mouth 2 times daily 60 tablet 0    prochlorperazine (COMPAZINE) 10 MG tablet Take 1 tablet by mouth every 6 hours as needed (Headache and nausea) 20 tablet 0    ibuprofen (IBU) 400 MG tablet Take 1 tablet by mouth every 6 hours as needed for Pain 20 tablet 0 nitroGLYCERIN (NITROSTAT) 0.4 MG SL tablet Place 1 tablet under the tongue every 5 minutes as needed (chest pain) 25 tablet 2    apixaban (ELIQUIS) 5 MG TABS tablet Take by mouth 2 times daily      atorvastatin (LIPITOR) 80 MG tablet Take 80 mg by mouth daily      ondansetron (ZOFRAN) 4 MG tablet Take 1 tablet by mouth every 6 hours as needed for Nausea 30 tablet 0    docusate sodium (COLACE) 100 MG capsule Take 1 capsule by mouth 3 times daily as needed for Constipation 45 capsule 0    omeprazole (PRILOSEC) 20 MG capsule Take 40 mg by mouth Daily       vitamin D (CHOLECALCIFEROL) 1000 UNIT TABS tablet Take 3,000 Units by mouth daily          ALLERGIES    Allergies   Allergen Reactions    Pcn [Penicillins]      Large red spots    Ranolazine Other (See Comments)     dizziness  Other reaction(s): Unknown  Other reaction(s): Other (See Comments), Unknown  dizziness  Other reaction(s): Unknown    Penicillamine Other (See Comments) and Rash     Other reaction(s):  Other (See Comments), Unknown       FAMILY HISTORY    Family History   Problem Relation Age of Onset    Heart Attack Father     Heart Attack Brother        SOCIAL HISTORY    Social History     Socioeconomic History    Marital status:      Spouse name: Audra Champion    Number of children: 3    Years of education: None    Highest education level: None   Tobacco Use    Smoking status: Former     Packs/day: 10.00     Types: Cigarettes     Quit date: 3/20/1967     Years since quittin.5    Smokeless tobacco: Never   Vaping Use    Vaping Use: Never used   Substance and Sexual Activity    Alcohol use: No    Drug use: No           Review of Systems:  Constitutional: Positive for generalized fatigue and episodic confusion eyes:  Denies photophobia or discharge   HENT:  Denies sore throat or ear pain   Respiratory:  Denies cough or shortness of breath   Cardiovascular:  Denies chest pain, palpitations or swelling   GI:  Denies abdominal pain, nausea, vomiting, or normal limits   CBC WITH AUTO DIFFERENTIAL - Abnormal; Notable for the following components:    RBC 2.48 (*)     Hemoglobin 6.8 (*)     Hematocrit 20.7 (*)     RDW 16.7 (*)     Platelets 926 (*)     Monocytes 15 (*)     Absolute Lymph # 0.90 (*)     All other components within normal limits   COMPREHENSIVE METABOLIC PANEL - Abnormal; Notable for the following components:    Glucose 170 (*)     Calcium 8.4 (*)     Total Protein 5.7 (*)     Albumin 3.2 (*)     All other components within normal limits   TROPONIN - Abnormal; Notable for the following components:    Troponin, High Sensitivity 29 (*)     All other components within normal limits   POCT OCCULT BLOOD STOOL NON CA SCREEN - Normal   COVID-19, RAPID   HEMOGLOBIN AND HEMATOCRIT   TYPE AND SCREEN   PREPARE RBC (CROSSMATCH)             Summation      Patient Course: Patient has anemia. Patient has GI bleed. Patient is given 1 unit of packed packed blood cells. Patient is recommended to stop Eliquis x1 week. Follow-up with Dr. Silvia Small. Patient is discussed with Qian Pozo. Transfer to [facility] is recommended and discussed with the patient. I discussed with the patient and his wife  Patient wants to go home. Patient and his wife understands the risks. Patient will be sent home to stop Eliquis x1 week. Follow-up with Dr Selene Goldsmith or Dr. Silvia Small for repeat labs recommended. Warning signs were discussed. Return to ED if worse. ED Medications administered this visit:    Medications   0.9 % sodium chloride infusion (has no administration in time range)       New Prescriptions from this visit:    New Prescriptions    No medications on file       Follow-up:  James Shaffer MD  [address]  [phone]    In 3 days        Final Impression:   1. Disorientation    2. Iron deficiency anemia, unspecified iron deficiency anemia type    3. Gastrointestinal hemorrhage, unspecified gastrointestinal hemorrhage type    4.  History of atrial fibrillation    5.  Anticoagulated               (Please note that portions of this note were completed with a voice recognition program.  Efforts were made to edit the dictations but occasionally words are mis-transcribed.)         Nai Santos MD  09/09/22 [phone]

## 2022-09-11 ENCOUNTER — HOSPITAL ENCOUNTER (INPATIENT)
Age: 87
LOS: 2 days | Discharge: NURSING, REHAB OR LONG-TERM CARE | DRG: 920 | End: 2022-09-13
Attending: EMERGENCY MEDICINE | Admitting: INTERNAL MEDICINE
Payer: MEDICARE

## 2022-09-11 DIAGNOSIS — R41.0 CONFUSION: ICD-10-CM

## 2022-09-11 DIAGNOSIS — G89.29 CHRONIC LOW BACK PAIN WITH LEFT-SIDED SCIATICA, UNSPECIFIED BACK PAIN LATERALITY: ICD-10-CM

## 2022-09-11 DIAGNOSIS — M54.32 SCIATICA OF LEFT SIDE: ICD-10-CM

## 2022-09-11 DIAGNOSIS — M15.9 GENERALIZED OSTEOARTHRITIS: ICD-10-CM

## 2022-09-11 DIAGNOSIS — M54.42 CHRONIC LOW BACK PAIN WITH LEFT-SIDED SCIATICA, UNSPECIFIED BACK PAIN LATERALITY: ICD-10-CM

## 2022-09-11 DIAGNOSIS — G89.4 CHRONIC PAIN SYNDROME: Primary | ICD-10-CM

## 2022-09-11 PROBLEM — D64.9 ACUTE ANEMIA: Status: ACTIVE | Noted: 2022-09-11

## 2022-09-11 LAB
ABO/RH: NORMAL
ABSOLUTE EOS #: 0.1 K/UL (ref 0–0.4)
ABSOLUTE LYMPH #: 1 K/UL (ref 1–4.8)
ABSOLUTE MONO #: 0.7 K/UL (ref 0–1)
ALBUMIN SERPL-MCNC: 3.2 G/DL (ref 3.5–5.2)
ALP BLD-CCNC: 91 U/L (ref 40–129)
ALT SERPL-CCNC: 22 U/L (ref 5–41)
ANION GAP SERPL CALCULATED.3IONS-SCNC: 9 MMOL/L (ref 9–17)
ANTIBODY SCREEN: NEGATIVE
AST SERPL-CCNC: 24 U/L
BASOPHILS # BLD: 0 % (ref 0–2)
BASOPHILS ABSOLUTE: 0 K/UL (ref 0–0.2)
BILIRUB SERPL-MCNC: 0.5 MG/DL (ref 0.3–1.2)
BILIRUBIN URINE: NEGATIVE
BLD PROD TYP BPU: NORMAL
BPU ID: NORMAL
BUN BLDV-MCNC: 19 MG/DL (ref 8–23)
BUN/CREAT BLD: 23 (ref 9–20)
CALCIUM SERPL-MCNC: 8.7 MG/DL (ref 8.6–10.4)
CHLORIDE BLD-SCNC: 105 MMOL/L (ref 98–107)
CO2: 23 MMOL/L (ref 20–31)
COLOR: YELLOW
COMMENT UA: NORMAL
CREAT SERPL-MCNC: 0.81 MG/DL (ref 0.7–1.2)
CROSSMATCH RESULT: NORMAL
DISPENSE STATUS BLOOD BANK: NORMAL
EOSINOPHILS RELATIVE PERCENT: 1 % (ref 0–5)
EXPIRATION DATE: NORMAL
GFR AFRICAN AMERICAN: >60 ML/MIN
GFR NON-AFRICAN AMERICAN: >60 ML/MIN
GFR SERPL CREATININE-BSD FRML MDRD: ABNORMAL ML/MIN/{1.73_M2}
GLUCOSE BLD-MCNC: 120 MG/DL (ref 70–99)
GLUCOSE URINE: NEGATIVE
HCT VFR BLD CALC: 23.4 % (ref 41–53)
HCT VFR BLD CALC: 25 % (ref 41–53)
HEMOGLOBIN: 7.6 G/DL (ref 13.5–17.5)
HEMOGLOBIN: 8.3 G/DL (ref 13.5–17.5)
KETONES, URINE: NEGATIVE
LEUKOCYTE ESTERASE, URINE: NEGATIVE
LYMPHOCYTES # BLD: 21 % (ref 13–44)
MCH RBC QN AUTO: 27.3 PG (ref 26–34)
MCHC RBC AUTO-ENTMCNC: 32.2 G/DL (ref 31–37)
MCV RBC AUTO: 84.8 FL (ref 80–100)
MONOCYTES # BLD: 14 % (ref 5–9)
NITRITE, URINE: NEGATIVE
PDW BLD-RTO: 16.5 % (ref 12.1–15.2)
PH UA: 6 (ref 5–8)
PLATELET # BLD: 418 K/UL (ref 140–450)
POTASSIUM SERPL-SCNC: 4.5 MMOL/L (ref 3.7–5.3)
PROTEIN UA: NEGATIVE
RBC # BLD: 2.77 M/UL (ref 4.5–5.9)
SARS-COV-2, RAPID: NOT DETECTED
SEG NEUTROPHILS: 64 % (ref 39–75)
SEGMENTED NEUTROPHILS ABSOLUTE COUNT: 3.2 K/UL (ref 2.1–6.5)
SODIUM BLD-SCNC: 137 MMOL/L (ref 135–144)
SPECIFIC GRAVITY UA: 1.01 (ref 1–1.03)
SPECIMEN DESCRIPTION: NORMAL
TOTAL PROTEIN: 5.9 G/DL (ref 6.4–8.3)
TRANSFUSION STATUS: NORMAL
TURBIDITY: CLEAR
UNIT DIVISION: 0
URINE HGB: NEGATIVE
UROBILINOGEN, URINE: NORMAL
WBC # BLD: 4.9 K/UL (ref 3.5–11)

## 2022-09-11 PROCEDURE — 86901 BLOOD TYPING SEROLOGIC RH(D): CPT

## 2022-09-11 PROCEDURE — 6370000000 HC RX 637 (ALT 250 FOR IP): Performed by: EMERGENCY MEDICINE

## 2022-09-11 PROCEDURE — 80053 COMPREHEN METABOLIC PANEL: CPT

## 2022-09-11 PROCEDURE — 36430 TRANSFUSION BLD/BLD COMPNT: CPT

## 2022-09-11 PROCEDURE — 36415 COLL VENOUS BLD VENIPUNCTURE: CPT

## 2022-09-11 PROCEDURE — G0378 HOSPITAL OBSERVATION PER HR: HCPCS

## 2022-09-11 PROCEDURE — 86920 COMPATIBILITY TEST SPIN: CPT

## 2022-09-11 PROCEDURE — 86850 RBC ANTIBODY SCREEN: CPT

## 2022-09-11 PROCEDURE — 99285 EMERGENCY DEPT VISIT HI MDM: CPT

## 2022-09-11 PROCEDURE — 85014 HEMATOCRIT: CPT

## 2022-09-11 PROCEDURE — 2580000003 HC RX 258: Performed by: INTERNAL MEDICINE

## 2022-09-11 PROCEDURE — 2060000000 HC ICU INTERMEDIATE R&B

## 2022-09-11 PROCEDURE — P9016 RBC LEUKOCYTES REDUCED: HCPCS

## 2022-09-11 PROCEDURE — 85025 COMPLETE CBC W/AUTO DIFF WBC: CPT

## 2022-09-11 PROCEDURE — 81003 URINALYSIS AUTO W/O SCOPE: CPT

## 2022-09-11 PROCEDURE — 85018 HEMOGLOBIN: CPT

## 2022-09-11 PROCEDURE — 6370000000 HC RX 637 (ALT 250 FOR IP): Performed by: INTERNAL MEDICINE

## 2022-09-11 PROCEDURE — 94761 N-INVAS EAR/PLS OXIMETRY MLT: CPT

## 2022-09-11 PROCEDURE — 87635 SARS-COV-2 COVID-19 AMP PRB: CPT

## 2022-09-11 PROCEDURE — C9803 HOPD COVID-19 SPEC COLLECT: HCPCS

## 2022-09-11 PROCEDURE — 86900 BLOOD TYPING SEROLOGIC ABO: CPT

## 2022-09-11 RX ORDER — ONDANSETRON 2 MG/ML
4 INJECTION INTRAMUSCULAR; INTRAVENOUS EVERY 6 HOURS PRN
Status: DISCONTINUED | OUTPATIENT
Start: 2022-09-11 | End: 2022-09-13 | Stop reason: HOSPADM

## 2022-09-11 RX ORDER — ATORVASTATIN CALCIUM 40 MG/1
80 TABLET, FILM COATED ORAL DAILY
Status: DISCONTINUED | OUTPATIENT
Start: 2022-09-11 | End: 2022-09-13 | Stop reason: HOSPADM

## 2022-09-11 RX ORDER — GABAPENTIN 600 MG/1
300 TABLET ORAL 3 TIMES DAILY
Status: DISCONTINUED | OUTPATIENT
Start: 2022-09-11 | End: 2022-09-12 | Stop reason: CLARIF

## 2022-09-11 RX ORDER — TRAMADOL HYDROCHLORIDE 50 MG/1
50 TABLET ORAL EVERY 6 HOURS PRN
Status: DISCONTINUED | OUTPATIENT
Start: 2022-09-11 | End: 2022-09-13

## 2022-09-11 RX ORDER — SODIUM CHLORIDE 9 MG/ML
INJECTION, SOLUTION INTRAVENOUS PRN
Status: DISCONTINUED | OUTPATIENT
Start: 2022-09-11 | End: 2022-09-13 | Stop reason: HOSPADM

## 2022-09-11 RX ORDER — MULTIVITAMIN WITH IRON
1 TABLET ORAL DAILY
Status: DISCONTINUED | OUTPATIENT
Start: 2022-09-11 | End: 2022-09-13 | Stop reason: HOSPADM

## 2022-09-11 RX ORDER — ACETAMINOPHEN 650 MG/1
650 SUPPOSITORY RECTAL EVERY 6 HOURS PRN
Status: DISCONTINUED | OUTPATIENT
Start: 2022-09-11 | End: 2022-09-13 | Stop reason: HOSPADM

## 2022-09-11 RX ORDER — SODIUM CHLORIDE 0.9 % (FLUSH) 0.9 %
5-40 SYRINGE (ML) INJECTION PRN
Status: DISCONTINUED | OUTPATIENT
Start: 2022-09-11 | End: 2022-09-13 | Stop reason: HOSPADM

## 2022-09-11 RX ORDER — NITROGLYCERIN 0.4 MG/1
0.4 TABLET SUBLINGUAL EVERY 5 MIN PRN
Status: DISCONTINUED | OUTPATIENT
Start: 2022-09-11 | End: 2022-09-13 | Stop reason: HOSPADM

## 2022-09-11 RX ORDER — GABAPENTIN 100 MG/1
100 CAPSULE ORAL 2 TIMES DAILY PRN
Status: DISCONTINUED | OUTPATIENT
Start: 2022-09-11 | End: 2022-09-13 | Stop reason: HOSPADM

## 2022-09-11 RX ORDER — DOCUSATE SODIUM 100 MG/1
100 CAPSULE, LIQUID FILLED ORAL 3 TIMES DAILY PRN
Status: DISCONTINUED | OUTPATIENT
Start: 2022-09-11 | End: 2022-09-13 | Stop reason: HOSPADM

## 2022-09-11 RX ORDER — HYDROCODONE BITARTRATE AND ACETAMINOPHEN 5; 325 MG/1; MG/1
1 TABLET ORAL ONCE
Status: COMPLETED | OUTPATIENT
Start: 2022-09-11 | End: 2022-09-11

## 2022-09-11 RX ORDER — SODIUM CHLORIDE 0.9 % (FLUSH) 0.9 %
5-40 SYRINGE (ML) INJECTION EVERY 12 HOURS SCHEDULED
Status: DISCONTINUED | OUTPATIENT
Start: 2022-09-11 | End: 2022-09-13 | Stop reason: HOSPADM

## 2022-09-11 RX ORDER — POLYETHYLENE GLYCOL 3350 17 G/17G
17 POWDER, FOR SOLUTION ORAL DAILY PRN
Status: DISCONTINUED | OUTPATIENT
Start: 2022-09-11 | End: 2022-09-13 | Stop reason: HOSPADM

## 2022-09-11 RX ORDER — PANTOPRAZOLE SODIUM 40 MG/1
40 TABLET, DELAYED RELEASE ORAL
Status: DISCONTINUED | OUTPATIENT
Start: 2022-09-12 | End: 2022-09-13 | Stop reason: HOSPADM

## 2022-09-11 RX ORDER — ACETAMINOPHEN 325 MG/1
650 TABLET ORAL EVERY 6 HOURS PRN
Status: DISCONTINUED | OUTPATIENT
Start: 2022-09-11 | End: 2022-09-13 | Stop reason: HOSPADM

## 2022-09-11 RX ORDER — VITAMIN B COMPLEX
3000 TABLET ORAL DAILY
Status: DISCONTINUED | OUTPATIENT
Start: 2022-09-11 | End: 2022-09-13 | Stop reason: HOSPADM

## 2022-09-11 RX ORDER — METOPROLOL TARTRATE 50 MG/1
50 TABLET, FILM COATED ORAL DAILY
Status: DISCONTINUED | OUTPATIENT
Start: 2022-09-11 | End: 2022-09-13 | Stop reason: HOSPADM

## 2022-09-11 RX ORDER — ONDANSETRON 4 MG/1
4 TABLET, ORALLY DISINTEGRATING ORAL EVERY 8 HOURS PRN
Status: DISCONTINUED | OUTPATIENT
Start: 2022-09-11 | End: 2022-09-13 | Stop reason: HOSPADM

## 2022-09-11 RX ADMIN — SODIUM CHLORIDE, PRESERVATIVE FREE 10 ML: 5 INJECTION INTRAVENOUS at 12:27

## 2022-09-11 RX ADMIN — METOPROLOL TARTRATE 50 MG: 50 TABLET, FILM COATED ORAL at 12:25

## 2022-09-11 RX ADMIN — ATORVASTATIN CALCIUM 80 MG: 40 TABLET, FILM COATED ORAL at 12:25

## 2022-09-11 RX ADMIN — TRAMADOL HYDROCHLORIDE 50 MG: 50 TABLET ORAL at 12:24

## 2022-09-11 RX ADMIN — ACETAMINOPHEN 650 MG: 325 TABLET, FILM COATED ORAL at 15:42

## 2022-09-11 RX ADMIN — CHOLECALCIFEROL TAB 25 MCG (1000 UNIT) 3000 UNITS: 25 TAB at 12:24

## 2022-09-11 RX ADMIN — HYDROCODONE BITARTRATE AND ACETAMINOPHEN 1 TABLET: 5; 325 TABLET ORAL at 08:29

## 2022-09-11 RX ADMIN — GABAPENTIN 300 MG: 600 TABLET, FILM COATED ORAL at 20:06

## 2022-09-11 RX ADMIN — THERA TABS 1 TABLET: TAB at 12:24

## 2022-09-11 RX ADMIN — VENLAFAXINE 100 MG: 25 TABLET ORAL at 12:24

## 2022-09-11 RX ADMIN — GABAPENTIN 100 MG: 100 CAPSULE ORAL at 15:41

## 2022-09-11 RX ADMIN — SODIUM CHLORIDE, PRESERVATIVE FREE 10 ML: 5 INJECTION INTRAVENOUS at 20:03

## 2022-09-11 RX ADMIN — GABAPENTIN 300 MG: 600 TABLET, FILM COATED ORAL at 12:25

## 2022-09-11 RX ADMIN — SERTRALINE HYDROCHLORIDE 25 MG: 50 TABLET ORAL at 12:24

## 2022-09-11 RX ADMIN — VENLAFAXINE 100 MG: 25 TABLET ORAL at 20:03

## 2022-09-11 ASSESSMENT — PAIN - FUNCTIONAL ASSESSMENT
PAIN_FUNCTIONAL_ASSESSMENT: 0-10
PAIN_FUNCTIONAL_ASSESSMENT: ACTIVITIES ARE NOT PREVENTED
PAIN_FUNCTIONAL_ASSESSMENT: ACTIVITIES ARE NOT PREVENTED
PAIN_FUNCTIONAL_ASSESSMENT: 0-10
PAIN_FUNCTIONAL_ASSESSMENT: ACTIVITIES ARE NOT PREVENTED

## 2022-09-11 ASSESSMENT — ENCOUNTER SYMPTOMS
EYE REDNESS: 0
BACK PAIN: 1
COLOR CHANGE: 0
ABDOMINAL PAIN: 0
DIARRHEA: 0
SHORTNESS OF BREATH: 0
TROUBLE SWALLOWING: 0
SORE THROAT: 0
COUGH: 0
VOMITING: 0
NAUSEA: 0
EYE PAIN: 0

## 2022-09-11 ASSESSMENT — PAIN SCALES - GENERAL
PAINLEVEL_OUTOF10: 9
PAINLEVEL_OUTOF10: 10
PAINLEVEL_OUTOF10: 2
PAINLEVEL_OUTOF10: 10
PAINLEVEL_OUTOF10: 8
PAINLEVEL_OUTOF10: 8
PAINLEVEL_OUTOF10: 9
PAINLEVEL_OUTOF10: 10
PAINLEVEL_OUTOF10: 8
PAINLEVEL_OUTOF10: 9

## 2022-09-11 ASSESSMENT — PAIN DESCRIPTION - LOCATION
LOCATION: LEG
LOCATION: GROIN;LEG
LOCATION: LEG
LOCATION: LEG
LOCATION: GROIN;LEG
LOCATION: LEG
LOCATION: GROIN;LEG

## 2022-09-11 ASSESSMENT — PAIN DESCRIPTION - FREQUENCY
FREQUENCY: CONTINUOUS

## 2022-09-11 ASSESSMENT — PAIN DESCRIPTION - ORIENTATION
ORIENTATION: LEFT
ORIENTATION: LEFT;RIGHT
ORIENTATION: LEFT
ORIENTATION: LEFT;RIGHT
ORIENTATION: LEFT
ORIENTATION: LEFT

## 2022-09-11 ASSESSMENT — PAIN DESCRIPTION - DESCRIPTORS
DESCRIPTORS: ACHING
DESCRIPTORS: BURNING;SHOOTING;SPASM
DESCRIPTORS: STABBING
DESCRIPTORS: STABBING
DESCRIPTORS: ACHING
DESCRIPTORS: ACHING

## 2022-09-11 ASSESSMENT — PAIN DESCRIPTION - ONSET
ONSET: ON-GOING
ONSET: ON-GOING

## 2022-09-11 ASSESSMENT — PAIN DESCRIPTION - PAIN TYPE
TYPE: ACUTE PAIN;CHRONIC PAIN

## 2022-09-11 NOTE — H&P
History & Physical    Patient:  Harper Cam  YOB: 1935  Date of Service: 9/11/2022  MRN: [id number]   Acct:   [de-identified]   Primary Care Physician: Evelio Gonzalez MD    Chief Complaint:   Chief Complaint   Patient presents with    Muscle Pain     Pt states he has left leg and left groin pain that has been a problem for a long time - pt arrives via [facility]        History of Present Illness: The patient is a 82 y.o. male who presented to the emergency room by EMS for evaluation of severe pain in the left lower back/groin area started last night. Patient states its sharp, 9-10/10 in intensity, radiating to entire left lower extremity down to his foot. The pain is worse when he moves and somewhat better when he is still. He has a history of chronic pain in his left groin and leg area. Also has a history of idiopathic neuropathy. States he used to be on gabapentin 400 mg at nighttime but for some reason it was discontinued. Most recently he was taking gabapentin 100 mg twice daily. Patient states that when he went home from ER 2 days ago for some reason he stopped taking it thinking it was contributing to his bleeding. Patient reports no swelling of the left leg, no significant numbness. He has no other complaints today such as chest pain, shortness of breath, cough, nausea or vomiting. Patient was recently hospitalized at [facility] from 8/23/2022 to 9/3/2022 due to acute cholangitis, transaminitis, E. coli bacteremia with septic shock requiring ICU admission. He underwent ERCP with sphincterotomy and balloon extraction. Had postop bleed and declined repeat ERCP. Was discharged home with Eliquis that he takes for history of atrial fibrillation. Presented to the emergency room on 9 /9 complaining of altered mental status, hypotension. Vitals were stable in ER and his mental status was at baseline. He was noted to have low hemoglobin of 6.8.   Was transfused 1 units of PRBC and recommended to stop Eliquis and follow-up with Dr. Edie Anders. Patient was also recommended transfer to [facility] for evaluation, however he declined. Patient's work-up in the emergency room today revealed stable vitals. BMP was unremarkable. LFTs also unremarkable with normal transaminases and alk phos level. CBC revealed WBC 4.9. Hemoglobin is low 7.6. Hematocrit 23.4. COVID-19 was negative. Patient had difficulty ambulating in the emergency room and was admitted for pain control as well as addressing ongoing anemia. The patient and his family also expressed their interest in ECF placement for rehab. Past Medical History:        Diagnosis Date    Anginal pain ([address]     CAD (coronary artery disease)     CVA (cerebral vascular accident) ([address] 05/11/2020    CVA (cerebral vascular accident) ([address] 05/11/2020    Diverticula of colon     Hyperlipidemia     Hypertension     Kidney stone        Past Surgical History:        Procedure Laterality Date    ABDOMEN SURGERY      partial gastrectomy    ANGIOPLASTY  06/29/2016    Dr. Edie Anders @ [address]  10/02/2018    Dr. Carlos Joseph @ [facility] 19-- Stent x 1    APPENDECTOMY      CARDIAC CATHETERIZATION Left 12/24/2014    Dr. Cobb Vandergrift -2 vessel CAD-    CARDIAC CATHETERIZATION  05/04/2007    Dr Carlos Joseph: 1. Normal left ventricular systolic function with an estimated ejection fraction of 70%. 2. Elevated left ventricular end-diastolic pressure following angiographic dye load. 3. Double-vessel coronary artery disease with angiographic edivence of restenosis within the intervened-upon segment in the right coronary artery and moderately severe disease just proximal     CARDIAC CATHETERIZATION  5/4/07 con't    to the stented segment in the mid left anterior descending. CARDIAC CATHETERIZATION  06/12/2008    Dr Blanco: 1.  Moderate coronary artery disease, as described, with patent stents with evidence of mild at most in-stent restenosis of the right coronary artery. 2. Normal left ventricular systolic function. CARDIAC CATHETERIZATION Left 02/18/2022    Dr. Batres Im @ [facility]--Medical therapy    CARDIAC SURGERY      13 stents, aorta and CAD    CHOLECYSTECTOMY      COLECTOMY      COLONOSCOPY      COLONOSCOPY  03/20/2015    DILATATION, ESOPHAGUS      EYE SURGERY Bilateral     cataracts    HERNIA REPAIR Right     inguinal    JOINT REPLACEMENT Right 1998 and 2012     knee (3rd surgery)    JOINT REPLACEMENT Right 2012    hip    [place] ESOPHAGOGASTRODUODENOSCOPY TRANSORAL DIAGNOSTIC N/A 06/14/2017    EGD ESOPHAGOGASTRODUODENOSCOPY; photos; bx's performed by Diana Rivas MD at [address] [place] [address]  03/09/2011    Dr Calos Connelly. Hyperdynamic left ventricular systolic function, estimated ejection fraction of 70%. 2. Normal left ventricular end-diastolic pressure. 3. Double-vessel coronary artery disease with angiographic disease progression involving the stented segment in the mid right coronary artery consistent with angiographic evidence of restenosis. STOMACH SURGERY      ulcer, gastric bypass due to \"bleeding ulcer\", partial colectomy due to blockage    UPPER GASTROINTESTINAL ENDOSCOPY         Home Medications:   No current facility-administered medications on file prior to encounter. Current Outpatient Medications on File Prior to Encounter   Medication Sig Dispense Refill    metoprolol tartrate (LOPRESSOR) 50 MG tablet Take 50 mg by mouth daily      sertraline (ZOLOFT) 25 MG tablet take 1 tablet by mouth once daily      gabapentin (NEURONTIN) 100 MG capsule Take 1 capsule by mouth 2 times daily as needed (neuropathy) for up to 180 days.  Intended supply: 90 days 60 capsule 1    ELDERBERRY PO Take by mouth      venlafaxine (EFFEXOR) 100 MG tablet Take 1 tablet by mouth 2 times daily 60 tablet 0    ibuprofen (IBU) 400 MG tablet Take 1 tablet by mouth every 6 hours as needed for Pain 20 tablet 0    nitroGLYCERIN (NITROSTAT) 0.4 MG SL tablet Place 1 129/75   Pulse: 75   Resp: 14   Temp:    SpO2: 96%      BMI: Body mass index is 23.68 kg/m². Physical Exam:  General Appearance: alert and oriented to person, place and time, in no acute distress  Cardiovascular: normal rate, regular rhythm, normal S1 and S2, no murmurs  Pulmonary/Chest: clear to auscultation bilaterally- no wheezes, rales or rhonchi, normal air movement, no respiratory distress  Abdomen: soft, non-tender, non-distended, normal bowel sounds, no masses Extremities: no cyanosis, clubbing or edema, pedal pulses 2+. Patient has tenderness in the left groin area and left hip area as well as left lower leg. Skin: warm and dry, no rash   Head: normocephalic and atraumatic  Eyes: pupils equal, round, and reactive to light  Neck: supple and non-tender without mass, no thyromegaly   Musculoskeletal: Limited range of motion in the left lower extremity hip area due to the pain. Neurological: alert, oriented, normal speech, no focal findings or movement disorder noted    Review of Labs and Diagnostic Testing:    Recent Results (from the past 24 hour(s))   Urinalysis    Collection Time: 09/11/22  9:40 AM   Result Value Ref Range    Color, UA Yellow Yellow    Turbidity UA Clear Clear    Glucose, Ur NEGATIVE NEGATIVE    Bilirubin Urine NEGATIVE NEGATIVE    Ketones, Urine NEGATIVE NEGATIVE    Specific Gravity, UA 1.015 1.005 - 1.030    Urine Hgb NEGATIVE NEGATIVE    pH, UA 6.0 5.0 - 8.0    Protein, UA NEGATIVE NEGATIVE    Urobilinogen, Urine Normal Normal    Nitrite, Urine NEGATIVE NEGATIVE    Leukocyte Esterase, Urine NEGATIVE NEGATIVE    Urinalysis Comments         COVID-19, Rapid    Collection Time: 09/11/22 10:00 AM    Specimen: Nasopharyngeal Swab   Result Value Ref Range    Specimen Description . NASOPHARYNGEAL SWAB     SARS-CoV-2, Rapid Not Detected Not Detected   CBC with Auto Differential    Collection Time: 09/11/22 10:06 AM   Result Value Ref Range    WBC 4.9 3.5 - 11.0 k/uL    RBC 2.77 (L) 4.5 - 5.9 m/uL    Hemoglobin 7.6 (LL) 13.5 - 17.5 g/dL    Hematocrit 23.4 (L) 41 - 53 %    MCV 84.8 80 - 100 fL    MCH 27.3 26 - 34 pg    MCHC 32.2 31 - 37 g/dL    RDW 16.5 (H) 12.1 - 15.2 %    Platelets 143 548 - 209 k/uL    Seg Neutrophils 64 39 - 75 %    Lymphocytes 21 13 - 44 %    Monocytes 14 (H) 5 - 9 %    Eosinophils % 1 0 - 5 %    Basophils 0 0 - 2 %    Segs Absolute 3.20 2.1 - 6.5 k/uL    Absolute Lymph # 1.00 1.0 - 4.8 k/uL    Absolute Mono # 0.70 0.0 - 1.0 k/uL    Absolute Eos # 0.10 0.0 - 0.4 k/uL    Basophils Absolute 0.00 0.0 - 0.2 k/uL   Comprehensive Metabolic Panel    Collection Time: 09/11/22 10:06 AM   Result Value Ref Range    Glucose 120 (H) 70 - 99 mg/dL    BUN 19 8 - 23 mg/dL    Creatinine 0.81 0.70 - 1.20 mg/dL    Bun/Cre Ratio 23 (H) 9 - 20    Calcium 8.7 8.6 - 10.4 mg/dL    Sodium 137 135 - 144 mmol/L    Potassium 4.5 3.7 - 5.3 mmol/L    Chloride 105 98 - 107 mmol/L    CO2 23 20 - 31 mmol/L    Anion Gap 9 9 - 17 mmol/L    Alkaline Phosphatase 91 40 - 129 U/L    ALT 22 5 - 41 U/L    AST 24 <40 U/L    Total Bilirubin 0.5 0.30 - 1.20 mg/dL    Total Protein 5.9 (L) 6.4 - 8.3 g/dL    Albumin 3.2 (L) 3.5 - 5.2 g/dL    GFR Non-African American >60 >60 mL/min    GFR African American >60 >60 mL/min    GFR Comment                Assessment/ Plan:      Acute on chronic low back pain with left-sided sciatica -patient admitted for pain management. Prescribed tramadol for pain control. We will resume gabapentin. Will consult PT and OT. Patient generally deconditioned and will benefit from re[facility] at [facility]. Will consult  to help with arrangements. Acute anemia -due to GI bleed. S/p 1 unit of PRBC on 9/9 as outpatient. H&H continues to decline despite holding Eliquis. Will transfuse 1 more unit of PRBC today due to potential ongoing bleeding.   Generalized weakness -consult PT and OT  S/p ERCP on 8/24/2022 with choledocholithiasis, s/p sphincterotomy and balloon extraction at [facility], complicated by postop ERCP bleeding with patient refusing repeat ERCP. Recent cholangitis, E. coli bacteremia with septic shock with ICU admission at [facility], completed 7-day antibiotic course with end date 8/30/2022  History of atrial fibrillation -patient's Eliquis discontinued due to GI bleed. Heart rate controlled on metoprolol  CAD, s/p CABG x3  multiple stents. Continue medical management. Unfortunately we have to hold his aspirin due to active bleeding  Idiopathic progressive polyneuropathy -resume gabapentin  Depression -continue on Effexor and Zoloft    CODE STATUS discussed with the patient. He requested to be DNR CCA and states in the past he has signed DNR forms. He declined CPR and intubation in the event of cardiopulmonary arrest.  His wife was also present during the discussion. Advanced Care Plan    ( x)  I confirmed that the patient's Advanced Care Plan is present, code status documented, or surrogate decision maker is listed in the patient's medical record. ( )  The patient's advanced care plan is not present because:  (select)   ( ) I confirmed today that the patient does not wish or was not able to name a surrogate decision maker or provide an [address] ( ) Hospice care is currently being provided or has been provided this calender year. ( )  I did not confirm today the presence of an [address] or surrogate decision maker documented within the patient's medical record. (Does not satisfy MIPS performance). Documentation of Current Medications in the Medical Record    ( x)  I have utilized all available immediate resources to obtain, update, or review the patient's current medications. If Yes, Stop Here  ( ) The patient is not eligible for medications reconciliation; the patient is in an emergent medical situation where delaying treatment would jeopardize the patient's health.   ( ) I did not confirm, update or review the patient's current list of medications today. (does not satisfy MIPS performance)        Medical Necessity: Inpatient admission is appropriate for this patient secondary to the need of  . Estimated length of stay:  days. The beneficiary may reasonably be expected to be discharged or transferred to a hospital within 96 hours after admission.     DVT prophylaxis:   [] Lovenox   [x] SCDs   [] SQ Heparin   [] Encourage ambulation, low risk for DVT, no chemical or mechanical    prophylaxis necessary      [] Already on Anticoagulation    Anticipated Disposition upon discharge:   [] Home   [] Home with Home Health   [x] [facility]   [] [address]      Electronically signed by Federico Warner MD on 9/11/2022 at 10:48 AM

## 2022-09-11 NOTE — ED NOTES
Pt ambulates in room with use of walker and standby assist. Pt moves slowly and reports pain to left leg.       Doris Baron RN  09/11/22 5000

## 2022-09-11 NOTE — ED PROVIDER NOTES
[facility] ED  eMERGENCY dEPARTMENT eNCOUnter      Pt Name: Tamica Garcia  MRN: [id number]  Nabilgfotoniel 1935  Date of evaluation: 9/11/2022  Provider: Paige Laboy MD    CHIEF COMPLAINT       Chief Complaint   Patient presents with    Muscle Pain     Pt states he has left leg and left groin pain that has been a problem for a long time - pt arrives via [facility]      Patient is an 61-year-old male who presents to the emergency department complaining of left leg and sciatic pain. Patient states that he has had this for quite some time but it seems to be worse over the past day. He states last night it was difficult sleeping because of it. He denies any fever or chills. He denies any chest pain or shortness of breath. He states that the pain is constant. Patient has not had any other associated symptoms. Nursing Notes were reviewed. REVIEW OF SYSTEMS    (2-9 systems for level 4, 10 or more for level 5)     Review of Systems   Constitutional:  Negative for chills and fever. HENT:  Negative for ear pain, sore throat and trouble swallowing. Eyes:  Negative for pain and redness. Respiratory:  Negative for cough and shortness of breath. Cardiovascular:  Negative for chest pain and palpitations. Gastrointestinal:  Negative for abdominal pain, diarrhea, nausea and vomiting. Genitourinary:  Negative for dysuria and frequency. Musculoskeletal:  Positive for back pain. Negative for neck pain. Skin:  Negative for color change and rash. Neurological:  Negative for dizziness, syncope and headaches. Psychiatric/Behavioral:  Positive for confusion. Negative for hallucinations and suicidal ideas. Except as noted above the remainder of the review of systems was reviewed and negative.        PAST MEDICAL HISTORY     Past Medical History:   Diagnosis Date    Anginal pain ([address]     CAD (coronary artery disease)     CVA (cerebral vascular accident) ([address] 05/11/2020    CVA (cerebral vascular accident) ([facility] 05/11/2020    Diverticula of colon     Hyperlipidemia     Hypertension     Kidney stone          SURGICAL HISTORY       Past Surgical History:   Procedure Laterality Date    ABDOMEN SURGERY      partial gastrectomy    ANGIOPLASTY  06/29/2016    Dr. Yvonne Rincon @ [address]  10/02/2018    Dr. Keiry Romero @ [facility]-- Stent x 1    APPENDECTOMY      CARDIAC CATHETERIZATION Left 12/24/2014    Dr. Mario Alberto Schreiber -2 vessel CAD-    CARDIAC CATHETERIZATION  05/04/2007    Dr Keiry Romero: 1. Normal left ventricular systolic function with an estimated ejection fraction of 70%. 2. Elevated left ventricular end-diastolic pressure following angiographic dye load. 3. Double-vessel coronary artery disease with angiographic edivence of restenosis within the intervened-upon segment in the right coronary artery and moderately severe disease just proximal     CARDIAC CATHETERIZATION  5/4/07 con't    to the stented segment in the mid left anterior descending. CARDIAC CATHETERIZATION  06/12/2008    Dr Blanco: 1. Moderate coronary artery disease, as described, with patent stents with evidence of mild at most in-stent restenosis of the right coronary artery. 2. Normal left ventricular systolic function. CARDIAC CATHETERIZATION Left 02/18/2022    Dr. Yvonne Rincon @ [facility]--Medical therapy    CARDIAC SURGERY      13 stents, aorta and CAD    CHOLECYSTECTOMY      COLECTOMY      COLONOSCOPY      COLONOSCOPY  03/20/2015    DILATATION, ESOPHAGUS      EYE SURGERY Bilateral     cataracts    HERNIA REPAIR Right     inguinal    JOINT REPLACEMENT Right 1998 and 2012     knee (3rd surgery)    JOINT REPLACEMENT Right 2012    hip    [place] ESOPHAGOGASTRODUODENOSCOPY TRANSORAL DIAGNOSTIC N/A 06/14/2017    EGD ESOPHAGOGASTRODUODENOSCOPY; photos; bx's performed by Adry Gallagher MD at [address] [place] [address]  03/09/2011    Dr Linda Monteiro. Hyperdynamic left ventricular systolic function, estimated ejection fraction of 70%.  2. Normal left ventricular end-diastolic pressure. 3. Double-vessel coronary artery disease with angiographic disease progression involving the stented segment in the mid right coronary artery consistent with angiographic evidence of restenosis. STOMACH SURGERY      ulcer, gastric bypass due to \"bleeding ulcer\", partial colectomy due to blockage    UPPER GASTROINTESTINAL ENDOSCOPY           ALLERGIES     Pcn [penicillins], Ranolazine, and Penicillamine    FAMILY HISTORY       Family History   Problem Relation Age of Onset    Heart Attack Father     Heart Attack Brother           SOCIAL HISTORY       Social History     Socioeconomic History    Marital status:      Spouse name: Zo Rodriguez    Number of children: 3    Years of education: None    Highest education level: None   Tobacco Use    Smoking status: Former     Packs/day: 10.00     Types: Cigarettes     Quit date: 3/20/1967     Years since quittin.5    Smokeless tobacco: Never   Vaping Use    Vaping Use: Never used   Substance and Sexual Activity    Alcohol use: No    Drug use: No           PHYSICAL EXAM    (up to 7 for level 4, 8 ormore for level 5)     ED Triage Vitals   BP Temp Temp Source Heart Rate Resp SpO2 Height Weight   22 0801 22 0802 22 0801 22 0801 22 0801 22 0801 22 0801 22 0801   (!) 101/41 98.3 °F (36.8 °C) Oral 85 18 100 % 5' 10\" (1.778 m) 165 lb (74.8 kg)       Physical Exam    Physical    Vital signs and nursing notes were reviewed as well as the social, family, and past medical history. Gen. appearance: Patient is alert and oriented and in no acute distress    Head: Atraumatic, normocephalic    Neck: Supple, trachea/thyroid normal    EENT: PERRLA, EOMI, conjunctiva normal.    Skin: Warm and dry with no rash    Cardiovascular: Heart RRR, no gallops or rubs, no aortic enlargement or bruits noted. Respiratory: Lungs clear, no wheezing, no rales, normal breath sounds.     Gastrointestinal: Abdomen to edit the dictations but occasionally words are mis-transcribed.)    Neda Ganser, MD(electronically signed)  Attending Emergency Physician            Neda Ganser, MD  09/11/22 4916

## 2022-09-11 NOTE — PLAN OF CARE
Problem: Discharge Planning  Goal: Discharge to home or other facility with appropriate resources  Outcome: Progressing  Flowsheets (Taken 9/11/2022 1149)  Discharge to home or other facility with appropriate resources:   Identify barriers to discharge with patient and caregiver   Identify discharge learning needs (meds, wound care, etc)   Refer to discharge planning if patient needs post-hospital services based on physician order or complex needs related to functional status, cognitive ability or social support system   Arrange for needed discharge resources and transportation as appropriate   Arrange for interpreters to assist at discharge as needed     Problem: Pain  Goal: Verbalizes/displays adequate comfort level or baseline comfort level  Outcome: Progressing     Problem: Skin/Tissue Integrity  Goal: Absence of new skin breakdown  Description: 1. Monitor for areas of redness and/or skin breakdown  2. Assess vascular access sites hourly  3. Every 4-6 hours minimum:  Change oxygen saturation probe site  4. Every 4-6 hours:  If on nasal continuous positive airway pressure, respiratory therapy assess nares and determine need for appliance change or resting period.   Outcome: Progressing     Problem: Safety - Adult  Goal: Free from fall injury  Outcome: Progressing     Problem: ABCDS Injury Assessment  Goal: Absence of physical injury  Outcome: Progressing     Problem: Skin/Tissue Integrity - Adult  Goal: Skin integrity remains intact  Outcome: Progressing  Goal: Incisions, wounds, or drain sites healing without S/S of infection  Outcome: Progressing  Goal: Oral mucous membranes remain intact  Outcome: Progressing     Problem: Musculoskeletal - Adult  Goal: Return mobility to safest level of function  Outcome: Progressing  Goal: Maintain proper alignment of affected body part  Outcome: Progressing

## 2022-09-11 NOTE — ED TRIAGE NOTES
Pt arrives via [facility] with c/o left leg and left groin pain. Pt states this pain is chronic but much worse today. Pt is alert and oriented x3. Home meds reviewed with patients verbal recall. Pt states Eliquis is on hold at this time due to blood in stools 2 days ago. Pt is shaking and states it is new for him and related to the pain. Warm blankets provided. Son at bedside. Dr. Marylee Liter at bedside. Pt denies needs. Call light in reach.

## 2022-09-12 PROBLEM — D64.9 ANEMIA: Status: ACTIVE | Noted: 2022-09-12

## 2022-09-12 PROBLEM — E44.1 MILD MALNUTRITION (HCC): Status: ACTIVE | Noted: 2022-09-12

## 2022-09-12 LAB
ABO/RH: NORMAL
ABSOLUTE EOS #: 0.1 K/UL (ref 0–0.4)
ABSOLUTE LYMPH #: 1 K/UL (ref 1–4.8)
ABSOLUTE MONO #: 0.7 K/UL (ref 0–1)
ANTIBODY SCREEN: NEGATIVE
BASOPHILS # BLD: 1 % (ref 0–2)
BASOPHILS ABSOLUTE: 0 K/UL (ref 0–0.2)
BLD PROD TYP BPU: NORMAL
BLOOD BANK BLOOD PRODUCT EXPIRATION DATE: NORMAL
BLOOD BANK ISBT PRODUCT BLOOD TYPE: 5100
BLOOD BANK PRODUCT CODE: NORMAL
BLOOD BANK UNIT TYPE AND RH: NORMAL
BPU ID: NORMAL
CROSSMATCH RESULT: NORMAL
DIFFERENTIAL TYPE: YES
DISPENSE STATUS BLOOD BANK: NORMAL
EOSINOPHILS RELATIVE PERCENT: 2 % (ref 0–5)
EXPIRATION DATE: NORMAL
HCT VFR BLD CALC: 26.7 % (ref 41–53)
HEMOGLOBIN: 8.9 G/DL (ref 13.5–17.5)
LYMPHOCYTES # BLD: 23 % (ref 13–44)
MCH RBC QN AUTO: 28 PG (ref 26–34)
MCHC RBC AUTO-ENTMCNC: 33.5 G/DL (ref 31–37)
MCV RBC AUTO: 83.8 FL (ref 80–100)
MONOCYTES # BLD: 16 % (ref 5–9)
PDW BLD-RTO: 15.7 % (ref 12.1–15.2)
PLATELET # BLD: 415 K/UL (ref 140–450)
RBC # BLD: 3.19 M/UL (ref 4.5–5.9)
SEG NEUTROPHILS: 58 % (ref 39–75)
SEGMENTED NEUTROPHILS ABSOLUTE COUNT: 2.6 K/UL (ref 2.1–6.5)
TRANSFUSION STATUS: NORMAL
UNIT DIVISION: 0
UNIT ISSUE DATE/TIME: NORMAL
WBC # BLD: 4.5 K/UL (ref 3.5–11)

## 2022-09-12 PROCEDURE — 6370000000 HC RX 637 (ALT 250 FOR IP): Performed by: INTERNAL MEDICINE

## 2022-09-12 PROCEDURE — 2700000000 HC OXYGEN THERAPY PER DAY

## 2022-09-12 PROCEDURE — 97166 OT EVAL MOD COMPLEX 45 MIN: CPT

## 2022-09-12 PROCEDURE — 94761 N-INVAS EAR/PLS OXIMETRY MLT: CPT

## 2022-09-12 PROCEDURE — 97162 PT EVAL MOD COMPLEX 30 MIN: CPT

## 2022-09-12 PROCEDURE — 2580000003 HC RX 258: Performed by: INTERNAL MEDICINE

## 2022-09-12 PROCEDURE — 85025 COMPLETE CBC W/AUTO DIFF WBC: CPT

## 2022-09-12 PROCEDURE — 1200000000 HC SEMI PRIVATE

## 2022-09-12 PROCEDURE — 36415 COLL VENOUS BLD VENIPUNCTURE: CPT

## 2022-09-12 RX ORDER — FERROUS SULFATE 325(65) MG
325 TABLET ORAL 2 TIMES DAILY WITH MEALS
Status: DISCONTINUED | OUTPATIENT
Start: 2022-09-12 | End: 2022-09-13 | Stop reason: HOSPADM

## 2022-09-12 RX ORDER — GABAPENTIN 300 MG/1
300 CAPSULE ORAL 3 TIMES DAILY
Status: DISCONTINUED | OUTPATIENT
Start: 2022-09-12 | End: 2022-09-13 | Stop reason: HOSPADM

## 2022-09-12 RX ADMIN — GABAPENTIN 300 MG: 600 TABLET, FILM COATED ORAL at 08:17

## 2022-09-12 RX ADMIN — FERROUS SULFATE TAB 325 MG (65 MG ELEMENTAL FE) 325 MG: 325 (65 FE) TAB at 08:24

## 2022-09-12 RX ADMIN — PANTOPRAZOLE SODIUM 40 MG: 40 TABLET, DELAYED RELEASE ORAL at 05:23

## 2022-09-12 RX ADMIN — GABAPENTIN 300 MG: 300 CAPSULE ORAL at 13:53

## 2022-09-12 RX ADMIN — VENLAFAXINE 100 MG: 25 TABLET ORAL at 08:17

## 2022-09-12 RX ADMIN — ATORVASTATIN CALCIUM 80 MG: 40 TABLET, FILM COATED ORAL at 08:17

## 2022-09-12 RX ADMIN — SODIUM CHLORIDE, PRESERVATIVE FREE 10 ML: 5 INJECTION INTRAVENOUS at 20:38

## 2022-09-12 RX ADMIN — FERROUS SULFATE TAB 325 MG (65 MG ELEMENTAL FE) 325 MG: 325 (65 FE) TAB at 17:08

## 2022-09-12 RX ADMIN — SODIUM CHLORIDE, PRESERVATIVE FREE 10 ML: 5 INJECTION INTRAVENOUS at 08:17

## 2022-09-12 RX ADMIN — METOPROLOL TARTRATE 50 MG: 50 TABLET, FILM COATED ORAL at 08:18

## 2022-09-12 RX ADMIN — VENLAFAXINE 100 MG: 25 TABLET ORAL at 20:37

## 2022-09-12 RX ADMIN — SERTRALINE HYDROCHLORIDE 25 MG: 50 TABLET ORAL at 08:17

## 2022-09-12 RX ADMIN — THERA TABS 1 TABLET: TAB at 08:18

## 2022-09-12 RX ADMIN — TRAMADOL HYDROCHLORIDE 50 MG: 50 TABLET ORAL at 08:24

## 2022-09-12 RX ADMIN — GABAPENTIN 300 MG: 300 CAPSULE ORAL at 20:37

## 2022-09-12 RX ADMIN — CHOLECALCIFEROL TAB 25 MCG (1000 UNIT) 3000 UNITS: 25 TAB at 08:17

## 2022-09-12 ASSESSMENT — PAIN DESCRIPTION - ORIENTATION: ORIENTATION: RIGHT;LEFT

## 2022-09-12 ASSESSMENT — PAIN DESCRIPTION - DESCRIPTORS: DESCRIPTORS: ACHING

## 2022-09-12 ASSESSMENT — PAIN DESCRIPTION - PAIN TYPE: TYPE: NEUROPATHIC PAIN

## 2022-09-12 ASSESSMENT — PAIN SCALES - GENERAL
PAINLEVEL_OUTOF10: 7
PAINLEVEL_OUTOF10: 7
PAINLEVEL_OUTOF10: 0

## 2022-09-12 ASSESSMENT — PAIN DESCRIPTION - LOCATION: LOCATION: LEG

## 2022-09-12 ASSESSMENT — PAIN DESCRIPTION - FREQUENCY: FREQUENCY: CONTINUOUS

## 2022-09-12 NOTE — PROGRESS NOTES
Quality flow rounds held on 9/12/22     Abdi Greer is admitted for  Chronic low back pain with left-sided sciatica, unspecified back pain laterality. Length of stay 1. Education:    Needed Education: anemia, diet, follow up, meds      Do you have any questions regarding your plan of care while at the hospital? denies    Planned Disposition:               []  Home when able                [] Swing Bed                [x] ECF[facility]               [] Other/TBD    Barriers to Discharge:    Can you afford your medications? yes   Do you have transportation to follow up appointments? Drives self or wife drives   Do you need any new equipment at home? denies   Current equipment includes   walker, grab bars, shower chair. Wife states home is handicap accessible. Do you have a living will or durable power of  for healthcare? yes               If yes do we have a copy on file? yes    Do you or your family have any questions or concerns we haven't already discussed? Denies    Lives with wife. Plan is be discharged to [address] for skilled care. PCP is Dr. Bonilla Barker. [facility] prior to admission.

## 2022-09-12 NOTE — PLAN OF CARE
complete a commode transfer while using DME PRN with S/U. Short Term Goal 4: Patient will complete toileting tasks while using adaptive equipment/techniques PRN with SUP. Short Term Goal 5: Patient will improve dynamic standing balance to F+ to increase independence with personal hygiene/grooming tasks. Long Term Goals  Time Frame for Long term goals : STG=LTG    Upon Swingbed Transfer this Plan of Care will be followed until revision is completed.     Ayush Fernandez, OTR/L                    Date: 9/12/2022

## 2022-09-12 NOTE — PROGRESS NOTES
[facility]  Physical Therapy  Evaluation  Date: 2022  Patient Name: Boom Gupta        MRN: [id number]    : 1935  (82 y.o.)  Gender: male   Referring Practitioner: Dr. Juve Leyva  Diagnosis: Acute on chronic lower back pain with left-sided sciatica  Additional Pertinent Hx: Admitted to [facility] on 22 due to left lower back/groin pain. Diagnosed with acute on chronic lower back pain with left-sided sciatica, acute anemia and generalized weakness   Past Medical History:   Diagnosis Date    Anginal pain ([address]     CAD (coronary artery disease)     CVA (cerebral vascular accident) ([address] 2020    CVA (cerebral vascular accident) ([address] 2020    Diverticula of colon     Hyperlipidemia     Hypertension     Kidney stone     Macular degeneration      Past Surgical History:   Procedure Laterality Date    ABDOMEN SURGERY      partial gastrectomy    ANGIOPLASTY  2016    Dr. Domonique Ruvalcaba @ [address]  10/02/2018    Dr. Tejal Dyer @ [facility]-- Stent x 1    APPENDECTOMY      CARDIAC CATHETERIZATION Left 2014    Dr. Zoey Maier -2 vessel CAD-    CARDIAC CATHETERIZATION  2007    Dr Tejal Dyer: 1. Normal left ventricular systolic function with an estimated ejection fraction of 70%. 2. Elevated left ventricular end-diastolic pressure following angiographic dye load. 3. Double-vessel coronary artery disease with angiographic edivence of restenosis within the intervened-upon segment in the right coronary artery and moderately severe disease just proximal     CARDIAC CATHETERIZATION  07 con't    to the stented segment in the mid left anterior descending. CARDIAC CATHETERIZATION  2008    Dr Blanco: 1. Moderate coronary artery disease, as described, with patent stents with evidence of mild at most in-stent restenosis of the right coronary artery. 2. Normal left ventricular systolic function.     CARDIAC CATHETERIZATION Left 2022    Dr. Domonique Ruvalcaba @ [facility]--Medical therapy    CARDIAC SURGERY      13 stents, aorta and CAD    CHOLECYSTECTOMY      COLECTOMY      COLONOSCOPY      COLONOSCOPY  03/20/2015    DILATATION, ESOPHAGUS      EYE SURGERY Bilateral     cataracts    HERNIA REPAIR Right     inguinal    JOINT REPLACEMENT Right 1998 and 2012     knee (3rd surgery)    JOINT REPLACEMENT Right 2012    hip    [place] ESOPHAGOGASTRODUODENOSCOPY TRANSORAL DIAGNOSTIC N/A 06/14/2017    EGD ESOPHAGOGASTRODUODENOSCOPY; photos; bx's performed by Lm Guan MD at [address] [place] [address]  03/09/2011    Dr Dianelys Connor. Hyperdynamic left ventricular systolic function, estimated ejection fraction of 70%. 2. Normal left ventricular end-diastolic pressure. 3. Double-vessel coronary artery disease with angiographic disease progression involving the stented segment in the mid right coronary artery consistent with angiographic evidence of restenosis.     STOMACH SURGERY      ulcer, gastric bypass due to \"bleeding ulcer\", partial colectomy due to blockage    UPPER GASTROINTESTINAL ENDOSCOPY           Subjective    Pain Level: 0    Orientation   A+O  x4    Home Living  Type of Home: House  Home Layout: One level  Entrance Stairs - Number of Steps: 2  Entrance Stairs - Rails: Right  Home Access: Stairs to enter with rails    Prior Level of Function  Prior Function  ADL Assistance: Independent  Homemaking Assistance: Independent  Ambulation Assistance: Independent  Transfer Assistance: Independent      Objective      Supine to Sit: Supervision  Sit to Supine: Supervision  Sit to Stand: Contact guard assistance, Stand by assistance  Stand to sit: Contact guard assistance, Stand by assistance  Bed to Chair: Contact guard assistance, Stand by assistance     Assessment  Activity Tolerance: Patient tolerated evaluation without incident   Body Structures, Functions, Activity Limitations Requiring Skilled Therapeutic Intervention: Decreased functional mobility , Increased pain, Decreased posture, Decreased high-level IADLs, Decreased balance, Decreased endurance  Chart Reviewed: Yes  Assessment: Pt supine on arrival, supine to sit SBA, Sit to stand CGA to ww, CGA/SBA with ww x30ft with noted increased LE discomfort. Pt seated up to bedside chair for meal.  Pt reports he would like to go to the [address] [place] [address] for some therapy to get stronger and be able to manage independently at home. Therapy Prognosis: Good           Plan   1-2x/ day for 3 days    Goals  Short Term Goals  Time Frame for Short term goals: 3days (POC EXP 9/17/22)  Short term goal 1: Pt to complete 50ft amb with ww CGA to improve brenna to houshold amb  Short term goal 2: Pt to complete all transfers SBA to improve independence with transfers.     Time In: 1130  Time Out: 1151  Timed Coded Minutes: 0  Total Treatment Time: 21    Reba Spicer, PT, PT 9/12/2022

## 2022-09-12 NOTE — PROGRESS NOTES
clear to auscultation bilaterally- no wheezes, rales or rhonchi, normal air movement  Abdomen: soft, non-tender, non-distended, normal bowel sounds, no masses Extremities: no cyanosis, clubbing or edema, pedal pulses 2+. Patient has tenderness in the left groin area and left hip area as well as left lower leg. Skin: warm and dry, no rash   Neurological: alert, oriented, normal speech, no focal findings or movement disorder noted    Assessment and Plan:     Acute on chronic low back pain with left-sided sciatica -continue tramadol for pain control. Resumed gabapentin. Will consult PT and OT. Patient generally deconditioned and will benefit from rehab at [facility]. Will consult  to help with arrangements. Acute anemia -due to GI bleed. S/p 1 unit of PRBC on 9/9 as outpatient. H&H continues to decline despite holding Eliquis. Received 1 unit of PRBC again on 9/11 due to potential ongoing bleeding. H&H improved. Will start on iron supplement  Generalized weakness -consult PT and OT  S/p ERCP on 8/24/2022 with choledocholithiasis, s/p sphincterotomy and balloon extraction at [facility], complicated by postop ERCP bleeding with patient refusing repeat ERCP. Recent cholangitis, E. coli bacteremia with septic shock with ICU admission at [facility], completed 7-day antibiotic course with end date 8/30/2022  History of atrial fibrillation -patient's Eliquis discontinued due to GI bleed. Heart rate controlled on metoprolol  CAD, s/p CABG x3  multiple stents. Continue medical management.   Unfortunately we have to hold his aspirin due to active bleeding  Idiopathic progressive polyneuropathy -resume gabapentin  Depression -continue on Effexor and Zoloft      Electronically signed by Dustin Chacon MD on 9/12/2022 at 7:53 AM    Rounding Hospitalist

## 2022-09-12 NOTE — PROGRESS NOTES
Called the [employer] Notification number.   Spoke with Neftaly Pedersen who made sure the patient, now on Med-Surg floor is signed up with the following verification Number:  [id number]

## 2022-09-12 NOTE — PROGRESS NOTES
CARDIAC CATHETERIZATION Left 02/18/2022    Dr. Mary Ann Paniagua @ [facility]--Medical therapy    CARDIAC SURGERY      13 stents, aorta and CAD    CHOLECYSTECTOMY      COLECTOMY      COLONOSCOPY      COLONOSCOPY  03/20/2015    DILATATION, ESOPHAGUS      EYE SURGERY Bilateral     cataracts    HERNIA REPAIR Right     inguinal    JOINT REPLACEMENT Right 1998 and 2012     knee (3rd surgery)    JOINT REPLACEMENT Right 2012    hip    [place] ESOPHAGOGASTRODUODENOSCOPY TRANSORAL DIAGNOSTIC N/A 06/14/2017    EGD ESOPHAGOGASTRODUODENOSCOPY; photos; bx's performed by Denzel Carrion MD at [address] [place] [address]  03/09/2011    Dr Xena Griffith. Hyperdynamic left ventricular systolic function, estimated ejection fraction of 70%. 2. Normal left ventricular end-diastolic pressure. 3. Double-vessel coronary artery disease with angiographic disease progression involving the stented segment in the mid right coronary artery consistent with angiographic evidence of restenosis. STOMACH SURGERY      ulcer, gastric bypass due to \"bleeding ulcer\", partial colectomy due to blockage    UPPER GASTROINTESTINAL ENDOSCOPY       Subjective  Subjective: Patient was lying supine in bed upon OT arrival. Reported a 5/10 pain level in his LLE at rest/post activity.     Social/Functional History  Lives With: Spouse  Type of Home: House  Home Layout: One level  Home Access: Stairs to enter with rails  Entrance Stairs - Number of Steps: 2  Entrance Stairs - Rails: Right  Bathroom Shower/Tub: Walk-in shower  Bathroom Toilet: Handicap height  Bathroom Equipment: Shower chair, Grab bars in shower, Hand-held shower    Prior Function  ADL Assistance: Independent  Homemaking Assistance: Independent  Ambulation Assistance: Independent  Transfer Assistance: Independent    Objective  ADLs:  Feeding: Setup  Grooming:  (in standing)  UE Bathing: Supervision  LE Bathing: Minimal assistance (in sitting/standing PRN)  UE Dressing: Supervision  LE Dressing: Minimal assistance  Toileting: Contact guard assistance     Transfers:  Supine to sit: SBA  Sit to stand: SBA  Stand to sit: SBA  Toilet: SBA    Assessment: Patient was lying supine in bed upon OT arrival. Completed functional transfers and ADLs as documented above (based on clinical reasoning and observation). Demonstrated proper hand placement during sit <> stand transfers. Declined to use bathroom this date, but based on clinical reasoning and LLE pain level, patient would benefit from SBA/CGA (F dynamic standing balance) during standing portions of toileting for increased safety. BUE AROM and strength is WFL. Patient would benefit from OT services during hospital stay to increase independence and safety with ADLs/functional transfers. Patient remains in recliner with call light/personal items within reach upon OT departure. Goals  Short Term Goals  Time Frame for Short term goals: 3 days (9/14/22)  Short Term Goal 1: Patient will complete upper body dressing/bathing tasks while using adaptive equipment/techniques PRN with MI. Short Term Goal 2: Patient will complete lower body dressing and/or bathing tasks while using adaptive equipment/techniques PRN with SBA. Short Term Goal 3: Patient will complete a commode transfer while using DME PRN with S/U. Short Term Goal 4: Patient will complete toileting tasks while using adaptive equipment/techniques PRN with SUP. Short Term Goal 5: Patient will improve dynamic standing balance to F+ to increase independence with personal hygiene/grooming tasks.   Long Term Goals  Time Frame for Long term goals : STG=LTG    Plan    Times per Week: 3x  Times per Day: Daily    Time In: 1130  Time Out: 3550  Timed Coded Minutes: 0  Total Treatment Time: [place] [address], OTR/L  9/12/2022

## 2022-09-12 NOTE — PROGRESS NOTES
Comprehensive Nutrition Assessment    Type and Reason for Visit:  Initial, Positive Nutrition Screen    Nutrition Recommendations/Plan:   Encourage oral intakes  Ensure tid (encourage after solids at meals)     Malnutrition Assessment:  Malnutrition Status:  Mild malnutrition (09/12/22 2234)    Context:  Acute Illness     Findings of the 6 clinical characteristics of malnutrition:  Energy Intake:  75% or less of estimated energy requirements for 7 or more days  Weight Loss:  No significant weight loss     Body Fat Loss:  No significant body fat loss     Muscle Mass Loss:  No significant muscle mass loss    Fluid Accumulation:  Mild Extremities   Strength:  Not Performed    Nutrition Assessment:    Mild malnutrition r/t inadequate nutrient intakes, AEB low PO pta (hospitalization at [facility]). Current weight above reported usual values for patient. Multiple GI surgeries noted. Denies n/v abdominal pain in regards to appetite. Agreeable to use of Ensure to supplement intakes. Nutrition Related Findings:    thin, trace BLE edema, + b/s. Wound Type: None       Current Nutrition Intake & Therapies:    Average Meal Intake: 1-25%  Average Supplements Intake: None Ordered  ADULT DIET; Regular  ADULT ORAL NUTRITION SUPPLEMENT; Breakfast, Lunch, Dinner; Standard High Calorie/High Protein Oral Supplement    Anthropometric Measures:  Height: 5' 10\" (177.8 cm)  Ideal Body Weight (IBW): 166 lbs (75 kg)    Admission Body Weight: 165 lb (74.8 kg)  Current Body Weight: 164 lb 12.8 oz (74.8 kg), 99.3 % IBW. Weight Source: Bed Scale  Current BMI (kg/m2): 23.6  Usual Body Weight: 161 lb (73 kg) (per patient)  % Weight Change (Calculated): 2.4                    BMI Categories: Normal Weight (BMI 18.5-24. 9)    Estimated Daily Nutrient Needs:  Energy Requirements Based On: Kcal/kg  Weight Used for Energy Requirements: Current  Energy (kcal/day): 5036-0806 (23-28)  Weight Used for Protein Requirements: Current  Protein (g/day):  (1.2-1.4)  Method Used for Fluid Requirements: 1 ml/kcal  Fluid (ml/day): 2100    Nutrition Diagnosis:   Other (Comment) (Mild malnutrition) related to inadequate protein-energy intake as evidenced by poor intake prior to admission, localized or generalized fluid accumulation    Lab Results   Component Value Date     09/11/2022    K 4.5 09/11/2022     09/11/2022    CO2 23 09/11/2022    BUN 19 09/11/2022    CREATININE 0.81 09/11/2022    GLUCOSE 120 (H) 09/11/2022    CALCIUM 8.7 09/11/2022    PROT 5.9 (L) 09/11/2022    LABALBU 3.2 (L) 09/11/2022    BILITOT 0.5 09/11/2022    ALKPHOS 91 09/11/2022    AST 24 09/11/2022    ALT 22 09/11/2022    LABGLOM >60 09/11/2022    GFRAA >60 09/11/2022     Lab Results   Component Value Date    LABA1C 6.2 04/26/2022     Lab Results   Component Value Date     09/16/2021     Lab Results   Component Value Date    VITD25 42.1 08/30/2021     Nutrition Interventions:   Food and/or Nutrient Delivery: Continue Current Diet, Start Oral Nutrition Supplement  Nutrition Education/Counseling: Education initiated  Coordination of Nutrition Care: Continue to monitor while inpatient  Plan of Care discussed with: patient    Goals:     Goals: Meet at least 75% of estimated needs       Nutrition Monitoring and Evaluation:   Behavioral-Environmental Outcomes: None Identified  Food/Nutrient Intake Outcomes: Supplement Intake, Food and Nutrient Intake  Physical Signs/Symptoms Outcomes: Biochemical Data, Weight, Fluid Status or Edema    Discharge Planning:    Continue Oral Nutrition Supplement     Belkis Dorado, [address] [place] [address],   Contact: [phone]

## 2022-09-12 NOTE — PROGRESS NOTES
Pt is resting quietly with eyes closed, resp even and nonlabored with no s/s of distress or discomfort noted. The Gabapentin was effective at this time. Call light is within reach.

## 2022-09-12 NOTE — PLAN OF CARE
[facility]  Inpatient Physical Therapy  Plan of Care  Date: 2022  Patient Name: Sally Figueroa        : 1935  (14 y.o.)  Referring Practitioner: Dr. Saira Joseph  Admission Date: 2022  Referral Date : 22  Diagnosis: Acute on chronic lower back pain with left-sided sciatica  Treatment Diagnosis: Gait ataxia  PT Orders Received and Evaluation Complete  Identified Problem Areas: Body Structures, Functions, Activity Limitations Requiring Skilled Therapeutic Intervention: Decreased functional mobility , Increased pain, Decreased posture, Decreased high-level IADLs, Decreased balance, Decreased endurance  Therapy Prognosis: Good    Justification for Skilled Services:  [] Reduce Falls   [x] Improve Ambulation  [x]  Complete Daily Tasks Safely   [x] Improve Balance   [x] Improve LE strength  [x]  Return to Prior Level of Function  [x] Improve Functional Mobility   [x]  Family/Caregiver Education  [x] Patient Education: [x]Assistive Devices [x]Home Exercise Program and Progression    Plan  Frequency: 1x/day Daily    Duration: 3 days  [] Modalities:  [x] Therapeutic Exercise   [x] Gait Training  [x] Therapeutic Activity    [] Home Safety Evaluation         [] Massage                        [] Neuromuscular Re-education [x] Back Education             [x] Patient Education [x] Home Exercise Program       Rehab Potential:  [x]  Good [] Fair   []  Poor    Goals  Short Term Goals  Time Frame for Short term goals: 3days (POC EXP 22)  Short term goal 1: Pt to complete 50ft amb with ww CGA to improve brenna to houshold amb  Short term goal 2: Pt to complete all transfers SBA to improve independence with transfers. Upon Swingbed Transfer this Plan of Care will be followed until revision is complete.     Michelle Chandra, PT, PT   Date: 2022

## 2022-09-12 NOTE — ACP (ADVANCE CARE PLANNING)
Advance Care Planning     Advance Care Planning Activator (Inpatient)  Conversation Note      Date of ACP Conversation: 9/12/2022     Conversation Conducted with: Patient with Decision Making Capacity    ACP Activator: Dennis Henderson, [address] [facility] Decision Maker:     Current Designated Health Care Decision Maker:     Primary Decision Maker: Maikel Villanueva - Spouse - [phone]    Secondary Decision Maker: Sivakumar Harris and supplemental Blayne Gudino  Click here to complete Healthcare Decision Makers including section of the Healthcare Decision Maker Relationship (ie \"Primary\")  Today we documented Decision Maker(s) consistent with ACP documents on file. Care Preferences    Ventilation: \"If you were in your present state of health and suddenly became very ill and were unable to breathe on your own, what would your preference be about the use of a ventilator (breathing machine) if it were available to you? \"      Would the patient desire the use of ventilator (breathing machine)?: yes    \"If your health worsens and it becomes clear that your chance of recovery is unlikely, what would your preference be about the use of a ventilator (breathing machine) if it were available to you? \"     Would the patient desire the use of ventilator (breathing machine)?: No      Resuscitation  \"CPR works best to restart the heart when there is a sudden event, like a heart attack, in someone who is otherwise healthy. Unfortunately, CPR does not typically restart the heart for people who have serious health conditions or who are very sick. \"    \"In the event your heart stopped as a result of an underlying serious health condition, would you want attempts to be made to restart your heart (answer \"yes\" for attempt to resuscitate) or would you prefer a natural death (answer \"no\" for do not attempt to resuscitate)? \" no       [] Yes   [x] No   Educated Patient / Decision Maker regarding differences between Advance Directives

## 2022-09-12 NOTE — PROGRESS NOTES
Pt is resting quietly with eyes closed, resp even and non labored with no s/s of distress or discomfort noted at this time. Call light is within reach.

## 2022-09-12 NOTE — PLAN OF CARE
Problem: Discharge Planning  Goal: Discharge to home or other facility with appropriate resources  9/11/2022 2146 by Thuan Nance RN  Outcome: Progressing  Flowsheets (Taken 9/11/2022 1945)  Discharge to home or other facility with appropriate resources:   Identify barriers to discharge with patient and caregiver   Arrange for needed discharge resources and transportation as appropriate   Identify discharge learning needs (meds, wound care, etc)   Refer to discharge planning if patient needs post-hospital services based on physician order or complex needs related to functional status, cognitive ability or social support system  9/11/2022 1155 by Toño Flores RN  Outcome: Progressing  Flowsheets (Taken 9/11/2022 1149)  Discharge to home or other facility with appropriate resources:   Identify barriers to discharge with patient and caregiver   Identify discharge learning needs (meds, wound care, etc)   Refer to discharge planning if patient needs post-hospital services based on physician order or complex needs related to functional status, cognitive ability or social support system   Arrange for needed discharge resources and transportation as appropriate   Arrange for interpreters to assist at discharge as needed     Problem: Pain  Goal: Verbalizes/displays adequate comfort level or baseline comfort level  9/11/2022 2146 by Thuan Nance RN  Outcome: Progressing  Flowsheets (Taken 9/11/2022 1943)  Verbalizes/displays adequate comfort level or baseline comfort level:   Encourage patient to monitor pain and request assistance   Assess pain using appropriate pain scale   Administer analgesics based on type and severity of pain and evaluate response   Implement non-pharmacological measures as appropriate and evaluate response   Notify Licensed Independent Practitioner if interventions unsuccessful or patient reports new pain  9/11/2022 1155 by Toño Flores RN  Outcome: Progressing     Problem: Skin/Tissue Integrity  Goal: Absence of new skin breakdown  Description: 1. Monitor for areas of redness and/or skin breakdown  2. Assess vascular access sites hourly  3. Every 4-6 hours minimum:  Change oxygen saturation probe site  4. Every 4-6 hours:  If on nasal continuous positive airway pressure, respiratory therapy assess nares and determine need for appliance change or resting period.   9/11/2022 2146 by Juwan Marinelli RN  Outcome: Progressing  9/11/2022 1155 by Jocelyne Means RN  Outcome: Progressing     Problem: Safety - Adult  Goal: Free from fall injury  9/11/2022 2146 by Juwan Marinelli RN  Outcome: Progressing  Flowsheets (Taken 9/11/2022 1945)  Free From Fall Injury: Instruct family/caregiver on patient safety  9/11/2022 1155 by Jocelyne Means RN  Outcome: Progressing     Problem: ABCDS Injury Assessment  Goal: Absence of physical injury  9/11/2022 2146 by Juwan Marinelli RN  Outcome: Progressing  Flowsheets (Taken 9/11/2022 1945)  Absence of Physical Injury: Implement safety measures based on patient assessment  9/11/2022 1155 by Jocelyne Means RN  Outcome: Progressing     Problem: Skin/Tissue Integrity - Adult  Goal: Skin integrity remains intact  9/11/2022 2146 by Juwan Marinelli RN  Outcome: Progressing  Flowsheets (Taken 9/11/2022 1945)  Skin Integrity Remains Intact: Monitor for areas of redness and/or skin breakdown  9/11/2022 1155 by Jocelyne Means RN  Outcome: Progressing  Goal: Incisions, wounds, or drain sites healing without S/S of infection  9/11/2022 2146 by Juwan Marinelli RN  Outcome: Progressing  Flowsheets (Taken 9/11/2022 1945)  Incisions, Wounds, or Drain Sites Healing Without Sign and Symptoms of Infection: ADMISSION and DAILY: Assess and document risk factors for pressure ulcer development  9/11/2022 1155 by Jocelyne Means RN  Outcome: Progressing  Goal: Oral mucous membranes remain intact  9/11/2022 2146 by Juwan Marinelli RN  Outcome: Progressing  Flowsheets (Taken 9/11/2022 1945)  Oral Mucous Membranes Remain Intact:   Implement oral medicated treatments as ordered   Implement preventative oral hygiene regimen   Assess oral mucosa and hygiene practices  9/11/2022 1155 by Ileana Altamirano RN  Outcome: Progressing     Problem: Musculoskeletal - Adult  Goal: Return mobility to safest level of function  9/11/2022 2146 by Luci Jameson RN  Outcome: Progressing  Flowsheets (Taken 9/11/2022 1945)  Return Mobility to Safest Level of Function:   Assess patient stability and activity tolerance for standing, transferring and ambulating with or without assistive devices   Assist with transfers and ambulation using safe patient handling equipment as needed   Ensure adequate protection for wounds/incisions during mobilization   Obtain physical therapy/occupational therapy consults as needed   Apply continuous passive motion per provider or physical therapy orders to increase flexion toward goal   Instruct patient/family in ordered activity level  9/11/2022 1155 by Ileana Altamirano RN  Outcome: Progressing  Goal: Maintain proper alignment of affected body part  9/11/2022 2146 by Luci Jameson RN  Outcome: Progressing  Flowsheets (Taken 9/11/2022 1945)  Maintain proper alignment of affected body part:   Support and protect limb and body alignment per provider's orders   Instruct and reinforce with patient and family use of appropriate assistive device and precautions (e.g. spinal or hip dislocation precautions)  9/11/2022 1155 by Ileana Altamirano RN  Outcome: Progressing

## 2022-09-12 NOTE — PROGRESS NOTES
KIRTI and RN Case manager met with pt and spouse to complete assessment during quality rounds this morning. Pt is alert and oriented and pleasant with interaction. Pt is a 82year old  male admitted for chronic low back pain with left sided sciatica, unspecified back pain laterally. Pt lives with his spouse in their home in [facility]. Pt uses a walker and shower chair at home and spouse reports that their home is handicap accessible throughout. Pt is current with [facility] per spouse as of last week and spouse states that she has not heard from them this week. Both pt and spouse were driving prior to pt current illness. Pt is a DNR CCA code and follows with Dr Ligia Jaramillo as PCP. Pt has advance directives on file in his medical record and reports these to be accurate. ACP note completed with pt. Pt and spouse report that his medications are affordable. Pt and spouse are planning for pt to go to rehab at [facility] at discharge from hospital. Pt has been there before and they are familiar with care there. Referral completed to [facility] and will await response if they are able to accept. [facility] notified of pt admission and he is not a patient of theirs. [facility] looked back into discharge encounters and [facility] is current with pt. Notified them of pt admission and plan for discharge. KIRTI will follow and plan discharge accordingly.  Frank TATUMW 9/12/2022

## 2022-09-13 VITALS
DIASTOLIC BLOOD PRESSURE: 61 MMHG | HEART RATE: 69 BPM | BODY MASS INDEX: 23.59 KG/M2 | WEIGHT: 164.8 LBS | SYSTOLIC BLOOD PRESSURE: 99 MMHG | HEIGHT: 70 IN | OXYGEN SATURATION: 96 % | RESPIRATION RATE: 16 BRPM | TEMPERATURE: 98.5 F

## 2022-09-13 LAB
SARS-COV-2, RAPID: NOT DETECTED
SPECIMEN DESCRIPTION: NORMAL

## 2022-09-13 PROCEDURE — 2580000003 HC RX 258: Performed by: INTERNAL MEDICINE

## 2022-09-13 PROCEDURE — 97535 SELF CARE MNGMENT TRAINING: CPT

## 2022-09-13 PROCEDURE — 87635 SARS-COV-2 COVID-19 AMP PRB: CPT

## 2022-09-13 PROCEDURE — C9803 HOPD COVID-19 SPEC COLLECT: HCPCS

## 2022-09-13 PROCEDURE — 97110 THERAPEUTIC EXERCISES: CPT

## 2022-09-13 PROCEDURE — 97530 THERAPEUTIC ACTIVITIES: CPT

## 2022-09-13 PROCEDURE — 6370000000 HC RX 637 (ALT 250 FOR IP): Performed by: INTERNAL MEDICINE

## 2022-09-13 PROCEDURE — 94761 N-INVAS EAR/PLS OXIMETRY MLT: CPT

## 2022-09-13 RX ORDER — PANTOPRAZOLE SODIUM 40 MG/1
40 TABLET, DELAYED RELEASE ORAL
Qty: 30 TABLET | Refills: 3 | DISCHARGE
Start: 2022-09-14

## 2022-09-13 RX ORDER — HYDROCODONE BITARTRATE AND ACETAMINOPHEN 5; 325 MG/1; MG/1
1 TABLET ORAL EVERY 6 HOURS PRN
Qty: 56 TABLET | Refills: 0 | Status: SHIPPED | OUTPATIENT
Start: 2022-09-13 | End: 2022-09-27

## 2022-09-13 RX ORDER — FERROUS SULFATE 325(65) MG
325 TABLET ORAL 2 TIMES DAILY WITH MEALS
Qty: 30 TABLET | Refills: 3 | DISCHARGE
Start: 2022-09-13 | End: 2022-10-07 | Stop reason: ALTCHOICE

## 2022-09-13 RX ORDER — HYDROCODONE BITARTRATE AND ACETAMINOPHEN 5; 325 MG/1; MG/1
1 TABLET ORAL EVERY 6 HOURS PRN
Status: DISCONTINUED | OUTPATIENT
Start: 2022-09-13 | End: 2022-09-13 | Stop reason: HOSPADM

## 2022-09-13 RX ORDER — GABAPENTIN 300 MG/1
300 CAPSULE ORAL 3 TIMES DAILY
Qty: 90 CAPSULE | Refills: 3 | DISCHARGE
Start: 2022-09-13 | End: 2022-10-18

## 2022-09-13 RX ORDER — MULTIVITAMIN WITH IRON
1 TABLET ORAL DAILY
Refills: 0 | DISCHARGE
Start: 2022-09-14

## 2022-09-13 RX ADMIN — HYDROCODONE BITARTRATE AND ACETAMINOPHEN 1 TABLET: 5; 325 TABLET ORAL at 08:32

## 2022-09-13 RX ADMIN — PANTOPRAZOLE SODIUM 40 MG: 40 TABLET, DELAYED RELEASE ORAL at 06:24

## 2022-09-13 RX ADMIN — TRAMADOL HYDROCHLORIDE 50 MG: 50 TABLET ORAL at 02:14

## 2022-09-13 RX ADMIN — GABAPENTIN 300 MG: 300 CAPSULE ORAL at 08:32

## 2022-09-13 RX ADMIN — THERA TABS 1 TABLET: TAB at 08:32

## 2022-09-13 RX ADMIN — FERROUS SULFATE TAB 325 MG (65 MG ELEMENTAL FE) 325 MG: 325 (65 FE) TAB at 08:31

## 2022-09-13 RX ADMIN — CHOLECALCIFEROL TAB 25 MCG (1000 UNIT) 3000 UNITS: 25 TAB at 08:31

## 2022-09-13 RX ADMIN — SODIUM CHLORIDE, PRESERVATIVE FREE 10 ML: 5 INJECTION INTRAVENOUS at 08:31

## 2022-09-13 RX ADMIN — GABAPENTIN 100 MG: 100 CAPSULE ORAL at 03:49

## 2022-09-13 RX ADMIN — SERTRALINE HYDROCHLORIDE 25 MG: 50 TABLET ORAL at 08:32

## 2022-09-13 RX ADMIN — VENLAFAXINE 100 MG: 25 TABLET ORAL at 08:31

## 2022-09-13 RX ADMIN — ATORVASTATIN CALCIUM 80 MG: 40 TABLET, FILM COATED ORAL at 08:32

## 2022-09-13 ASSESSMENT — PAIN SCALES - GENERAL
PAINLEVEL_OUTOF10: 8
PAINLEVEL_OUTOF10: 3
PAINLEVEL_OUTOF10: 8
PAINLEVEL_OUTOF10: 8

## 2022-09-13 ASSESSMENT — PAIN DESCRIPTION - DESCRIPTORS
DESCRIPTORS: SHARP
DESCRIPTORS: SHARP

## 2022-09-13 ASSESSMENT — PAIN DESCRIPTION - LOCATION
LOCATION: LEG

## 2022-09-13 ASSESSMENT — PAIN DESCRIPTION - ORIENTATION
ORIENTATION: LEFT

## 2022-09-13 NOTE — PROGRESS NOTES
Physical Therapy  Phone: Chloe  Date: 2022  Fax: [phone]      Physical Therapy    Daily Note    Patient Name: Harper Cam      : 1935  (82 y.o.)  MRN: [id number]     Pt is PROGRESSING toward goals and increased independence of mobility this treatment session        Assessment        Supine to Sit: Supervision         Sit to Stand: Stand by assistance, Supervision  Stand to sit: Stand by assistance, Supervision               WB Status: Ambulates with wheeled walker and CGA/SBA ~100 ft x2. No LOB noted with gait. Assessment: Patient in bed upon arrival.  Supine to sit is supervision. Sit to stand from bed is SBA/supervision. Ambulates with wheeled walker into bathroom with CGA. No LOB. Stands to urinate and maintains balance well. Also demonstrates good standing balance to wash hands. Ambulates in hallway with wheeled walker ~100 ft x2 without LOB. Returns to room to sit up in chair. Call light in reach and chair alarm in place. Safety Devices  Type of Devices: Call light within reach, Gait belt, Chair alarm in place, Left in chair          Time In: 0900  Time Out: 09  Timed Coded Minutes: 23  Total Treatment Time: 23      Plan  Cont Per Plan Of Care    Goals  Short Term Goals  Time Frame for Short term goals: 3days (POC EXP 22)  Short term goal 1: Pt to complete 50ft amb with ww CGA to improve brenna to houshold amb  Short term goal 2: Pt to complete all transfers SBA to improve independence with transfers.              Long Term Goals                      Marta Eason License Number: [id number]    Date: 2022

## 2022-09-13 NOTE — PROGRESS NOTES
Hospitalist Progress Note  9/13/2022 7:00 AM  Subjective:   Admit Date: 9/11/2022  PCP: Brody Mckeon MD    Interval History:     The patient continues to complain of pain in his left groin and leg area. States tramadol is not helping. Other than that he is doing well and has no other complaints    Diet: ADULT DIET; Regular  ADULT ORAL NUTRITION SUPPLEMENT; Breakfast, Lunch, Dinner; Standard High Calorie/High Protein Oral Supplement  Medications:   Scheduled Meds:   ferrous sulfate  325 mg Oral BID WC    gabapentin  300 mg Oral TID    atorvastatin  80 mg Oral Daily    metoprolol tartrate  50 mg Oral Daily    pantoprazole  40 mg Oral QAM AC    sertraline  25 mg Oral Daily    venlafaxine  100 mg Oral BID    Vitamin D  3,000 Units Oral Daily    sodium chloride flush  5-40 mL IntraVENous 2 times per day    multivitamin  1 tablet Oral Daily     Continuous Infusions:   sodium chloride      sodium chloride       PRN Medications: HYDROcodone 5 mg - acetaminophen, docusate sodium, gabapentin, nitroGLYCERIN, sodium chloride flush, sodium chloride, ondansetron **OR** ondansetron, polyethylene glycol, acetaminophen **OR** acetaminophen, sodium chloride    Objective:   Vitals: /82   Pulse 80   Temp 98.4 °F (36.9 °C) (Oral)   Resp 16   Ht 5' 10\" (1.778 m)   Wt 164 lb 12.8 oz (74.8 kg)   SpO2 96%   BMI 23.65 kg/m²   BMI: Body mass index is 23.65 kg/m².     CBC:   Recent Labs     09/11/22  1006 09/11/22  1928 09/12/22  0440   WBC 4.9  --  4.5   HGB 7.6* 8.3* 8.9*     --  415     BMP:    Recent Labs     09/11/22  1006      K 4.5      CO2 23   BUN 19   CREATININE 0.81   GLUCOSE 120*     Hepatic:   Recent Labs     09/11/22  1006   AST 24   ALT 22   BILITOT 0.5   ALKPHOS 91       Physical Exam:    General Appearance: alert and oriented to person, place and time, in no acute distress  Cardiovascular: normal rate, regular rhythm, normal S1 and S2, no murmurs  Pulmonary/Chest: clear to auscultation bilaterally- no wheezes, rales or rhonchi, normal air movement  Abdomen: soft, non-tender, non-distended, normal bowel sounds, no masses Extremities: no cyanosis, clubbing or edema, pedal pulses 2+. Patient has tenderness in the left groin area and left hip area as well as left lower leg. Skin: warm and dry, no rash   Neurological: alert, oriented, normal speech, no focal findings or movement disorder noted      Assessment and Plan:     Acute on chronic low back pain with left-sided sciatica -we will change tramadol to Norco for better pain control. Resumed gabapentin. Will consult PT and OT. Patient generally deconditioned and will benefit from rehab at [facility]. Awaiting arrangements to discharge to [facility]  Acute anemia -due to GI bleed. S/p 1 unit of PRBC on 9/9 as outpatient. H&H continues to decline despite holding Eliquis. Received 1 unit of PRBC again on 9/11 due to potential ongoing bleeding. H&H improved. Will start on iron supplement  Generalized weakness -consult PT and OT  S/p ERCP on 8/24/2022 with choledocholithiasis, s/p sphincterotomy and balloon extraction at [facility], complicated by postop ERCP bleeding with patient refusing repeat ERCP. Recent cholangitis, E. coli bacteremia with septic shock with ICU admission at [facility], completed 7-day antibiotic course with end date 8/30/2022  History of atrial fibrillation -patient's Eliquis discontinued due to GI bleed. Heart rate controlled on metoprolol  CAD, s/p CABG x3  multiple stents. Continue medical management.   Unfortunately we have to hold his aspirin due to active bleeding  Idiopathic progressive polyneuropathy -resume gabapentin  Depression -continue on Effexor and Zoloft           Electronically signed by Evelio Gonzalez MD on 9/13/2022 at 7:00 AM    [address] [place] [address]

## 2022-09-13 NOTE — DISCHARGE SUMMARY
Hospitalist Discharge Summary    Patient:  Tressa Matthews  YOB: 1935    MRN: [id number]   Acct: [de-identified]    Primary Care Physician: Peter Blake MD    Admit date:  9/11/2022    Discharge date:  9/13/2022       Discharge Diagnoses: 1. Chronic low back pain with left-sided sciatica, unspecified back pain laterality  2. Acute anemia due to GI blood loss post ERCP  3. Generalized weakness  4. S/p ERCP on 8/24/2022 with choledocholithiasis, s/p sphincterotomy and balloon extraction at [facility], complicated by postop ERCP bleeding with patient refusing repeat ERCP  5. Recent cholangitis and E.coli bacteremia  6. H/O Afib  7. CAD, s/p CABG x 3   8. Idiopathic progressive polyneuropathy  9. Depression       Discharge Medications:         Medication List        START taking these medications      ferrous sulfate 325 (65 Fe) MG tablet  Commonly known as: IRON 325  Take 1 tablet by mouth 2 times daily (with meals)     HYDROcodone-acetaminophen 5-325 MG per tablet  Commonly known as: NORCO  Take 1 tablet by mouth every 6 hours as needed for Pain for up to 14 days. multivitamin Tabs tablet  Take 1 tablet by mouth daily  Start taking on: September 14, 2022     pantoprazole 40 MG tablet  Commonly known as: PROTONIX  Take 1 tablet by mouth every morning (before breakfast)  Start taking on: September 14, 2022  Replaces: omeprazole 20 MG delayed release capsule            CHANGE how you take these medications      gabapentin 300 MG capsule  Commonly known as: NEURONTIN  Take 1 capsule by mouth 3 times daily for 30 days.   What changed:   medication strength  how much to take  when to take this  reasons to take this  additional instructions            CONTINUE taking these medications      atorvastatin 80 MG tablet  Commonly known as: LIPITOR     docusate sodium 100 MG capsule  Commonly known as: Colace  Take 1 capsule by mouth 3 times daily as needed for Constipation     metoprolol tartrate 50 MG tablet  Commonly known as: LOPRESSOR     nitroGLYCERIN 0.4 MG SL tablet  Commonly known as: NITROSTAT  Place 1 tablet under the tongue every 5 minutes as needed (chest pain)     ondansetron 4 MG tablet  Commonly known as: Zofran  Take 1 tablet by mouth every 6 hours as needed for Nausea     sertraline 25 MG tablet  Commonly known as: ZOLOFT     venlafaxine 100 MG tablet  Commonly known as: EFFEXOR  Take 1 tablet by mouth 2 times daily     vitamin D 1000 UNIT Tabs tablet  Commonly known as: CHOLECALCIFEROL            STOP taking these medications      apixaban 5 MG Tabs tablet  Commonly known as: ELIQUIS     ELDERBERRY PO     ibuprofen 400 MG tablet  Commonly known as: IBU     omeprazole 20 MG delayed release capsule  Commonly known as: PRILOSEC  Replaced by: pantoprazole 40 MG tablet               Where to Get Your Medications        You can get these medications from any pharmacy    Bring a paper prescription for each of these medications  HYDROcodone-acetaminophen 5-325 MG per tablet       Information about where to get these medications is not yet available    Ask your nurse or doctor about these medications  ferrous sulfate 325 (65 Fe) MG tablet  gabapentin 300 MG capsule  multivitamin Tabs tablet  pantoprazole 40 MG tablet         Diet:  regular diet as tolerates    Activity:  Activity as tolerated (Patient may move about with assist as indicated or with supervision.)    Follow-up:   with provider at [facility]       Diagnostic Test:      CBC:   Recent Labs     09/12/22  0440   WBC 4.5   HGB 8.9*        BMP:    Recent Labs     09/11/22  1006      K 4.5      CO2 23   BUN 19   CREATININE 0.81   GLUCOSE 120*     Calcium:  Recent Labs     09/11/22  1006   CALCIUM 8.7       Physical Exam:    Vitals:Patient Vitals for the past 24 hrs:   BP Temp Temp src Pulse Resp SpO2   09/13/22 0932 -- -- -- -- 16 96 %   09/13/22 0902 -- -- -- -- 16 --   09/13/22 0714 99/61 98.5 °F (36.9 °C) Oral 69 16 96 % 09/13/22 0557 -- -- -- 80 -- 96 %   09/13/22 0350 138/82 98.4 °F (36.9 °C) Oral 79 16 96 %   09/12/22 2300 138/82 98.4 °F (36.9 °C) Oral 79 16 97 %   09/12/22 2120 -- -- -- -- -- 94 %   09/12/22 2118 -- -- -- -- -- (!) 86 %   09/12/22 2000 130/67 97.7 °F (36.5 °C) Oral 70 20 96 %     Weight: Weight: 164 lb 12.8 oz (74.8 kg)     General Appearance: alert and oriented to person, place and time, in no acute distress  Cardiovascular: normal rate, regular rhythm, normal S1 and S2, no murmurs  Pulmonary/Chest: clear to auscultation bilaterally- no wheezes, rales or rhonchi, normal air movement  Abdomen: soft, non-tender, non-distended, normal bowel sounds  Extremities: no cyanosis, clubbing or edema, pedal pulses 2+. Patient has tenderness in the left groin area and left hip area as well as left lower leg. Skin: warm and dry, no rash   Neurological: alert, oriented, normal speech, no focal findings or movement disorder noted        Hospital Course: The patient is a 82 y.o. male who presented to the emergency room by EMS for evaluation of severe pain in the left lower back/groin area started last night. Patient states its sharp, 9-10/10 in intensity, radiating to entire left lower extremity down to his foot. The pain is worse when he moves and somewhat better when he is still. He has a history of chronic pain in his left groin and leg area. Also has a history of idiopathic neuropathy. States he used to be on gabapentin 400 mg at nighttime but for some reason it was discontinued. Most recently he was taking gabapentin 100 mg twice daily. Patient states that when he went home from ER 2 days ago for some reason he stopped taking it thinking it was contributing to his bleeding. Patient reports no swelling of the left leg, no significant numbness. He has no other complaints today such as chest pain, shortness of breath, cough, nausea or vomiting.      Patient was recently hospitalized at [facility] from 8/23/2022 to 9/3/2022 due to acute cholangitis, transaminitis, E. coli bacteremia with septic shock requiring ICU admission. He underwent ERCP with sphincterotomy and balloon extraction. Had postop bleed and declined repeat ERCP. Was discharged home with Eliquis that he takes for history of atrial fibrillation. Presented to the emergency room on 9 /9 complaining of altered mental status, hypotension. Vitals were stable in ER and his mental status was at baseline. He was noted to have low hemoglobin of 6.8. Was transfused 1 units of PRBC and recommended to stop Eliquis and follow-up with Dr. Medardo Gutierrez. Patient was also recommended transfer to [facility] for evaluation, however he declined. Patient's work-up in the emergency room today revealed stable vitals. BMP was unremarkable. LFTs also unremarkable with normal transaminases and alk phos level. CBC revealed WBC 4.9. Hemoglobin is low 7.6. Hematocrit 23.4. COVID-19 was negative. Patient had difficulty ambulating in the emergency room and was admitted for pain control as well as addressing ongoing anemia. The patient and his family also expressed their interest in ECF placement for rehab. Acute on chronic low back pain with left-sided sciatica - pain management with [facility].  Resumed gabapentin. Discharge to [facility] for PT and OT. Acute anemia -due to GI bleed. S/p 1 unit of PRBC on 9/9 as outpatient. Received 1 unit of PRBC again on 9/11 due to persisting anemia and potential ongoing bleeding. H&H improved. Eliquis stopped. Started on iron supplement  Generalized weakness   S/p ERCP on 8/24/2022 with choledocholithiasis, s/p sphincterotomy and balloon extraction at [facility], complicated by postop ERCP bleeding with patient refusing repeat ERCP.   Recent cholangitis, E. coli bacteremia with septic shock with ICU admission at [facility], completed 7-day antibiotic course with end date 8/30/2022  History of atrial fibrillation -patient's Eliquis discontinued due to GI bleed. Heart rate controlled on metoprolol  CAD, s/p CABG x3  multiple stents. Continue medical management.   Unfortunately we have to hold his aspirin due to active bleeding  Idiopathic progressive polyneuropathy -resumed gabapentin  Depression - , stable, continue on Effexor and Zoloft         Disposition: SNF    Condition: Stable    Time Spent: 32 minutes      Electronically signed by Bronson Comer MD on 9/13/2022 at 7:12 PM  Discharging Hospitalist

## 2022-09-13 NOTE — PLAN OF CARE
Problem: Discharge Planning  Goal: Discharge to home or other facility with appropriate resources  Outcome: Progressing     Problem: Pain  Goal: Verbalizes/displays adequate comfort level or baseline comfort level  Outcome: Progressing     Problem: Skin/Tissue Integrity  Goal: Absence of new skin breakdown  Description: 1. Monitor for areas of redness and/or skin breakdown  2. Assess vascular access sites hourly  3. Every 4-6 hours minimum:  Change oxygen saturation probe site  4. Every 4-6 hours:  If on nasal continuous positive airway pressure, respiratory therapy assess nares and determine need for appliance change or resting period.   Outcome: Progressing     Problem: Safety - Adult  Goal: Free from fall injury  Outcome: Progressing  Flowsheets (Taken 9/13/2022 0034)  Free From Fall Injury: Instruct family/caregiver on patient safety     Problem: ABCDS Injury Assessment  Goal: Absence of physical injury  Outcome: Progressing  Flowsheets (Taken 9/13/2022 0034)  Absence of Physical Injury: Implement safety measures based on patient assessment     Problem: Skin/Tissue Integrity - Adult  Goal: Skin integrity remains intact  Outcome: Progressing  Flowsheets (Taken 9/13/2022 0034)  Skin Integrity Remains Intact: Monitor for areas of redness and/or skin breakdown  Goal: Incisions, wounds, or drain sites healing without S/S of infection  Outcome: Progressing  Goal: Oral mucous membranes remain intact  Outcome: Progressing     Problem: Musculoskeletal - Adult  Goal: Return mobility to safest level of function  Outcome: Progressing  Goal: Maintain proper alignment of affected body part  Outcome: Progressing     Problem: Chronic Conditions and Co-morbidities  Goal: Patient's chronic conditions and co-morbidity symptoms are monitored and maintained or improved  Outcome: Progressing     Problem: Nutrition Deficit:  Goal: Optimize nutritional status  Outcome: Progressing

## 2022-09-13 NOTE — PROGRESS NOTES
VM left on spouse's phone regarding discharge time update. Son Megha Kimball notified of time to discharge to [facility].

## 2022-09-13 NOTE — PROGRESS NOTES
Pt accepted for transfer to [facility] SNF this date. [facility] to pick pt up at 1 pm. Nursing notified family of time. HENS completed and faxed to facility. No further needs identified.  Brandi Mckay MSW LSW 9/13/2022

## 2022-09-13 NOTE — PLAN OF CARE
Problem: Discharge Planning  Goal: Discharge to home or other facility with appropriate resources  9/13/2022 0036 by Elen Arce  Outcome: Progressing     Problem: Pain  Goal: Verbalizes/displays adequate comfort level or baseline comfort level  9/13/2022 0036 by Elen Arce  Outcome: Progressing     Problem: Skin/Tissue Integrity  Goal: Absence of new skin breakdown  Description: 1. Monitor for areas of redness and/or skin breakdown  2. Assess vascular access sites hourly  3. Every 4-6 hours minimum:  Change oxygen saturation probe site  4. Every 4-6 hours:  If on nasal continuous positive airway pressure, respiratory therapy assess nares and determine need for appliance change or resting period.   9/13/2022 3055 by John Sanchez RN  Outcome: Progressing  9/13/2022 0036 by Elen Arce  Outcome: Progressing     Problem: Safety - Adult  Goal: Free from fall injury  9/13/2022 0938 by John Sanchez RN  Outcome: Progressing  9/13/2022 0036 by Elen Arce  Outcome: Progressing  Flowsheets (Taken 9/13/2022 0034)  Free From Fall Injury: Instruct family/caregiver on patient safety     Problem: ABCDS Injury Assessment  Goal: Absence of physical injury  9/13/2022 0938 by John Sanchez RN  Outcome: Progressing  9/13/2022 0036 by Elen Arce  Outcome: Progressing  Flowsheets (Taken 9/13/2022 0034)  Absence of Physical Injury: Implement safety measures based on patient assessment     Problem: Skin/Tissue Integrity - Adult  Goal: Skin integrity remains intact  9/13/2022 0036 by Elen Arce  Outcome: Progressing  Flowsheets (Taken 9/13/2022 0034)  Skin Integrity Remains Intact: Monitor for areas of redness and/or skin breakdown  Goal: Incisions, wounds, or drain sites healing without S/S of infection  9/13/2022 0036 by JOSE C Randle  Outcome: Progressing  Goal: Oral mucous membranes remain intact  9/13/2022 0036 by Elen Arce  Outcome: Progressing     Problem: Musculoskeletal - Adult  Goal: Return mobility to safest level of function  9/13/2022 0036 by Seun Hernandes  Outcome: Progressing  Goal: Maintain proper alignment of affected body part  9/13/2022 0036 by Seun Hernandes  Outcome: Progressing     Problem: Chronic Conditions and Co-morbidities  Goal: Patient's chronic conditions and co-morbidity symptoms are monitored and maintained or improved  9/13/2022 0036 by Seun Hernandes  Outcome: Progressing     Problem: Nutrition Deficit:  Goal: Optimize nutritional status  9/13/2022 0036 by Seun Hernandes  Outcome: Progressing

## 2022-09-13 NOTE — PROGRESS NOTES
[facility]  Occupational Therapy  Daily Note  Date: 2022  Patient Name: Abdi Greer        MRN: [id number]    : 1935  (82 y.o.)    Subjective: Pt seated in recliner upon arrival, agreeable to OT interventions. Pt is PROGRESSING toward goals and independence of Self Care this treatment session  Continue to assess Pending Progress    Objective  ADL  Grooming: Setup, Modified independent   UE Bathing: Setup, Modified independent   LE Bathing: Supervision  UE Dressing: Supervision  LE Dressing: Supervision  Toileting: None    Sit to stand: Stand by assistance  Stand to sit: Stand by assistance    Assessment  Assessment: Patient seated in recliner upon arrival, agreeable to OT interventions. Completes sponge bath at sink, see above information regarding assistance levels. Demonstrates good standing balance at sink to complete LB bathing and dressing, no LOB noted. Remains in recliner at end of session w/ call light and other personal items within reach. Chair alarm in place. [facility]    Prognosis: Good  Discharge Recommendations: Home with Home health OT  Activity Tolerance: Patient Tolerated treatment well     Exercises:  See Flowsheets    Goals  Short Term Goals  Time Frame for Short term goals: 3 days (22)  Short Term Goal 1: Patient will complete upper body dressing/bathing tasks while using adaptive equipment/techniques PRN with MI. -MET UB BATHING  Short Term Goal 2: Patient will complete lower body dressing and/or bathing tasks while using adaptive equipment/techniques PRN with SBA. -MET  Short Term Goal 3: Patient will complete a commode transfer while using DME PRN with S/U. -NOT MET  Short Term Goal 4: Patient will complete toileting tasks while using adaptive equipment/techniques PRN with SUP. -NOT MET  Short Term Goal 5: Patient will improve dynamic standing balance to F+ to increase independence with personal hygiene/grooming tasks.  -MET    Long Term Goals  Time Frame for Long term goals : STG=LTG    Time In: 1056  Time Out: 1138  Timed Coded Minutes: 42  Total Treatment Time: [place], PERALTA/L    Date: 9/13/2022

## 2022-09-14 ENCOUNTER — CARE COORDINATION (OUTPATIENT)
Dept: CASE MANAGEMENT | Age: 87
End: 2022-09-14

## 2022-09-14 NOTE — CARE COORDINATION
[facility] Transitions Initial Follow Up Call- Post-Acute follow up       Call within 2 business days of discharge: Yes    Patient: Jocelin Lowery Patient : 1935   MRN: <[id number]>  Reason for Admission: chronic pain syndrome, left sciatica, generalized osteoarthritis, chronic low back pain, anemia (hgb 7.6), s/p ERCP   Discharge Date: 22 RARS: Readmission Risk Score: 16.8      Last Discharge [facility]       Date Complaint Diagnosis Description Type Department Provider    22 Muscle Pain Chronic pain syndrome . .. ED to Hosp-Admission (Discharged) (ADMITTED) [address] MS Bronson Comer MD; Lisa Mims MD             Spoke with: Mark Quick with [facility] Acute Facility Update    Care Transitions Post Acute Facility Update    Care Transitions Interventions  Post Acute Facility: [facility] Acute Facility Update  Reported Nursing Issues: New admission . 148/84, 83, 18, 98.3, 97% on room air. Bruising to LFA. Norco for pain prn   ADLs: Stand by Assist - Presence and Cueing   Bed Mobility: Stand by Assist - Presence and Cueing   Transfer Assistance: Stand by Assist - Presence and Cueing   Ambulation Assistance: Stand by Assist - Presence and Cueing   Does patient use an assistive device?: Yes   Assistive Devices: FWW    Barriers to Discharge: Pain   Anticipated discharge services: Likely home               Next IDT Planned Review: 2022    Future Appointments   Date Time Provider Luz Marina Byrd   2023  3:00 PM [facility] Notes:   Diet: - Oral Diet:  General  Wounds: left and arm bruising   Medications:  Other: facilities  Other:    Post-acute CC Notes: no follow up visits scheduled- will follow with facility providers    Janie Leigh RN            Follow Up  Future Appointments   Date Time Provider Luz Marina Byrd   2023  3:00 PM Bronson Comer MD [facility] Karey Barksdale RN

## 2022-09-14 NOTE — CARE COORDINATION
[facility] Transitions Initial Follow Up Call- Post-Acute follow up       Call within 2 business days of discharge: Yes    Patient: Johan Leon Patient : 1935   MRN: <[id number]>  Reason for Admission: chronic pain syndrome, left sciatica, generalized osteoarthritis, chronic low back pain, anemia (hgb 7.6), s/p ERCP   Discharge Date: 22 RARS: Readmission Risk Score: 16.8      Last Discharge [facility]       Date Complaint Diagnosis Description Type Department Provider    22 Muscle Pain Chronic pain syndrome . .. ED to Hosp-Admission (Discharged) (Alyse Guzman) [facility] 2E MS Elsa Zurita MD; Olya Vidal MD             Secure email sent to facility MDS nurse and director of therapies requesting call for admission update.  Will continue to follow       Follow Up  Future Appointments   Date Time Provider Luz Marina Byrd   2023  3:00 PM Elsa Zurita MD [facility] Amaya Ribera RN

## 2022-09-19 ENCOUNTER — CARE COORDINATION (OUTPATIENT)
Dept: CASE MANAGEMENT | Age: 87
End: 2022-09-19

## 2022-09-19 NOTE — CARE COORDINATION
[facility] Update Call    2022    Patient: Charu Hidalgo Patient : 1935   MRN: <[id number]>  Reason for Admission: chronic pain syndrome, left sciatica, generalized osteoarthritis, chronic low back pain, anemia (hgb 7.6), s/p ERCP   Discharge Date: 22 RARS: Readmission Risk Score: 16.8    Writer received message that pt has chosen to return home today. Writer will follow up with him at home tomorrow. Care Transitions Post Acute Facility Update    Care Transitions Interventions  Post Acute Facility: [facility] Acute Facility Update  Reported Nursing Issues: Writer received message that pt has chosen to return home today with outpatient therapy.  Valeria Carvalho will follow up with him at home tomorrow    Anticipated date for discharge: 22

## 2022-09-20 ENCOUNTER — CARE COORDINATION (OUTPATIENT)
Dept: CASE MANAGEMENT | Age: 87
End: 2022-09-20

## 2022-09-20 NOTE — CARE COORDINATION
[address] [place] [address] Update Call    2022    Patient: Steven Lindsey Patient : 1935   MRN: [id number]  Reason for Admission: chronic pain syndrome, left sciatica, generalized osteoarthritis, chronic low back pain, anemia (hgb 7.6), s/p ERCP   Discharge Date: 22 RARS: Readmission Risk Score: 16.8       Patient discharged home     Needs to be reviewed by the provider   PT-outpatient PT at the hospital     Method of communication with provider : chart routing           Call to pt who states he is doing alright. States pain 5/10 but it is worse overnight- he does take a norco and a gabapentin at bedtime. States pain is lower back down his left leg  He states ice does not help and he has not tried heat yet  He queries if he could go to outpatient therapy- agreeable to [facility] Wero Jacobson about this   He confirms ortho surgeon appt 10/4 and PCP 10/7  He confirms covid vaccine x 2 and 1 booster shot  He denies fever/ chills  He has good appetite but has not had a BM in 2 days- he states he was going to take miralax tonight. Writer reviewed med list reminding him of colace on the list he can take 3 times a day as needed along with increasing fluid intake and foods with fiber  He is walking with a walker without problems  He is agreeable to [facility] follow up for continued care (staff message sent)  Denies needs  Writer informs this is final ([facility]) phone call- v/u.  Encouraged call writer/ [facility] or providers if questions or concerns- v/u. Episode closed        Care Transitions Post Acute Facility Update    Care Transitions Interventions  Post Acute Facility: [address] Update  Reported Nursing Issues: Patient discharged home    Anticipated date for discharge: 22

## 2022-09-23 ENCOUNTER — CARE COORDINATION (OUTPATIENT)
Dept: CARE COORDINATION | Age: 87
End: 2022-09-23

## 2022-09-23 SDOH — HEALTH STABILITY: PHYSICAL HEALTH: ON AVERAGE, HOW MANY DAYS PER WEEK DO YOU ENGAGE IN MODERATE TO STRENUOUS EXERCISE (LIKE A BRISK WALK)?: 4 DAYS

## 2022-09-23 SDOH — ECONOMIC STABILITY: HOUSING INSECURITY: IN THE LAST 12 MONTHS, HOW MANY PLACES HAVE YOU LIVED?: 1

## 2022-09-23 SDOH — ECONOMIC STABILITY: HOUSING INSECURITY
IN THE LAST 12 MONTHS, WAS THERE A TIME WHEN YOU DID NOT HAVE A STEADY PLACE TO SLEEP OR SLEPT IN A SHELTER (INCLUDING NOW)?: NO

## 2022-09-23 SDOH — ECONOMIC STABILITY: FOOD INSECURITY: WITHIN THE PAST 12 MONTHS, YOU WORRIED THAT YOUR FOOD WOULD RUN OUT BEFORE YOU GOT MONEY TO BUY MORE.: NEVER TRUE

## 2022-09-23 SDOH — ECONOMIC STABILITY: INCOME INSECURITY: IN THE LAST 12 MONTHS, WAS THERE A TIME WHEN YOU WERE NOT ABLE TO PAY THE MORTGAGE OR RENT ON TIME?: NO

## 2022-09-23 SDOH — HEALTH STABILITY: PHYSICAL HEALTH: ON AVERAGE, HOW MANY MINUTES DO YOU ENGAGE IN EXERCISE AT THIS LEVEL?: 10 MIN

## 2022-09-23 SDOH — ECONOMIC STABILITY: FOOD INSECURITY: WITHIN THE PAST 12 MONTHS, THE FOOD YOU BOUGHT JUST DIDN'T LAST AND YOU DIDN'T HAVE MONEY TO GET MORE.: NEVER TRUE

## 2022-09-23 ASSESSMENT — SOCIAL DETERMINANTS OF HEALTH (SDOH)
HOW HARD IS IT FOR YOU TO PAY FOR THE VERY BASICS LIKE FOOD, HOUSING, MEDICAL CARE, AND HEATING?: NOT HARD AT ALL
IN A TYPICAL WEEK, HOW MANY TIMES DO YOU TALK ON THE PHONE WITH FAMILY, FRIENDS, OR NEIGHBORS?: TWICE A WEEK
DO YOU BELONG TO ANY CLUBS OR ORGANIZATIONS SUCH AS CHURCH GROUPS UNIONS, FRATERNAL OR ATHLETIC GROUPS, OR SCHOOL GROUPS?: NO
HOW OFTEN DO YOU ATTEND CHURCH OR RELIGIOUS SERVICES?: 1 TO 4 TIMES PER YEAR
HOW OFTEN DO YOU ATTENT MEETINGS OF THE CLUB OR ORGANIZATION YOU BELONG TO?: NEVER
HOW OFTEN DO YOU GET TOGETHER WITH FRIENDS OR RELATIVES?: TWICE A WEEK

## 2022-09-23 ASSESSMENT — LIFESTYLE VARIABLES: HOW OFTEN DO YOU HAVE A DRINK CONTAINING ALCOHOL: NEVER

## 2022-09-23 NOTE — CARE COORDINATION
Ambulatory Care Coordination Note  9/23/2022    ACC: Myesha Queen RN    Summary Note: Referred by care transition nurse. Had admission for chronic pain ,went to rehab for 6 days at [facility] at [facility]. He accepted care management enrollment. He declined home health, wants to go to outpatient therapy at [facility] rehab facility. He was using a walker a few days ago but now using a cane. No falls but has felt unsteady at times. No dizziness. Had back pain radiating down left leg. Leg pain is almost completely gone right now. Not taking any pain medication. Sees ortho 10/4. History CABG, atrial fib, follows with Dr. Madison Lazo, last saw 5/26, no upcoming appts. No chest pain. Occasional foot swelling. Recent cholangitis, had sphincterectomy and balloon dilatation of duct, removal several stones. Feels better. No follow up scheduled with GI at [facility]. History ulcer and partial gastrectomy. Recent anemia with blood transfusion needed. No urinary difficulties. Was constipated, took a suppository a few days ago which helped. He is on iron, wants to stop but encouraged him to wait and discuss with PCP. SDOH- no financial concerns. He does not drive, wife drives him to appts. He manages his own medications, reviewed with him. Denied any needs for any DME. CC Plan:   -Follow up next week  to check symptoms.   General Assessment    Do you have any symptoms that are causing concern?: Yes  Progression since Onset: Gradually Improving  Reported Symptoms: Pain (Comment: left leg pain)           Lab Results       None            Care Coordination Interventions    Referral from Primary Care Provider: No  Suggested Interventions and [address] [place] [address] Darius: Declined  Meals on Wheels: Declined  Medi Set or Pill Pack: Declined          Goals Addressed                   This Visit's Progress     Reduce Falls         I will reduce my risk of falls by the following: Remove rugs or use non slip rugs  Use walking aids like cane or walker    Barriers: lack of education  Plan for overcoming my barriers: ACM calls, education  Confidence: 8/10  Anticipated Goal Completion Date: 12/10/22              Prior to Admission medications    Medication Sig Start Date End Date Taking? Authorizing Provider   gabapentin (NEURONTIN) 300 MG capsule Take 1 capsule by mouth 3 times daily for 30 days. 9/13/22 10/13/22 Yes Kamran Hinton MD   ferrous sulfate (IRON 325) 325 (65 Fe) MG tablet Take 1 tablet by mouth 2 times daily (with meals) 9/13/22  Yes Kamran Hinton MD   Multiple Vitamin (MULTIVITAMIN) TABS tablet Take 1 tablet by mouth daily 9/14/22  Yes Kamran Hinton MD   pantoprazole (PROTONIX) 40 MG tablet Take 1 tablet by mouth every morning (before breakfast) 9/14/22  Yes Kamran Hinton MD   metoprolol tartrate (LOPRESSOR) 50 MG tablet Take 50 mg by mouth daily   Yes Historical Provider, MD   sertraline (ZOLOFT) 25 MG tablet take 1 tablet by mouth once daily 2/22/22  Yes Historical Provider, MD   venlafaxine (EFFEXOR) 100 MG tablet Take 1 tablet by mouth 2 times daily 1/6/22  Yes Kamran Hinton MD   atorvastatin (LIPITOR) 80 MG tablet Take 80 mg by mouth daily   Yes Historical Provider, MD   docusate sodium (COLACE) 100 MG capsule Take 1 capsule by mouth 3 times daily as needed for Constipation 4/10/18  Yes REMY Roche - CNP   vitamin D (CHOLECALCIFEROL) 1000 UNIT TABS tablet Take 3,000 Units by mouth daily    Yes Historical Provider, MD   HYDROcodone-acetaminophen (NORCO) 5-325 MG per tablet Take 1 tablet by mouth every 6 hours as needed for Pain for up to 14 days.   Patient not taking: Reported on 9/23/2022 9/13/22 9/27/22  Kamran Hinton MD   ibuprofen (IBU) 400 MG tablet Take 1 tablet by mouth every 6 hours as needed for Pain 10/25/21 9/13/22  Percy Jacinto MD   nitroGLYCERIN (NITROSTAT) 0.4 MG SL tablet Place 1 tablet under the tongue every 5 minutes as needed (chest pain) 9/24/21   Edmond Ford MD ondansetron (ZOFRAN) 4 MG tablet Take 1 tablet by mouth every 6 hours as needed for Nausea  Patient not taking: Reported on 9/23/2022 4/10/18   REYM Rizo - CNP   omeprazole (PRILOSEC) 20 MG capsule Take 40 mg by mouth Daily   9/13/22  Historical Provider, MD       Future Appointments   Date Time Provider Luz Marina Byrd   10/7/2022 10:45 AM MD Anai Ramírez  [facility]   1/6/2023  3:00 PM MD Anai Ramírez [facility]

## 2022-09-26 ENCOUNTER — CARE COORDINATION (OUTPATIENT)
Dept: CARE COORDINATION | Age: 87
End: 2022-09-26

## 2022-09-29 ENCOUNTER — CARE COORDINATION (OUTPATIENT)
Dept: CARE COORDINATION | Age: 87
End: 2022-09-29

## 2022-09-29 NOTE — CARE COORDINATION
Ambulatory Care Coordination Note  9/29/2022    ACC: Desmond Patient, RN    He feels is doing good. No falls, using walker. Back and left side pain the same. He will ask PCP next week about orders for outpatient [facility]. Has appt with ortho next week in [place]. He feels nobody wants to help him with the pain, that he's too old for any treatments. He also knows would be high risk for surgery due to heart history, CVA history. House up for sale, they will move to [facility] to be closer to son so probably last time he will see PCP, will need to find a new one there. CC Plan:   -Follow in a few weeks to review PCP notes, any changes in meds. General Assessment    Do you have any symptoms that are causing concern?: Yes  Progression since Onset: Unchanged  Reported Symptoms: Pain (Comment: back and left leg pain)                Lab Results       None            Care Coordination Interventions    Referral from Primary Care Provider: No  Suggested Interventions and [address] [place] [address] Hwy: Declined  Meals on Wheels: Declined  Medi Set or Pill Pack: Declined          Goals Addressed                   This Visit's Progress     Reduce Falls    On track     I will reduce my risk of falls by the following: Remove rugs or use non slip rugs  Use walking aids like cane or walker    Barriers: lack of education  Plan for overcoming my barriers: ACM calls, education  Confidence: 8/10  Anticipated Goal Completion Date: 12/10/22              Prior to Admission medications    Medication Sig Start Date End Date Taking? Authorizing Provider   gabapentin (NEURONTIN) 300 MG capsule Take 1 capsule by mouth 3 times daily for 30 days.  9/13/22 10/13/22  Damian Dyer MD   ferrous sulfate (IRON 325) 325 (65 Fe) MG tablet Take 1 tablet by mouth 2 times daily (with meals) 9/13/22   Damian Dyer MD   Multiple Vitamin (MULTIVITAMIN) TABS tablet Take 1 tablet by mouth daily 9/14/22   Ruslan Dickerson Jun Peacock MD   pantoprazole (PROTONIX) 40 MG tablet Take 1 tablet by mouth every morning (before breakfast) 9/14/22   Noah Hester MD   metoprolol tartrate (LOPRESSOR) 50 MG tablet Take 50 mg by mouth daily    Historical Provider, MD   sertraline (ZOLOFT) 25 MG tablet take 1 tablet by mouth once daily 2/22/22   Historical Provider, MD   venlafaxine [facility]) 100 MG tablet Take 1 tablet by mouth 2 times daily 1/6/22   Noah Hester MD   ibuprofen (IBU) 400 MG tablet Take 1 tablet by mouth every 6 hours as needed for Pain 10/25/21 9/13/22  Matt Ojeda MD   nitroGLYCERIN (NITROSTAT) 0.4 MG SL tablet Place 1 tablet under the tongue every 5 minutes as needed (chest pain) 9/24/21   Bridget Goodwin MD   atorvastatin (LIPITOR) 80 MG tablet Take 80 mg by mouth daily    Historical Provider, MD   ondansetron (ZOFRAN) 4 MG tablet Take 1 tablet by mouth every 6 hours as needed for Nausea  Patient not taking: Reported on 9/23/2022 4/10/18   REMY Penn CNP   docusate sodium (COLACE) 100 MG capsule Take 1 capsule by mouth 3 times daily as needed for Constipation 4/10/18   REMY Penn CNP   omeprazole (PRILOSEC) 20 MG capsule Take 40 mg by mouth Daily   9/13/22  Historical Provider, MD   vitamin D (CHOLECALCIFEROL) 1000 UNIT TABS tablet Take 3,000 Units by mouth daily     Historical Provider, MD       Future Appointments   Date Time Provider Luz Marina Byrd   10/7/2022 10:45 AM MD Anai Sultana [facility]   1/6/2023  3:00 PM MD Anai Sultana

## 2022-10-03 DIAGNOSIS — R53.1 GENERAL WEAKNESS: Primary | ICD-10-CM

## 2022-10-07 ENCOUNTER — TELEPHONE (OUTPATIENT)
Dept: FAMILY MEDICINE CLINIC | Age: 87
End: 2022-10-07

## 2022-10-07 ENCOUNTER — HOSPITAL ENCOUNTER (OUTPATIENT)
Age: 87
Discharge: HOME | End: 2022-10-07
Payer: MEDICARE

## 2022-10-07 ENCOUNTER — OFFICE VISIT (OUTPATIENT)
Dept: FAMILY MEDICINE CLINIC | Age: 87
End: 2022-10-07
Payer: MEDICARE

## 2022-10-07 VITALS
HEIGHT: 70 IN | WEIGHT: 164 LBS | BODY MASS INDEX: 23.48 KG/M2 | OXYGEN SATURATION: 97 % | RESPIRATION RATE: 18 BRPM | SYSTOLIC BLOOD PRESSURE: 128 MMHG | HEART RATE: 78 BPM | DIASTOLIC BLOOD PRESSURE: 72 MMHG

## 2022-10-07 DIAGNOSIS — Z23 NEED FOR INFLUENZA VACCINATION: ICD-10-CM

## 2022-10-07 DIAGNOSIS — D64.9 ACUTE ANEMIA: ICD-10-CM

## 2022-10-07 DIAGNOSIS — M54.42 CHRONIC LEFT-SIDED LOW BACK PAIN WITH LEFT-SIDED SCIATICA: Primary | ICD-10-CM

## 2022-10-07 DIAGNOSIS — I10 PRIMARY HYPERTENSION: ICD-10-CM

## 2022-10-07 DIAGNOSIS — F32.A DEPRESSION, UNSPECIFIED DEPRESSION TYPE: ICD-10-CM

## 2022-10-07 DIAGNOSIS — I48.91 ATRIAL FIBRILLATION, UNSPECIFIED TYPE (HCC): ICD-10-CM

## 2022-10-07 DIAGNOSIS — G89.29 CHRONIC LEFT-SIDED LOW BACK PAIN WITH LEFT-SIDED SCIATICA: Primary | ICD-10-CM

## 2022-10-07 LAB
ABSOLUTE EOS #: 0.1 K/UL (ref 0–0.4)
ABSOLUTE LYMPH #: 1.1 K/UL (ref 1–4.8)
ABSOLUTE MONO #: 0.9 K/UL (ref 0–1)
BASOPHILS # BLD: 0 % (ref 0–2)
BASOPHILS ABSOLUTE: 0 K/UL (ref 0–0.2)
DIFFERENTIAL TYPE: YES
EOSINOPHILS RELATIVE PERCENT: 1 % (ref 0–5)
HCT VFR BLD CALC: 34.1 % (ref 41–53)
HEMOGLOBIN: 11.2 G/DL (ref 13.5–17.5)
LYMPHOCYTES # BLD: 18 % (ref 13–44)
MCH RBC QN AUTO: 27.4 PG (ref 26–34)
MCHC RBC AUTO-ENTMCNC: 32.8 G/DL (ref 31–37)
MCV RBC AUTO: 83.6 FL (ref 80–100)
MONOCYTES # BLD: 15 % (ref 5–9)
PDW BLD-RTO: 16.4 % (ref 12.1–15.2)
PLATELET # BLD: 387 K/UL (ref 140–450)
RBC # BLD: 4.08 M/UL (ref 4.5–5.9)
SEG NEUTROPHILS: 66 % (ref 39–75)
SEGMENTED NEUTROPHILS ABSOLUTE COUNT: 4.1 K/UL (ref 2.1–6.5)
WBC # BLD: 6.2 K/UL (ref 3.5–11)

## 2022-10-07 PROCEDURE — G8427 DOCREV CUR MEDS BY ELIG CLIN: HCPCS | Performed by: INTERNAL MEDICINE

## 2022-10-07 PROCEDURE — 90694 VACC AIIV4 NO PRSRV 0.5ML IM: CPT | Performed by: INTERNAL MEDICINE

## 2022-10-07 PROCEDURE — 36415 COLL VENOUS BLD VENIPUNCTURE: CPT

## 2022-10-07 PROCEDURE — 1111F DSCHRG MED/CURRENT MED MERGE: CPT | Performed by: INTERNAL MEDICINE

## 2022-10-07 PROCEDURE — G8484 FLU IMMUNIZE NO ADMIN: HCPCS | Performed by: INTERNAL MEDICINE

## 2022-10-07 PROCEDURE — 1036F TOBACCO NON-USER: CPT | Performed by: INTERNAL MEDICINE

## 2022-10-07 PROCEDURE — G8420 CALC BMI NORM PARAMETERS: HCPCS | Performed by: INTERNAL MEDICINE

## 2022-10-07 PROCEDURE — G0008 ADMIN INFLUENZA VIRUS VAC: HCPCS | Performed by: INTERNAL MEDICINE

## 2022-10-07 PROCEDURE — 1123F ACP DISCUSS/DSCN MKR DOCD: CPT | Performed by: INTERNAL MEDICINE

## 2022-10-07 PROCEDURE — 99214 OFFICE O/P EST MOD 30 MIN: CPT | Performed by: INTERNAL MEDICINE

## 2022-10-07 PROCEDURE — 85025 COMPLETE CBC W/AUTO DIFF WBC: CPT

## 2022-10-07 RX ORDER — FERROUS SULFATE 325(65) MG
325 TABLET ORAL 2 TIMES DAILY
Qty: 60 TABLET | Refills: 2 | Status: SHIPPED | OUTPATIENT
Start: 2022-10-07

## 2022-10-07 ASSESSMENT — ENCOUNTER SYMPTOMS
ABDOMINAL PAIN: 0
WHEEZING: 0
COUGH: 0
CONSTIPATION: 0
SHORTNESS OF BREATH: 0
SORE THROAT: 0
BACK PAIN: 1
ALLERGIC/IMMUNOLOGIC NEGATIVE: 1
NAUSEA: 0
CHEST TIGHTNESS: 0
BLOOD IN STOOL: 0

## 2022-10-07 NOTE — PATIENT INSTRUCTIONS
SURVEY:    You may be receiving a survey from [email or web address] regarding your visit today. Please complete the survey to enable us to provide the highest quality of care to you and your family. If you cannot score us a very good on any question, please call the office to discuss how we could have made your experience a very good one. Thank you.   MD Leila Cotton, MD Shahbaz Yost, MD Larisa Whitman, DO Lisa Gutierrez, PM  Jeanette Panchal, EDWINA Diehl, [address] [facility]

## 2022-10-07 NOTE — TELEPHONE ENCOUNTER
----- Message from Margaret Palmer MD sent at 10/7/2022  2:18 PM EDT -----  Please let the patient know his hemoglobin looks a lot improved  Thank you  ----- Message -----  From: Dona Gonzales Incoming Lab Results From [email or web address]  Sent: 10/7/2022  11:54 AM EDT  To: Margaret Palmer MD

## 2022-10-07 NOTE — PROGRESS NOTES
HPI Notes    Name: Yvette Aragon  : 1935         Chief Complaint:     Chief Complaint   Patient presents with    Check-Up     Patient states he is better, walking better with a arias, strength is improving       History of Present Illness:        Mr. Mónica Santos presents to office to follow up for chronic low back pain, Anemia, Depression. He was hospitalized at [facility] - 9/3/22 for acute cholangitis, E.coli bacteremia, septic shock. Underwent ERCP with sphincterectomy and balloon extraction. Developed postop bleed. Was on Eliquis for h/o Afib. Was discharged home on Eliquis and presented to [facility] ER on  for evaluation of weakness, altered mental status , hypotension. His Hb was 6.8 and he was transfused 1 unit PRBC. He decided to go home and declined transfer back to [facility] for reevaluation. Came back to [facility] ER on  c/o pain in the left leg and inability to ambulate. Apparently stopped taking his Gabapentin after discharge from hospital.   Shakeel Finn was admitted for pain control, PT/OT. His Hb was still low at 7.6. and he received one more unit PRBC on . We stopped his Eliquis due to concerns with ongoing GI bleed. He was discharged to [facility] to continue rehab. States his strenght and balance significantly improved and he was able to return home  States doing great. His activities are back to baseline. He resumed Eliquis after [facility] discharge. He reports no blood in stool, no black stools, no weakness. Shakeel Finn presents as a follow up on his depression. Current medication for depression includes Effexor and Zoloft. Shakeel Finn has been on this medication for many years. The medication is  being tolerated well. Since starting the antidepressant the symptoms of depression have significantly improved. Shakeel Finn  is not in counciling. Shakeel Finn denies suicidal ideation. Back Pain  This is a chronic problem. The current episode started more than 1 year ago.  The problem occurs every several days. The problem is unchanged. The pain is present in the lumbar spine and sacro-iliac. The quality of the pain is described as burning, aching and cramping. The pain radiates to the left thigh. The pain is moderate. The symptoms are aggravated by position and standing. Pertinent negatives include no abdominal pain, chest pain, dysuria, headaches or weakness. Treatments tried: Gabapentin, prn Norco. The treatment provided moderate relief. Past Medical History:     Past Medical History:   Diagnosis Date    Anginal pain ([address]     CAD (coronary artery disease)     CVA (cerebral vascular accident) ([address] [address] 05/11/2020    CVA (cerebral vascular accident) ([address] 05/11/2020    Diverticula of colon     Hyperlipidemia     Hypertension     Kidney stone     Macular degeneration       Reviewed all health maintenance requirements and orderedappropriate tests  Health Maintenance Due   Topic Date Due    DTaP/Tdap/Td vaccine (1 - Tdap) Never done    Shingles vaccine (1 of 2) Never done       Past Surgical History:     Past Surgical History:   Procedure Laterality Date    ABDOMEN SURGERY      partial gastrectomy    ANGIOPLASTY  06/29/2016    Dr. Junaid Erazo @ [address]  10/02/2018    Dr. Dustin Phillip @ [facility]-- Stent x 1    APPENDECTOMY      CARDIAC CATHETERIZATION Left 12/24/2014    Dr. Bertha Garay -2 vessel CAD-    CARDIAC CATHETERIZATION  05/04/2007    Dr Dustin Phillip: 1. Normal left ventricular systolic function with an estimated ejection fraction of 70%. 2. Elevated left ventricular end-diastolic pressure following angiographic dye load. 3. Double-vessel coronary artery disease with angiographic edivence of restenosis within the intervened-upon segment in the right coronary artery and moderately severe disease just proximal     CARDIAC CATHETERIZATION  5/4/07 con't    to the stented segment in the mid left anterior descending. CARDIAC CATHETERIZATION  06/12/2008    Dr Blanco: 1. Moderate coronary artery disease, as described, with patent stents with evidence of mild at most in-stent restenosis of the right coronary artery. 2. Normal left ventricular systolic function. CARDIAC CATHETERIZATION Left 02/18/2022    Dr. Yosef Sweeney @ [facility]--Medical therapy    CARDIAC SURGERY      13 stents, aorta and CAD    CHOLECYSTECTOMY      COLECTOMY      COLONOSCOPY      COLONOSCOPY  03/20/2015    DILATATION, ESOPHAGUS      EYE SURGERY Bilateral     cataracts    HERNIA REPAIR Right     inguinal    JOINT REPLACEMENT Right 1998 and 2012     knee (3rd surgery)    JOINT REPLACEMENT Right 2012    hip    CA ESOPHAGOGASTRODUODENOSCOPY TRANSORAL DIAGNOSTIC N/A 06/14/2017    EGD ESOPHAGOGASTRODUODENOSCOPY; photos; bx's performed by Maris Conte MD at [address] [place] [address]  03/09/2011    Dr Nilo Bruce. Hyperdynamic left ventricular systolic function, estimated ejection fraction of 70%. 2. Normal left ventricular end-diastolic pressure. 3. Double-vessel coronary artery disease with angiographic disease progression involving the stented segment in the mid right coronary artery consistent with angiographic evidence of restenosis. STOMACH SURGERY      ulcer, gastric bypass due to \"bleeding ulcer\", partial colectomy due to blockage    UPPER GASTROINTESTINAL ENDOSCOPY          Medications:       Prior to Admission medications    Medication Sig Start Date End Date Taking? Authorizing Provider   ferrous sulfate (IRON 325) 325 (65 Fe) MG tablet Take 1 tablet by mouth 2 times daily 10/7/22  Yes Isabelle Gutierrez MD   gabapentin (NEURONTIN) 300 MG capsule Take 1 capsule by mouth 3 times daily for 30 days.  9/13/22 10/13/22 Yes Isabelle Gutierrez MD   Multiple Vitamin (MULTIVITAMIN) TABS tablet Take 1 tablet by mouth daily 9/14/22  Yes Isabelle Gutierrez MD   pantoprazole (PROTONIX) 40 MG tablet Take 1 tablet by mouth every morning (before breakfast) 9/14/22  Yes Isabelle Gutierrez MD   metoprolol tartrate (LOPRESSOR) 50 MG tablet Take 50 mg by mouth daily   Yes Historical Provider, MD   sertraline (ZOLOFT) 25 MG tablet take 1 tablet by mouth once daily 2/22/22  Yes Historical Provider, MD   venlafaxine (EFFEXOR) 100 MG tablet Take 1 tablet by mouth 2 times daily 1/6/22  Yes Isabelle Gutierrez MD   nitroGLYCERIN (NITROSTAT) 0.4 MG SL tablet Place 1 tablet under the tongue every 5 minutes as needed (chest pain) 9/24/21  Yes Shirley Rivas MD   atorvastatin (LIPITOR) 80 MG tablet Take 80 mg by mouth daily   Yes Historical Provider, MD   ondansetron (ZOFRAN) 4 MG tablet Take 1 tablet by mouth every 6 hours as needed for Nausea 4/10/18  Yes REMY Andrade CNP   docusate sodium (COLACE) 100 MG capsule Take 1 capsule by mouth 3 times daily as needed for Constipation 4/10/18  Yes REMY Andrade CNP   vitamin D (CHOLECALCIFEROL) 1000 UNIT TABS tablet Take 3,000 Units by mouth daily    Yes Historical Provider, MD   ibuprofen (IBU) 400 MG tablet Take 1 tablet by mouth every 6 hours as needed for Pain 10/25/21 9/13/22  Lavenia Eisenmenger, MD   omeprazole (PRILOSEC) 20 MG capsule Take 40 mg by mouth Daily   9/13/22  Historical Provider, MD        Allergies:       Pcn [penicillins], Ranolazine, and Penicillamine    Social History:     Tobacco: reports that he quit smoking about 55 years ago. His smoking use included cigarettes. He smoked an average of 10 packs per day. He has never used smokeless tobacco.  Alcohol:      reports no history of alcohol use. Drug Use:  reports no history of drug use. Family History:     Family History   Problem Relation Age of Onset    Heart Attack Father     Heart Attack Brother        Review of Systems:         Review of Systems   Constitutional:  Negative for activity change, appetite change and unexpected weight change. HENT:  Negative for congestion, ear discharge, ear pain and sore throat. Eyes:  Negative for visual disturbance.    Respiratory:  Negative for cough, chest tightness, shortness of breath and wheezing. Cardiovascular:  Negative for chest pain, palpitations and leg swelling. Gastrointestinal:  Negative for abdominal pain, blood in stool, constipation and nausea. Endocrine: Negative for cold intolerance, heat intolerance, polydipsia and polyuria. Genitourinary:  Negative for difficulty urinating and dysuria. Musculoskeletal:  Positive for arthralgias and back pain. Skin:  Negative for rash. Allergic/Immunologic: Negative. Neurological:  Negative for dizziness, weakness and headaches. Psychiatric/Behavioral:  Negative for behavioral problems, dysphoric mood, hallucinations and suicidal ideas. The patient is not nervous/anxious. Physical Exam:     Vitals:  /72 (Site: Right Upper Arm)   Pulse 78   Resp 18   Ht 5' 10\" (1.778 m)   Wt 164 lb (74.4 kg)   SpO2 97%   BMI 23.53 kg/m²       Physical Exam  Vitals reviewed. Constitutional:       General: He is not in acute distress. Appearance: Normal appearance. He is well-developed. HENT:      Head: Normocephalic and atraumatic. Neck:      Thyroid: No thyromegaly. Cardiovascular:      Rate and Rhythm: Normal rate and regular rhythm. Heart sounds: Normal heart sounds. No murmur heard. Pulmonary:      Effort: Pulmonary effort is normal.      Breath sounds: Normal breath sounds. No wheezing or rales. Abdominal:      General: Bowel sounds are normal. There is no distension. Palpations: Abdomen is soft. There is no mass. Tenderness: There is no abdominal tenderness. Musculoskeletal:         General: Normal range of motion. Right lower leg: No edema. Left lower leg: No edema. Lymphadenopathy:      Cervical: No cervical adenopathy. Skin:     General: Skin is warm and dry. Coloration: Skin is not jaundiced or pale. Findings: No rash. Neurological:      General: No focal deficit present. Mental Status: He is alert and oriented to person, place, and time.  Mental status is at baseline. Psychiatric:         Mood and Affect: Mood normal.         Behavior: Behavior normal.         Thought Content: Thought content normal.             Data:     Lab Results   Component Value Date/Time     09/11/2022 10:06 AM    K 4.5 09/11/2022 10:06 AM     09/11/2022 10:06 AM    CO2 23 09/11/2022 10:06 AM    BUN 19 09/11/2022 10:06 AM    CREATININE 0.81 09/11/2022 10:06 AM    GLUCOSE 120 09/11/2022 10:06 AM    GLUCOSE 110 11/15/2011 11:04 AM    PROT 5.9 09/11/2022 10:06 AM    LABALBU 3.2 09/11/2022 10:06 AM    BILITOT 0.5 09/11/2022 10:06 AM    ALKPHOS 91 09/11/2022 10:06 AM    AST 24 09/11/2022 10:06 AM    ALT 22 09/11/2022 10:06 AM     Lab Results   Component Value Date/Time    WBC 6.2 10/07/2022 11:38 AM    RBC 4.08 10/07/2022 11:38 AM    RBC 4.51 11/15/2011 11:04 AM    HGB 11.2 10/07/2022 11:38 AM    HCT 34.1 10/07/2022 11:38 AM    MCV 83.6 10/07/2022 11:38 AM    MCH 27.4 10/07/2022 11:38 AM    MCHC 32.8 10/07/2022 11:38 AM    RDW 16.4 10/07/2022 11:38 AM     10/07/2022 11:38 AM     11/15/2011 11:04 AM    MPV NOT REPORTED 02/08/2022 10:20 AM     Lab Results   Component Value Date/Time    TSH 2.39 08/30/2021 01:39 PM     Lab Results   Component Value Date/Time    CHOL 217 02/08/2022 10:20 AM    HDL 40 02/08/2022 10:20 AM    PSA 0.72 07/25/2017 11:14 AM    LABA1C 6.2 04/26/2022 11:07 AM          Assessment & Plan        Diagnosis Orders   1. Chronic left-sided low back pain with left-sided sciatica   Pain improved after PT/OT at [facility], continue on Gabapentin  He was evaluated by Dr. Pat Farias in [place] and supposed to have MRI lumbar spine on 11/1/22 and follow up with him. 2. Acute anemia   Will start on iron supplement. Recheck CBC since he resumed Eliquis. ferrous sulfate (IRON 325) 325 (65 Fe) MG tablet    CBC with Auto Differential      3. Depression, unspecified depression type   Stable, continue on current meds       4. Primary hypertension        5. Atrial fibrillation, unspecified type ([facility]  On Eliquis, Lopressor. Follow up with Dr. Brad Castro        6. Need for influenza vaccination  Influenza, FLUAD, (age 72 y+), IM, Preservative Free, 0.5 mL                      Completed Refills   Requested Prescriptions     Signed Prescriptions Disp Refills    ferrous sulfate (IRON 325) 325 (65 Fe) MG tablet 60 tablet 2     Sig: Take 1 tablet by mouth 2 times daily     Return in about 3 months (around 1/7/2023), or anemia, for HTN, back pain. Orders Placed This Encounter   Medications    ferrous sulfate (IRON 325) 325 (65 Fe) MG tablet     Sig: Take 1 tablet by mouth 2 times daily     Dispense:  60 tablet     Refill:  2     Orders Placed This Encounter   Procedures    Influenza, FLUAD, (age 72 y+), IM, Preservative Free, 0.5 mL    CBC with Auto Differential     Standing Status:   Future     Number of Occurrences:   1     Standing Expiration Date:   10/7/2023         Patient Instructions     SURVEY:    You may be receiving a survey from [employer] regarding your visit today. Please complete the survey to enable us to provide the highest quality of care to you and your family. If you cannot score us a very good on any question, please call the office to discuss how we could have made your experience a very good one. Thank you.   MD Maldonado Cotton MD Kandy Dura, MD Reginia Bowl, [facility], PM  EDWINA Enciso, [address] [facility], [facility] [address]  [facility]    Electronically signed by Anthony Moore MD on 10/7/2022 at 8:33 PM           Completed Refills      Requested Prescriptions     Signed Prescriptions Disp Refills    ferrous sulfate (IRON 325) 325 (65 Fe) MG tablet 60 tablet 2     Sig: Take 1 tablet by mouth 2 times daily

## 2022-10-13 ENCOUNTER — CARE COORDINATION (OUTPATIENT)
Dept: CARE COORDINATION | Age: 87
End: 2022-10-13

## 2022-10-13 NOTE — CARE COORDINATION
Ambulatory Care Coordination Note  10/13/2022    ACC: Nikita Bella RN    He will get MRI lumbar spine 11/1 and follow up with ortho Dr. Ley Part 11/7. Low back pain the same. No falls. He decided not to pursue going to outpatient PT. They will be moving to [facility] in about 3 weeks to be near son. Son is working on finding them a new PCP there. No blood in stools. Eating ok. No other concerns or needs. Offered patient enrollment in the Remote Patient Monitoring (RPM) program for in-home monitoring: Patient is not eligible for RPM program.    CC Plan:   -Follow in a few weeks to see if has moved, any new treatments or needs. General Assessment    Do you have any symptoms that are causing concern?: Yes  Progression since Onset: Unchanged  Reported Symptoms: Pain (Comment: low back pain)           Lab Results       None            Care Coordination Interventions    Referral from Primary Care Provider: No  Suggested Interventions and [address] [place] [address] Hwy: Declined  Meals on Wheels: Declined  Medi Set or Pill Pack: Declined          Goals Addressed                   This Visit's Progress     Reduce Falls    On track     I will reduce my risk of falls by the following: Remove rugs or use non slip rugs  Use walking aids like cane or walker    Barriers: lack of education  Plan for overcoming my barriers: ACM calls, education  Confidence: 8/10  Anticipated Goal Completion Date: 12/10/22              Prior to Admission medications    Medication Sig Start Date End Date Taking? Authorizing Provider   ferrous sulfate (IRON 325) 325 (65 Fe) MG tablet Take 1 tablet by mouth 2 times daily 10/7/22   Praveen Oden MD   gabapentin (NEURONTIN) 300 MG capsule Take 1 capsule by mouth 3 times daily for 30 days.  9/13/22 10/13/22  Praveen Oden MD   Multiple Vitamin (MULTIVITAMIN) TABS tablet Take 1 tablet by mouth daily 9/14/22   Praveen Oden MD   pantoprazole (PROTONIX) 40 MG tablet Take 1 tablet by mouth every morning (before breakfast) 9/14/22   Fuentes Schmitz MD   metoprolol tartrate (LOPRESSOR) 50 MG tablet Take 50 mg by mouth daily    Historical Provider, MD   sertraline (ZOLOFT) 25 MG tablet take 1 tablet by mouth once daily 2/22/22   Historical Provider, MD   venlafaxine [facility]) 100 MG tablet Take 1 tablet by mouth 2 times daily 1/6/22   Fuentes Schmitz MD   ibuprofen (IBU) 400 MG tablet Take 1 tablet by mouth every 6 hours as needed for Pain 10/25/21 9/13/22  Vijay Hernández MD   nitroGLYCERIN (NITROSTAT) 0.4 MG SL tablet Place 1 tablet under the tongue every 5 minutes as needed (chest pain) 9/24/21   Dante Renae MD   atorvastatin (LIPITOR) 80 MG tablet Take 80 mg by mouth daily    Historical Provider, MD   ondansetron (ZOFRAN) 4 MG tablet Take 1 tablet by mouth every 6 hours as needed for Nausea 4/10/18   REMY Aleman CNP   docusate sodium (COLACE) 100 MG capsule Take 1 capsule by mouth 3 times daily as needed for Constipation 4/10/18   REMY Aleman CNP   omeprazole (PRILOSEC) 20 MG capsule Take 40 mg by mouth Daily   9/13/22  Historical Provider, MD   vitamin D (CHOLECALCIFEROL) 1000 UNIT TABS tablet Take 3,000 Units by mouth daily     Historical Provider, MD       Future Appointments   Date Time Provider Luz Marina Byrd   1/6/2023  3:00 PM Fuentes Schmitz MD [facility] Zehra Mary

## 2022-10-18 ENCOUNTER — HOSPITAL ENCOUNTER (EMERGENCY)
Age: 87
Discharge: HOME | End: 2022-10-18
Attending: EMERGENCY MEDICINE
Payer: MEDICARE

## 2022-10-18 VITALS
SYSTOLIC BLOOD PRESSURE: 121 MMHG | DIASTOLIC BLOOD PRESSURE: 78 MMHG | WEIGHT: 176.6 LBS | HEART RATE: 76 BPM | OXYGEN SATURATION: 98 % | HEIGHT: 70 IN | RESPIRATION RATE: 13 BRPM | TEMPERATURE: 97.4 F | BODY MASS INDEX: 25.28 KG/M2

## 2022-10-18 DIAGNOSIS — I95.9 HYPOTENSION, UNSPECIFIED HYPOTENSION TYPE: ICD-10-CM

## 2022-10-18 DIAGNOSIS — R42 LIGHTHEADEDNESS: Primary | ICD-10-CM

## 2022-10-18 LAB
ABSOLUTE EOS #: 0.1 K/UL (ref 0–0.4)
ABSOLUTE LYMPH #: 1.5 K/UL (ref 1–4.8)
ABSOLUTE MONO #: 0.8 K/UL (ref 0–1)
ANION GAP SERPL CALCULATED.3IONS-SCNC: 8 MMOL/L (ref 9–17)
BASOPHILS # BLD: 0 % (ref 0–2)
BASOPHILS ABSOLUTE: 0 K/UL (ref 0–0.2)
BUN BLDV-MCNC: 14 MG/DL (ref 8–23)
BUN/CREAT BLD: 13 (ref 9–20)
CALCIUM SERPL-MCNC: 9 MG/DL (ref 8.6–10.4)
CHLORIDE BLD-SCNC: 104 MMOL/L (ref 98–107)
CO2: 26 MMOL/L (ref 20–31)
CREAT SERPL-MCNC: 1.12 MG/DL (ref 0.7–1.2)
DIFFERENTIAL TYPE: YES
EOSINOPHILS RELATIVE PERCENT: 2 % (ref 0–5)
GFR SERPL CREATININE-BSD FRML MDRD: >60 ML/MIN/1.73M2
GLUCOSE BLD-MCNC: 148 MG/DL (ref 70–99)
HCT VFR BLD CALC: 34.6 % (ref 41–53)
HEMOGLOBIN: 11.6 G/DL (ref 13.5–17.5)
LYMPHOCYTES # BLD: 21 % (ref 13–44)
MCH RBC QN AUTO: 27.5 PG (ref 26–34)
MCHC RBC AUTO-ENTMCNC: 33.4 G/DL (ref 31–37)
MCV RBC AUTO: 82.4 FL (ref 80–100)
MONOCYTES # BLD: 12 % (ref 5–9)
PDW BLD-RTO: 15.6 % (ref 12.1–15.2)
PLATELET # BLD: 327 K/UL (ref 140–450)
POTASSIUM SERPL-SCNC: 3.9 MMOL/L (ref 3.7–5.3)
RBC # BLD: 4.2 M/UL (ref 4.5–5.9)
SEG NEUTROPHILS: 65 % (ref 39–75)
SEGMENTED NEUTROPHILS ABSOLUTE COUNT: 4.8 K/UL (ref 2.1–6.5)
SODIUM BLD-SCNC: 138 MMOL/L (ref 135–144)
TROPONIN, HIGH SENSITIVITY: 32 NG/L (ref 0–22)
WBC # BLD: 7.3 K/UL (ref 3.5–11)

## 2022-10-18 PROCEDURE — 80048 BASIC METABOLIC PNL TOTAL CA: CPT

## 2022-10-18 PROCEDURE — 84484 ASSAY OF TROPONIN QUANT: CPT

## 2022-10-18 PROCEDURE — 99284 EMERGENCY DEPT VISIT MOD MDM: CPT

## 2022-10-18 PROCEDURE — 85025 COMPLETE CBC W/AUTO DIFF WBC: CPT

## 2022-10-18 PROCEDURE — 93005 ELECTROCARDIOGRAM TRACING: CPT | Performed by: EMERGENCY MEDICINE

## 2022-10-18 NOTE — ED PROVIDER NOTES
EMERGENCY DEPARTMENT ENCOUNTER      CHIEF COMPLAINT    Chief Complaint   Patient presents with    Hypotension     Arrives via [facility], wife states BP is low 50/46. Pt c/o lightheaded and dizzy. Unsteady gait. HPI Altamease Lennox is a 82 y.o. male who presentsto ED from home. By car. With complaint of low BP  Onset this am.  Patient states that he is felt lightheaded and dizzy. No LOC. Denies chest pain. Patient's wife checked his blood pressure at home. Patient had a blood pressure of 50/46. States that he stood up quickly and felt lightheaded and dizzy. Patient is antibiotic in ED. Patient blood pressure is stable in ED. Patient took isosorbide and metoprolol this morning. PAST MEDICAL HISTORY    Past Medical History:   Diagnosis Date    Anginal pain ([address]     CAD (coronary artery disease)     CVA (cerebral vascular accident) ([address] 05/11/2020    CVA (cerebral vascular accident) ([facility] 05/11/2020    Diverticula of colon     Hyperlipidemia     Hypertension     Kidney stone     Macular degeneration        SURGICAL HISTORY    Past Surgical History:   Procedure Laterality Date    ABDOMEN SURGERY      partial gastrectomy    ANGIOPLASTY  06/29/2016    Dr. Ashley Butts @ [address]  10/02/2018    Dr. Kennedy Camargo @ [facility]-- Stent x 1    APPENDECTOMY      CARDIAC CATHETERIZATION Left 12/24/2014    Dr. Varela Score -2 vessel CAD-    CARDIAC CATHETERIZATION  05/04/2007    Dr Kennedy Camargo: 1. Normal left ventricular systolic function with an estimated ejection fraction of 70%. 2. Elevated left ventricular end-diastolic pressure following angiographic dye load. 3. Double-vessel coronary artery disease with angiographic edivence of restenosis within the intervened-upon segment in the right coronary artery and moderately severe disease just proximal     CARDIAC CATHETERIZATION  5/4/07 con't    to the stented segment in the mid left anterior descending. CARDIAC CATHETERIZATION  06/12/2008    Dr Blanco: 1. Moderate coronary artery disease, as described, with patent stents with evidence of mild at most in-stent restenosis of the right coronary artery. 2. Normal left ventricular systolic function. CARDIAC CATHETERIZATION Left 02/18/2022    Dr. Saravanan Elizalde @ [facility]--Medical therapy    CARDIAC SURGERY      13 stents, aorta and CAD    CHOLECYSTECTOMY      COLECTOMY      COLONOSCOPY      COLONOSCOPY  03/20/2015    DILATATION, ESOPHAGUS      EYE SURGERY Bilateral     cataracts    HERNIA REPAIR Right     inguinal    JOINT REPLACEMENT Right 1998 and 2012     knee (3rd surgery)    JOINT REPLACEMENT Right 2012    hip    [place] ESOPHAGOGASTRODUODENOSCOPY TRANSORAL DIAGNOSTIC N/A 06/14/2017    EGD ESOPHAGOGASTRODUODENOSCOPY; photos; bx's performed by Rowan Bay MD at [address]  03/09/2011    Dr Pau Farley. Hyperdynamic left ventricular systolic function, estimated ejection fraction of 70%. 2. Normal left ventricular end-diastolic pressure. 3. Double-vessel coronary artery disease with angiographic disease progression involving the stented segment in the mid right coronary artery consistent with angiographic evidence of restenosis. STOMACH SURGERY      ulcer, gastric bypass due to \"bleeding ulcer\", partial colectomy due to blockage    UPPER GASTROINTESTINAL ENDOSCOPY         CURRENT MEDICATIONS    Current Outpatient Rx   Medication Sig Dispense Refill    apixaban (ELIQUIS) 5 MG TABS tablet Take 5 mg by mouth in the morning and at bedtime      ferrous sulfate (IRON 325) 325 (65 Fe) MG tablet Take 1 tablet by mouth 2 times daily 60 tablet 2    gabapentin (NEURONTIN) 300 MG capsule Take 1 capsule by mouth 3 times daily for 30 days.  90 capsule 3    Multiple Vitamin (MULTIVITAMIN) TABS tablet Take 1 tablet by mouth daily  0    pantoprazole (PROTONIX) 40 MG tablet Take 1 tablet by mouth every morning (before breakfast) 30 tablet 3    metoprolol tartrate (LOPRESSOR) 50 MG tablet Take 50 mg by mouth daily      sertraline (ZOLOFT) 25 MG tablet take 1 tablet by mouth once daily      venlafaxine (EFFEXOR) 100 MG tablet Take 1 tablet by mouth 2 times daily 60 tablet 0    nitroGLYCERIN (NITROSTAT) 0.4 MG SL tablet Place 1 tablet under the tongue every 5 minutes as needed (chest pain) 25 tablet 2    atorvastatin (LIPITOR) 80 MG tablet Take 80 mg by mouth daily      ondansetron (ZOFRAN) 4 MG tablet Take 1 tablet by mouth every 6 hours as needed for Nausea 30 tablet 0    docusate sodium (COLACE) 100 MG capsule Take 1 capsule by mouth 3 times daily as needed for Constipation 45 capsule 0    vitamin D (CHOLECALCIFEROL) 1000 UNIT TABS tablet Take 3,000 Units by mouth daily          ALLERGIES    Allergies   Allergen Reactions    Pcn [Penicillins]      Large red spots    Ranolazine Other (See Comments)     dizziness  Other reaction(s): Unknown  Other reaction(s): Other (See Comments), Unknown  dizziness  Other reaction(s): Unknown    Penicillamine Other (See Comments) and Rash     Other reaction(s):  Other (See Comments), Unknown       FAMILY HISTORY    Family History   Problem Relation Age of Onset    Heart Attack Father     Heart Attack Brother        SOCIAL HISTORY    Social History     Socioeconomic History    Marital status:      Spouse name: Hilda Mcgowan    Number of children: 3    Years of education: None    Highest education level: None   Tobacco Use    Smoking status: Former     Packs/day: 10.00     Types: Cigarettes     Quit date: 3/20/1967     Years since quittin.6    Smokeless tobacco: Never   Vaping Use    Vaping Use: Never used   Substance and Sexual Activity    Alcohol use: No    Drug use: No     Social Determinants of Health     Financial Resource Strain: Low Risk     Difficulty of Paying Living Expenses: Not hard at all   Food Insecurity: No Food Insecurity    Worried About [address] in the Last Year: Never true    Ran Out of Food in the Last Year: Never true   Transportation Needs: No Transportation Needs Lack of Transportation (Medical): No    Lack of Transportation (Non-Medical): No   Physical Activity: Insufficiently Active    Days of Exercise per Week: 4 days    Minutes of Exercise per Session: 10 min   Stress: No Stress Concern Present    Feeling of Stress : Only a little   Social Connections: Moderately Integrated    Frequency of Communication with Friends and Family: Twice a week    Frequency of Social Gatherings with Friends and Family: Twice a week    Attends Orthodoxy Services: 1 to 4 times per year    Active Member of [address] [place] [address] [employer] or Organizations: No    Attends Club or Organization Meetings: Never    Marital Status:    Housing Stability: Low Risk     Unable to Pay for Housing in the Last Year: No    Number of [place] in the Last Year: 1    Unstable Housing in the Last Year: No           Review of Systems:  Constitutional: Positive for lightheadedness dizziness. Eyes:  Denies photophobia or discharge   HENT:  Denies sore throat or ear pain   Respiratory:  Denies cough or shortness of breath   Cardiovascular:  Denies chest pain, palpitations or swelling   GI:  Denies abdominal pain, nausea, vomiting, or diarrhea   Musculoskeletal:  Denies back pain   Skin:  Denies rash   Neurologic:  Denies headache, focal weakness or sensory changes   Endocrine:  Denies polyuria or polydypsia   Lymphatic:  Denies swollen glands   Psychiatric:  Denies depression, suicidal ideation or homicidal ideation   All systems negative except as marked. PHYSICAL EXAM    VITAL SIGNS: /78   Pulse 76   Temp 97.4 °F (36.3 °C) (Oral)   Resp 13   Ht 5' 10\" (1.778 m)   Wt 176 lb 9.6 oz (80.1 kg)   SpO2 98%   BMI 25.34 kg/m²    Constitutional: Early male with lightheadedness and dizziness at Home. Asymptomatic NAD. HENT:  Normocephalic, Atraumatic, Bilateral external ears normal, Oropharynx moist, No oral exudates, Nose normal. Neck- Normal range of motion, No tenderness, Supple, No stridor.    Eyes:  PERRL, EOMI, Conjunctiva normal, No discharge. Respiratory:  Normal breath sounds, No respiratory distress, No wheezing, No chest tenderness. Cardiovascular:  Normal heart rate, Normal rhythm, No murmurs, No rubs, No gallops. GI:  Bowel sounds normal, Soft, No tenderness, No masses, No pulsatile masses. : External genitalia appear normal, No masses or lesions. No discharge. No CVA tenderness. Musculoskeletal:  Intact distal pulses, No edema, No tenderness, No cyanosis, No clubbing. Good range of motion in all major joints. No tenderness to palpation or major deformities noted. Back- No tenderness. Integument:  Warm, Dry, No erythema, No rash. Lymphatic:  No lymphadenopathy noted. Neurologic:  Alert & oriented x 3, Normal motor function, Normal sensory function, No focal deficits noted. Psychiatric:  Affect normal, Judgment normal, Mood normal.     EKG    Sinus rhythm rate of 70. RADIOLOGY    No orders to display       PROCEDURES        Labs  Labs Reviewed   CBC WITH AUTO DIFFERENTIAL - Abnormal; Notable for the following components:       Result Value    RBC 4.20 (*)     Hemoglobin 11.6 (*)     Hematocrit 34.6 (*)     RDW 15.6 (*)     Monocytes 12 (*)     All other components within normal limits   BASIC METABOLIC PANEL - Abnormal; Notable for the following components:    Glucose 148 (*)     Anion Gap 8 (*)     All other components within normal limits   TROPONIN - Abnormal; Notable for the following components:    Troponin, High Sensitivity 32 (*)     All other components within normal limits   URINALYSIS WITH MICROSCOPIC             Summation      Patient Course: Patient remained asymptomatic in ED. Patient was observed on telemetry. Labs are stable. Patient is instructed to hold Sorbide and follow-up with cardiologist.  The warning signs were discussed. Return to ED if worse.     ED Medications administered this visit:  Medications - No data to display    New Prescriptions from this visit:    New Prescriptions    No medications on file       Follow-up:  No follow-up provider specified. Final Impression:   1. Lightheadedness    2.  Hypotension, unspecified hypotension type               (Please note that portions of this note were completed with a voice recognition program.  Efforts were made to edit the dictations but occasionally words are mis-transcribed.)         Lisa Jalloh MD  10/18/22 [id number]

## 2022-10-19 ENCOUNTER — CARE COORDINATION (OUTPATIENT)
Dept: CARE COORDINATION | Age: 87
End: 2022-10-19

## 2022-10-19 LAB
EKG ATRIAL RATE: 70 BPM
EKG P AXIS: 34 DEGREES
EKG P-R INTERVAL: 180 MS
EKG Q-T INTERVAL: 428 MS
EKG QRS DURATION: 80 MS
EKG QTC CALCULATION (BAZETT): 462 MS
EKG R AXIS: -27 DEGREES
EKG T AXIS: 14 DEGREES
EKG VENTRICULAR RATE: 70 BPM

## 2022-10-19 PROCEDURE — 93010 ELECTROCARDIOGRAM REPORT: CPT | Performed by: INTERNAL MEDICINE

## 2022-10-19 NOTE — CARE COORDINATION
Ambulatory Care Coordination  ED Follow up Call    Reason for ED visit:  Hypotension  Status:     improved, per patient he was told to stop one of his BP medications awhile back and somehow it ended up back in his current medications. He has  removed it for the future. He did not have a fall during episode and stated that he is feeling ok today. AC encouraged patient to call his cardiologist to make a follow up appointment, patient stated he has one coming up but believes this was a result of that medication. Did you call your PCP prior to going to the ED? Not Applicable      Did you receive a discharge instructions from the Emergency Room? Yes  Review of Instructions:     Understands what to report/when to return?:  Yes   Understands discharge instructions?:  Yes   Following discharge instructions?:  Yes       Are there any new complaints of pain? No  New Pain Meds? N/A    Constipation prophylaxis needed? N/A    If you have a wound is the dressing clean, dry, and intact? N/A  Understands wound care regimen? N/A    Are there any other complaints/concerns that you wish to tell your provider? Not at this time    FU appts/Provider:    Future Appointments   Date Time Provider Luz Marina Byrd   1/6/2023  3:00 PM MD Anai Phan [facility]           New Medications?:   No      Medication Reconciliation by phone - Yes  Understands Medications? Yes  Taking Medications? Yes  Can you swallow your pills? Yes    Any further needs in the home i.e. Equipment?   Not Applicable    Link to services in community?:  N/A

## 2022-10-21 RX ORDER — NIRMATRELVIR AND RITONAVIR 150-100 MG
KIT ORAL
Qty: 20 TABLET | Refills: 0 | Status: SHIPPED | OUTPATIENT
Start: 2022-10-21 | End: 2022-10-26

## 2022-10-25 ENCOUNTER — CARE COORDINATION (OUTPATIENT)
Dept: CARE COORDINATION | Age: 87
End: 2022-10-25

## 2022-10-25 NOTE — CARE COORDINATION
Ambulatory Care Coordination Note  10/25/2022    ACC: José Miguel Malik RN    Positive COVID test at home last week, on Paxlovid from PCP. Feels tired. Had a cough for few days but none today. Eating and drinking ok, no n/v/, no dypnea. Has stuffy nose. Wife also has COVID. BP ok this week, 623A systolic. No dizziness. Encouraged oral fluids frequently in small amounts. Encouraged to keep active, not be sedentary, move around every half hour, deep breathing and coughing exercises explained. CC Plan:   -Follow up next week to check symptoms, BP, discuss COVID vaccine. Offered patient enrollment in the Remote Patient Monitoring (RPM) program for in-home monitoring: Patient is not eligible for RPM program.    Lab Results       None            Care Coordination Interventions    Referral from Primary Care Provider: No  Suggested Interventions and [address]: Declined  Meals on Wheels: Declined  Medi Set or Pill Pack: Declined          Goals Addressed    None         Prior to Admission medications    Medication Sig Start Date End Date Taking? Authorizing Provider   nirmatrelvir/ritonavir (PAXLOVID, 150/100,) 10 x 150 MG & 10 x 100MG TBPK Take 2 tablets (one 150 mg nirmatrelvir and one 100 mg ritonavir tablets) by mouth every 12 hours for 5 days. 10/21/22 10/26/22  Rafael Saucedo MD   apixaban (ELIQUIS) 5 MG TABS tablet Take 5 mg by mouth in the morning and at bedtime 9/6/22   Historical Provider, MD   ferrous sulfate (IRON 325) 325 (65 Fe) MG tablet Take 1 tablet by mouth 2 times daily 10/7/22   Rafael Saucedo MD   gabapentin (NEURONTIN) 300 MG capsule Take 1 capsule by mouth 3 times daily for 30 days.  9/13/22 10/18/22  Rafael Saucedo MD   Multiple Vitamin (MULTIVITAMIN) TABS tablet Take 1 tablet by mouth daily 9/14/22   Rafael Saucedo MD   pantoprazole (PROTONIX) 40 MG tablet Take 1 tablet by mouth every morning (before breakfast) 9/14/22   Rafael Saucedo MD   metoprolol tartrate (LOPRESSOR) 50 MG tablet Take 50 mg by mouth daily    Historical Provider, MD   sertraline (ZOLOFT) 25 MG tablet take 1 tablet by mouth once daily 2/22/22   Historical Provider, MD   venlafaxine (EFFEXOR) 100 MG tablet Take 1 tablet by mouth 2 times daily 1/6/22   Scotty Anderson MD   ibuprofen (IBU) 400 MG tablet Take 1 tablet by mouth every 6 hours as needed for Pain 10/25/21 9/13/22  Kobe Dawn MD   nitroGLYCERIN (NITROSTAT) 0.4 MG SL tablet Place 1 tablet under the tongue every 5 minutes as needed (chest pain) 9/24/21   Tee Abebe MD   atorvastatin (LIPITOR) 80 MG tablet Take 80 mg by mouth daily    Historical Provider, MD   ondansetron (ZOFRAN) 4 MG tablet Take 1 tablet by mouth every 6 hours as needed for Nausea 4/10/18   Rupinder Ochoa APRN - CNP   docusate sodium (COLACE) 100 MG capsule Take 1 capsule by mouth 3 times daily as needed for Constipation 4/10/18   Rupinder Ochoa APRN - CNP   omeprazole (PRILOSEC) 20 MG capsule Take 40 mg by mouth Daily   9/13/22  Historical Provider, MD   vitamin D (CHOLECALCIFEROL) 1000 UNIT TABS tablet Take 3,000 Units by mouth daily     Historical Provider, MD       Future Appointments   Date Time Provider Luz Marina Byrd   1/6/2023  3:00 PM Scotty Anderson MD [facility] AT [facility]

## 2022-11-01 ENCOUNTER — CARE COORDINATION (OUTPATIENT)
Dept: CARE COORDINATION | Age: 87
End: 2022-11-01

## 2022-11-01 NOTE — CARE COORDINATION
Ambulatory Care Coordination Note  11/1/2022    ACC: Rafa Lazaro, VALERIO    He and wife are both feeling better this week since having COVID almost 2 weeks ago. Eating and drinking ok, no n/v/d, dyspnea. Finished the Paxlovid. Less fatigued this week. They are moving to [facility] next week to be near son who will be finding them a new PCP to go to. He has all his medication, stated doesn't need anything. Graduated from care management. Offered patient enrollment in the Remote Patient Monitoring (RPM) program for in-home monitoring: Patient is not eligible for RPM program.    Lab Results       None            Care Coordination Interventions    Referral from Primary Care Provider: No  Suggested Interventions and [address] [place] [address] Hwy: Declined  Meals on Wheels: Declined  Medi Set or Pill Pack: Declined          Goals Addressed                   This Visit's Progress     Reduce Falls    On track     I will reduce my risk of falls by the following: Remove rugs or use non slip rugs  Use walking aids like cane or walker    Barriers: lack of education  Plan for overcoming my barriers: ACM calls, education  Confidence: 8/10  Anticipated Goal Completion Date: 12/10/22              Prior to Admission medications    Medication Sig Start Date End Date Taking? Authorizing Provider   apixaban (ELIQUIS) 5 MG TABS tablet Take 5 mg by mouth in the morning and at bedtime 9/6/22   Historical Provider, MD   ferrous sulfate (IRON 325) 325 (65 Fe) MG tablet Take 1 tablet by mouth 2 times daily 10/7/22   Brayan Israel MD   gabapentin (NEURONTIN) 300 MG capsule Take 1 capsule by mouth 3 times daily for 30 days.  9/13/22 10/18/22  Brayan Israel MD   Multiple Vitamin (MULTIVITAMIN) TABS tablet Take 1 tablet by mouth daily 9/14/22   Brayan Israel MD   pantoprazole (PROTONIX) 40 MG tablet Take 1 tablet by mouth every morning (before breakfast) 9/14/22   Brayan Israel MD   metoprolol tartrate (LOPRESSOR) 50 MG tablet Take 50 mg by mouth daily    Historical Provider, MD   sertraline (ZOLOFT) 25 MG tablet take 1 tablet by mouth once daily 2/22/22   Historical Provider, MD   venlafaxine (EFFEXOR) 100 MG tablet Take 1 tablet by mouth 2 times daily 1/6/22   Arina Spencer MD   ibuprofen (IBU) 400 MG tablet Take 1 tablet by mouth every 6 hours as needed for Pain 10/25/21 9/13/22  Petrona Rueda MD   nitroGLYCERIN (NITROSTAT) 0.4 MG SL tablet Place 1 tablet under the tongue every 5 minutes as needed (chest pain) 9/24/21   Palma Antoine MD   atorvastatin (LIPITOR) 80 MG tablet Take 80 mg by mouth daily    Historical Provider, MD   ondansetron (ZOFRAN) 4 MG tablet Take 1 tablet by mouth every 6 hours as needed for Nausea 4/10/18   REMY Forde CNP   docusate sodium (COLACE) 100 MG capsule Take 1 capsule by mouth 3 times daily as needed for Constipation 4/10/18   REMY Forde CNP   omeprazole (PRILOSEC) 20 MG capsule Take 40 mg by mouth Daily   9/13/22  Historical Provider, MD   vitamin D (CHOLECALCIFEROL) 1000 UNIT TABS tablet Take 3,000 Units by mouth daily     Historical Provider, MD       Future Appointments   Date Time Provider Luz Marina Gerardisti   1/6/2023  3:00 PM Arina Spencer MD [facility] AT [facility] Shameka Rush

## 2025-08-13 ENCOUNTER — TELEPHONE (OUTPATIENT)
Dept: CARDIOLOGY CLINIC | Age: 89
End: 2025-08-13

## 2025-08-13 DIAGNOSIS — R91.1 PULMONARY NODULE: Primary | ICD-10-CM

## (undated) DEVICE — MEDI-VAC NON-CONDUCTIVE SUCTION TUBING 6MM X 6.1M (20 FT.) L: Brand: CARDINAL HEALTH

## (undated) DEVICE — 1200CC SUCTION CANISTER WITH HYDROPHOBIC FILTER AND RED LID: Brand: BEMIS

## (undated) DEVICE — FORCEP SPEC RETRV BX AD 2 MMX155 CM 5 MM GI OVL CUP W/ NDL

## (undated) DEVICE — REAGENT TEST UREASE RAPD CLOTEST F/

## (undated) DEVICE — BLOCK BITE AD OPN SZ 48FR MOUTHPC ENDOSCP STURDY W/ FOAM

## (undated) DEVICE — JELLY LUBRICATING 4OZ FLIP TOP TB E Z

## (undated) DEVICE — GOWN,AURORA,NONRNF,XL,30/CS: Brand: MEDLINE